# Patient Record
Sex: FEMALE | Race: WHITE | Employment: OTHER | URBAN - METROPOLITAN AREA
[De-identification: names, ages, dates, MRNs, and addresses within clinical notes are randomized per-mention and may not be internally consistent; named-entity substitution may affect disease eponyms.]

---

## 2019-10-03 PROCEDURE — 87624 HPV HI-RISK TYP POOLED RSLT: CPT | Performed by: FAMILY MEDICINE

## 2019-10-03 PROCEDURE — 88175 CYTOPATH C/V AUTO FLUID REDO: CPT | Performed by: FAMILY MEDICINE

## 2019-12-16 PROCEDURE — 88305 TISSUE EXAM BY PATHOLOGIST: CPT | Performed by: INTERNAL MEDICINE

## 2020-01-10 ENCOUNTER — OFFICE VISIT (OUTPATIENT)
Dept: WOUND CARE | Facility: HOSPITAL | Age: 55
End: 2020-01-10
Attending: CLINICAL NURSE SPECIALIST
Payer: COMMERCIAL

## 2020-01-10 DIAGNOSIS — Z71.89 OSTOMY NURSE CONSULTATION: Primary | ICD-10-CM

## 2020-01-10 DIAGNOSIS — C20 RECTAL CANCER (HCC): ICD-10-CM

## 2020-01-10 PROCEDURE — 99213 OFFICE O/P EST LOW 20 MIN: CPT

## 2020-01-10 RX ORDER — FAMOTIDINE 20 MG/1
20 TABLET ORAL ONCE
Status: CANCELLED | OUTPATIENT
Start: 2020-01-10 | End: 2020-01-10

## 2020-01-10 RX ORDER — SODIUM CHLORIDE, SODIUM LACTATE, POTASSIUM CHLORIDE, CALCIUM CHLORIDE 600; 310; 30; 20 MG/100ML; MG/100ML; MG/100ML; MG/100ML
INJECTION, SOLUTION INTRAVENOUS CONTINUOUS
Status: CANCELLED | OUTPATIENT
Start: 2020-01-10

## 2020-01-10 RX ORDER — ACETAMINOPHEN 500 MG
1000 TABLET ORAL ONCE
Status: CANCELLED | OUTPATIENT
Start: 2020-01-10 | End: 2020-01-10

## 2020-01-10 RX ORDER — METOCLOPRAMIDE 10 MG/1
10 TABLET ORAL ONCE
Status: CANCELLED | OUTPATIENT
Start: 2020-01-10 | End: 2020-01-10

## 2020-01-10 NOTE — PROGRESS NOTES
Subjective    Chief Complaint  This information was obtained from the patient  The patient is new to the [address] [facility],[address] here for an initial visit for stoma marking.     Allergies  NKA    HPI  This information was obtained from the patient  1/10/2020: Patient Incontinence  Musculoskeletal: Assistive Devices, Backache, Decreased Activity, Joint Pain, Muscle Pain, Muscle Wasting  Integumentary (Hair/Skin/Nails): Open Sore  Neurological: Abnormal Gait, Dizziness, Headaches  Psychiatric: Anxiety (denies), Depressio abdominal creases and abdominal fold areas using a surgical marker and secured with waterproof Tegaderm dressing. Patient was marked in Right Mid Quadrant. Extra tegaderm dressings were given to patient to patient to reapply if dressing becomes loose.  Laurie

## 2020-01-11 ENCOUNTER — LAB ENCOUNTER (OUTPATIENT)
Dept: LAB | Facility: HOSPITAL | Age: 55
End: 2020-01-11
Attending: COLON & RECTAL SURGERY
Payer: COMMERCIAL

## 2020-01-11 DIAGNOSIS — Z01.818 PRE-OP TESTING: ICD-10-CM

## 2020-01-11 DIAGNOSIS — Z01.818 PREOPERATIVE TESTING: ICD-10-CM

## 2020-01-11 DIAGNOSIS — C20 RECTAL CANCER (HCC): ICD-10-CM

## 2020-01-11 LAB
ANTIBODY SCREEN: NEGATIVE
EST. AVERAGE GLUCOSE BLD GHB EST-MCNC: 88 MG/DL (ref 68–126)
HBA1C MFR BLD HPLC: 4.7 % (ref ?–5.7)
RH BLOOD TYPE: NEGATIVE

## 2020-01-11 PROCEDURE — 86901 BLOOD TYPING SEROLOGIC RH(D): CPT

## 2020-01-11 PROCEDURE — 86900 BLOOD TYPING SEROLOGIC ABO: CPT

## 2020-01-11 PROCEDURE — 36415 COLL VENOUS BLD VENIPUNCTURE: CPT

## 2020-01-11 PROCEDURE — 86850 RBC ANTIBODY SCREEN: CPT

## 2020-01-11 PROCEDURE — 83036 HEMOGLOBIN GLYCOSYLATED A1C: CPT

## 2020-01-13 ENCOUNTER — HOSPITAL ENCOUNTER (INPATIENT)
Facility: HOSPITAL | Age: 55
LOS: 2 days | Discharge: HOME | DRG: 333 | End: 2020-01-15
Attending: COLON & RECTAL SURGERY | Admitting: COLON & RECTAL SURGERY
Payer: COMMERCIAL

## 2020-01-13 ENCOUNTER — ANESTHESIA (OUTPATIENT)
Dept: SURGERY | Facility: HOSPITAL | Age: 55
DRG: 333 | End: 2020-01-13
Payer: COMMERCIAL

## 2020-01-13 ENCOUNTER — ANESTHESIA EVENT (OUTPATIENT)
Dept: SURGERY | Facility: HOSPITAL | Age: 55
DRG: 333 | End: 2020-01-13
Payer: COMMERCIAL

## 2020-01-13 DIAGNOSIS — C20 RECTAL CANCER (HCC): ICD-10-CM

## 2020-01-13 DIAGNOSIS — Z01.818 PREOPERATIVE TESTING: Primary | ICD-10-CM

## 2020-01-13 LAB — B-HCG UR QL: NEGATIVE

## 2020-01-13 PROCEDURE — 88309 TISSUE EXAM BY PATHOLOGIST: CPT | Performed by: COLON & RECTAL SURGERY

## 2020-01-13 PROCEDURE — 0DJD8ZZ INSPECTION OF LOWER INTESTINAL TRACT, VIA NATURAL OR ARTIFICIAL OPENING ENDOSCOPIC: ICD-10-PCS | Performed by: COLON & RECTAL SURGERY

## 2020-01-13 PROCEDURE — 88341 IMHCHEM/IMCYTCHM EA ADD ANTB: CPT | Performed by: COLON & RECTAL SURGERY

## 2020-01-13 PROCEDURE — 4A1BXSH MONITORING OF GASTROINTESTINAL VASCULAR PERFUSION USING INDOCYANINE GREEN DYE, EXTERNAL APPROACH: ICD-10-PCS | Performed by: COLON & RECTAL SURGERY

## 2020-01-13 PROCEDURE — 88305 TISSUE EXAM BY PATHOLOGIST: CPT | Performed by: COLON & RECTAL SURGERY

## 2020-01-13 PROCEDURE — 88342 IMHCHEM/IMCYTCHM 1ST ANTB: CPT | Performed by: COLON & RECTAL SURGERY

## 2020-01-13 PROCEDURE — 0WQF0ZZ REPAIR ABDOMINAL WALL, OPEN APPROACH: ICD-10-PCS | Performed by: COLON & RECTAL SURGERY

## 2020-01-13 PROCEDURE — 0DBP4ZZ EXCISION OF RECTUM, PERCUTANEOUS ENDOSCOPIC APPROACH: ICD-10-PCS | Performed by: COLON & RECTAL SURGERY

## 2020-01-13 PROCEDURE — 81025 URINE PREGNANCY TEST: CPT

## 2020-01-13 RX ORDER — HYDROMORPHONE HYDROCHLORIDE 1 MG/ML
0.6 INJECTION, SOLUTION INTRAMUSCULAR; INTRAVENOUS; SUBCUTANEOUS EVERY 5 MIN PRN
Status: DISCONTINUED | OUTPATIENT
Start: 2020-01-13 | End: 2020-01-13 | Stop reason: HOSPADM

## 2020-01-13 RX ORDER — HYDROCODONE BITARTRATE AND ACETAMINOPHEN 5; 325 MG/1; MG/1
2 TABLET ORAL AS NEEDED
Status: DISCONTINUED | OUTPATIENT
Start: 2020-01-13 | End: 2020-01-13 | Stop reason: HOSPADM

## 2020-01-13 RX ORDER — ROCURONIUM BROMIDE 10 MG/ML
INJECTION, SOLUTION INTRAVENOUS AS NEEDED
Status: DISCONTINUED | OUTPATIENT
Start: 2020-01-13 | End: 2020-01-13 | Stop reason: SURG

## 2020-01-13 RX ORDER — NEOSTIGMINE METHYLSULFATE 0.5 MG/ML
INJECTION INTRAVENOUS AS NEEDED
Status: DISCONTINUED | OUTPATIENT
Start: 2020-01-13 | End: 2020-01-13 | Stop reason: SURG

## 2020-01-13 RX ORDER — HYDROMORPHONE HYDROCHLORIDE 1 MG/ML
0.4 INJECTION, SOLUTION INTRAMUSCULAR; INTRAVENOUS; SUBCUTANEOUS EVERY 2 HOUR PRN
Status: DISCONTINUED | OUTPATIENT
Start: 2020-01-13 | End: 2020-01-15

## 2020-01-13 RX ORDER — MIDAZOLAM HYDROCHLORIDE 1 MG/ML
INJECTION INTRAMUSCULAR; INTRAVENOUS AS NEEDED
Status: DISCONTINUED | OUTPATIENT
Start: 2020-01-13 | End: 2020-01-13 | Stop reason: SURG

## 2020-01-13 RX ORDER — HALOPERIDOL 5 MG/ML
0.25 INJECTION INTRAMUSCULAR ONCE AS NEEDED
Status: DISCONTINUED | OUTPATIENT
Start: 2020-01-13 | End: 2020-01-13 | Stop reason: HOSPADM

## 2020-01-13 RX ORDER — ONDANSETRON 2 MG/ML
INJECTION INTRAMUSCULAR; INTRAVENOUS AS NEEDED
Status: DISCONTINUED | OUTPATIENT
Start: 2020-01-13 | End: 2020-01-13 | Stop reason: SURG

## 2020-01-13 RX ORDER — DOCUSATE SODIUM 100 MG/1
100 CAPSULE, LIQUID FILLED ORAL 2 TIMES DAILY
Status: DISCONTINUED | OUTPATIENT
Start: 2020-01-13 | End: 2020-01-15

## 2020-01-13 RX ORDER — CEFAZOLIN SODIUM/WATER 2 G/20 ML
2 SYRINGE (ML) INTRAVENOUS EVERY 8 HOURS
Status: COMPLETED | OUTPATIENT
Start: 2020-01-13 | End: 2020-01-14

## 2020-01-13 RX ORDER — HYDROCODONE BITARTRATE AND ACETAMINOPHEN 5; 325 MG/1; MG/1
1 TABLET ORAL AS NEEDED
Status: DISCONTINUED | OUTPATIENT
Start: 2020-01-13 | End: 2020-01-13 | Stop reason: HOSPADM

## 2020-01-13 RX ORDER — HEPARIN SODIUM 5000 [USP'U]/ML
5000 INJECTION, SOLUTION INTRAVENOUS; SUBCUTANEOUS ONCE
Status: COMPLETED | OUTPATIENT
Start: 2020-01-13 | End: 2020-01-13

## 2020-01-13 RX ORDER — HYDROMORPHONE HYDROCHLORIDE 1 MG/ML
0.2 INJECTION, SOLUTION INTRAMUSCULAR; INTRAVENOUS; SUBCUTANEOUS EVERY 5 MIN PRN
Status: DISCONTINUED | OUTPATIENT
Start: 2020-01-13 | End: 2020-01-13 | Stop reason: HOSPADM

## 2020-01-13 RX ORDER — KETAMINE HYDROCHLORIDE 50 MG/ML
INJECTION, SOLUTION, CONCENTRATE INTRAMUSCULAR; INTRAVENOUS AS NEEDED
Status: DISCONTINUED | OUTPATIENT
Start: 2020-01-13 | End: 2020-01-13 | Stop reason: SURG

## 2020-01-13 RX ORDER — CELECOXIB 200 MG/1
400 CAPSULE ORAL ONCE
Status: COMPLETED | OUTPATIENT
Start: 2020-01-13 | End: 2020-01-13

## 2020-01-13 RX ORDER — SODIUM CHLORIDE 0.9 % (FLUSH) 0.9 %
10 SYRINGE (ML) INJECTION AS NEEDED
Status: DISCONTINUED | OUTPATIENT
Start: 2020-01-13 | End: 2020-01-15

## 2020-01-13 RX ORDER — SODIUM CHLORIDE, SODIUM LACTATE, POTASSIUM CHLORIDE, CALCIUM CHLORIDE 600; 310; 30; 20 MG/100ML; MG/100ML; MG/100ML; MG/100ML
INJECTION, SOLUTION INTRAVENOUS CONTINUOUS
Status: DISCONTINUED | OUTPATIENT
Start: 2020-01-13 | End: 2020-01-13

## 2020-01-13 RX ORDER — GABAPENTIN 300 MG/1
300 CAPSULE ORAL ONCE
Status: COMPLETED | OUTPATIENT
Start: 2020-01-13 | End: 2020-01-13

## 2020-01-13 RX ORDER — LIDOCAINE HYDROCHLORIDE 10 MG/ML
INJECTION, SOLUTION EPIDURAL; INFILTRATION; INTRACAUDAL; PERINEURAL AS NEEDED
Status: DISCONTINUED | OUTPATIENT
Start: 2020-01-13 | End: 2020-01-13 | Stop reason: SURG

## 2020-01-13 RX ORDER — HEPARIN SODIUM 5000 [USP'U]/ML
5000 INJECTION, SOLUTION INTRAVENOUS; SUBCUTANEOUS EVERY 8 HOURS SCHEDULED
Status: DISCONTINUED | OUTPATIENT
Start: 2020-01-14 | End: 2020-01-15

## 2020-01-13 RX ORDER — MORPHINE SULFATE 4 MG/ML
4 INJECTION, SOLUTION INTRAMUSCULAR; INTRAVENOUS EVERY 10 MIN PRN
Status: DISCONTINUED | OUTPATIENT
Start: 2020-01-13 | End: 2020-01-13 | Stop reason: HOSPADM

## 2020-01-13 RX ORDER — ACETAMINOPHEN 500 MG
1000 TABLET ORAL ONCE
Status: COMPLETED | OUTPATIENT
Start: 2020-01-13 | End: 2020-01-13

## 2020-01-13 RX ORDER — SODIUM CHLORIDE, SODIUM LACTATE, POTASSIUM CHLORIDE, CALCIUM CHLORIDE 600; 310; 30; 20 MG/100ML; MG/100ML; MG/100ML; MG/100ML
INJECTION, SOLUTION INTRAVENOUS CONTINUOUS
Status: DISCONTINUED | OUTPATIENT
Start: 2020-01-13 | End: 2020-01-13 | Stop reason: HOSPADM

## 2020-01-13 RX ORDER — GLYCOPYRROLATE 0.2 MG/ML
INJECTION INTRAMUSCULAR; INTRAVENOUS AS NEEDED
Status: DISCONTINUED | OUTPATIENT
Start: 2020-01-13 | End: 2020-01-13 | Stop reason: SURG

## 2020-01-13 RX ORDER — SCOLOPAMINE TRANSDERMAL SYSTEM 1 MG/1
1 PATCH, EXTENDED RELEASE TRANSDERMAL ONCE
Status: DISCONTINUED | OUTPATIENT
Start: 2020-01-13 | End: 2020-01-13

## 2020-01-13 RX ORDER — HYDROMORPHONE HYDROCHLORIDE 1 MG/ML
0.8 INJECTION, SOLUTION INTRAMUSCULAR; INTRAVENOUS; SUBCUTANEOUS EVERY 2 HOUR PRN
Status: DISCONTINUED | OUTPATIENT
Start: 2020-01-13 | End: 2020-01-14

## 2020-01-13 RX ORDER — SODIUM CHLORIDE, SODIUM LACTATE, POTASSIUM CHLORIDE, CALCIUM CHLORIDE 600; 310; 30; 20 MG/100ML; MG/100ML; MG/100ML; MG/100ML
2 INJECTION, SOLUTION INTRAVENOUS CONTINUOUS
Status: DISCONTINUED | OUTPATIENT
Start: 2020-01-13 | End: 2020-01-14

## 2020-01-13 RX ORDER — METRONIDAZOLE 500 MG/100ML
INJECTION, SOLUTION INTRAVENOUS AS NEEDED
Status: DISCONTINUED | OUTPATIENT
Start: 2020-01-13 | End: 2020-01-13 | Stop reason: SURG

## 2020-01-13 RX ORDER — GABAPENTIN 300 MG/1
300 CAPSULE ORAL NIGHTLY
Status: DISCONTINUED | OUTPATIENT
Start: 2020-01-13 | End: 2020-01-15

## 2020-01-13 RX ORDER — OXYCODONE HYDROCHLORIDE 5 MG/1
10 TABLET ORAL EVERY 4 HOURS PRN
Status: DISCONTINUED | OUTPATIENT
Start: 2020-01-13 | End: 2020-01-15

## 2020-01-13 RX ORDER — HYDROMORPHONE HYDROCHLORIDE 1 MG/ML
0.2 INJECTION, SOLUTION INTRAMUSCULAR; INTRAVENOUS; SUBCUTANEOUS EVERY 2 HOUR PRN
Status: DISCONTINUED | OUTPATIENT
Start: 2020-01-13 | End: 2020-01-15

## 2020-01-13 RX ORDER — MAGNESIUM OXIDE 400 MG (241.3 MG MAGNESIUM) TABLET
400 TABLET DAILY
Status: DISCONTINUED | OUTPATIENT
Start: 2020-01-13 | End: 2020-01-15

## 2020-01-13 RX ORDER — POLYETHYLENE GLYCOL 3350 17 G/17G
17 POWDER, FOR SOLUTION ORAL DAILY PRN
Status: DISCONTINUED | OUTPATIENT
Start: 2020-01-13 | End: 2020-01-15

## 2020-01-13 RX ORDER — ONDANSETRON 2 MG/ML
4 INJECTION INTRAMUSCULAR; INTRAVENOUS EVERY 4 HOURS PRN
Status: DISCONTINUED | OUTPATIENT
Start: 2020-01-13 | End: 2020-01-15

## 2020-01-13 RX ORDER — METRONIDAZOLE 500 MG/100ML
500 INJECTION, SOLUTION INTRAVENOUS EVERY 8 HOURS
Status: COMPLETED | OUTPATIENT
Start: 2020-01-13 | End: 2020-01-14

## 2020-01-13 RX ORDER — CEFAZOLIN SODIUM/WATER 2 G/20 ML
2 SYRINGE (ML) INTRAVENOUS ONCE
Status: COMPLETED | OUTPATIENT
Start: 2020-01-13 | End: 2020-01-13

## 2020-01-13 RX ORDER — OXYCODONE HYDROCHLORIDE 5 MG/1
15 TABLET ORAL EVERY 4 HOURS PRN
Status: DISCONTINUED | OUTPATIENT
Start: 2020-01-13 | End: 2020-01-14

## 2020-01-13 RX ORDER — MORPHINE SULFATE 10 MG/ML
6 INJECTION, SOLUTION INTRAMUSCULAR; INTRAVENOUS EVERY 10 MIN PRN
Status: DISCONTINUED | OUTPATIENT
Start: 2020-01-13 | End: 2020-01-13 | Stop reason: HOSPADM

## 2020-01-13 RX ORDER — SODIUM CHLORIDE 9 MG/ML
INJECTION, SOLUTION INTRAVENOUS CONTINUOUS PRN
Status: DISCONTINUED | OUTPATIENT
Start: 2020-01-13 | End: 2020-01-13 | Stop reason: SURG

## 2020-01-13 RX ORDER — NALOXONE HYDROCHLORIDE 0.4 MG/ML
80 INJECTION, SOLUTION INTRAMUSCULAR; INTRAVENOUS; SUBCUTANEOUS AS NEEDED
Status: DISCONTINUED | OUTPATIENT
Start: 2020-01-13 | End: 2020-01-13 | Stop reason: HOSPADM

## 2020-01-13 RX ORDER — DEXAMETHASONE SODIUM PHOSPHATE 4 MG/ML
VIAL (ML) INJECTION AS NEEDED
Status: DISCONTINUED | OUTPATIENT
Start: 2020-01-13 | End: 2020-01-13 | Stop reason: SURG

## 2020-01-13 RX ORDER — PROCHLORPERAZINE EDISYLATE 5 MG/ML
5 INJECTION INTRAMUSCULAR; INTRAVENOUS ONCE AS NEEDED
Status: DISCONTINUED | OUTPATIENT
Start: 2020-01-13 | End: 2020-01-13 | Stop reason: HOSPADM

## 2020-01-13 RX ORDER — MORPHINE SULFATE 4 MG/ML
2 INJECTION, SOLUTION INTRAMUSCULAR; INTRAVENOUS EVERY 10 MIN PRN
Status: DISCONTINUED | OUTPATIENT
Start: 2020-01-13 | End: 2020-01-13 | Stop reason: HOSPADM

## 2020-01-13 RX ORDER — ACETAMINOPHEN 500 MG
1000 TABLET ORAL EVERY 8 HOURS
Status: DISCONTINUED | OUTPATIENT
Start: 2020-01-13 | End: 2020-01-15

## 2020-01-13 RX ORDER — HYDROMORPHONE HYDROCHLORIDE 1 MG/ML
0.4 INJECTION, SOLUTION INTRAMUSCULAR; INTRAVENOUS; SUBCUTANEOUS EVERY 5 MIN PRN
Status: DISCONTINUED | OUTPATIENT
Start: 2020-01-13 | End: 2020-01-13 | Stop reason: HOSPADM

## 2020-01-13 RX ORDER — ONDANSETRON 2 MG/ML
4 INJECTION INTRAMUSCULAR; INTRAVENOUS ONCE AS NEEDED
Status: DISCONTINUED | OUTPATIENT
Start: 2020-01-13 | End: 2020-01-13 | Stop reason: HOSPADM

## 2020-01-13 RX ORDER — OXYCODONE HYDROCHLORIDE 5 MG/1
5 TABLET ORAL EVERY 4 HOURS PRN
Status: DISCONTINUED | OUTPATIENT
Start: 2020-01-13 | End: 2020-01-15

## 2020-01-13 RX ORDER — CELECOXIB 200 MG/1
200 CAPSULE ORAL EVERY 12 HOURS SCHEDULED
Status: DISCONTINUED | OUTPATIENT
Start: 2020-01-13 | End: 2020-01-15

## 2020-01-13 RX ADMIN — METRONIDAZOLE 500 MG: 500 INJECTION, SOLUTION INTRAVENOUS at 16:12:00

## 2020-01-13 RX ADMIN — SODIUM CHLORIDE: 9 INJECTION, SOLUTION INTRAVENOUS at 12:20:00

## 2020-01-13 RX ADMIN — SODIUM CHLORIDE, SODIUM LACTATE, POTASSIUM CHLORIDE, CALCIUM CHLORIDE: 600; 310; 30; 20 INJECTION, SOLUTION INTRAVENOUS at 16:15:00

## 2020-01-13 RX ADMIN — DEXAMETHASONE SODIUM PHOSPHATE 4 MG: 4 MG/ML VIAL (ML) INJECTION at 12:09:00

## 2020-01-13 RX ADMIN — LIDOCAINE HYDROCHLORIDE 50 MG: 10 INJECTION, SOLUTION EPIDURAL; INFILTRATION; INTRACAUDAL; PERINEURAL at 12:09:00

## 2020-01-13 RX ADMIN — METRONIDAZOLE 500 MG: 500 INJECTION, SOLUTION INTRAVENOUS at 12:30:00

## 2020-01-13 RX ADMIN — CEFAZOLIN SODIUM/WATER 2 G: 2 G/20 ML SYRINGE (ML) INTRAVENOUS at 16:12:00

## 2020-01-13 RX ADMIN — GLYCOPYRROLATE 0.6 MG: 0.2 INJECTION INTRAMUSCULAR; INTRAVENOUS at 16:34:00

## 2020-01-13 RX ADMIN — CEFAZOLIN SODIUM/WATER 2 G: 2 G/20 ML SYRINGE (ML) INTRAVENOUS at 12:16:00

## 2020-01-13 RX ADMIN — ONDANSETRON 4 MG: 2 INJECTION INTRAMUSCULAR; INTRAVENOUS at 15:55:00

## 2020-01-13 RX ADMIN — ROCURONIUM BROMIDE 50 MG: 10 INJECTION, SOLUTION INTRAVENOUS at 12:09:00

## 2020-01-13 RX ADMIN — ROCURONIUM BROMIDE 20 MG: 10 INJECTION, SOLUTION INTRAVENOUS at 14:48:00

## 2020-01-13 RX ADMIN — GLYCOPYRROLATE 0.2 MG: 0.2 INJECTION INTRAMUSCULAR; INTRAVENOUS at 12:09:00

## 2020-01-13 RX ADMIN — SODIUM CHLORIDE, SODIUM LACTATE, POTASSIUM CHLORIDE, CALCIUM CHLORIDE: 600; 310; 30; 20 INJECTION, SOLUTION INTRAVENOUS at 15:38:00

## 2020-01-13 RX ADMIN — NEOSTIGMINE METHYLSULFATE 3 MG: 0.5 INJECTION INTRAVENOUS at 16:35:00

## 2020-01-13 RX ADMIN — KETAMINE HYDROCHLORIDE 35 MG: 50 INJECTION, SOLUTION, CONCENTRATE INTRAMUSCULAR; INTRAVENOUS at 13:02:00

## 2020-01-13 RX ADMIN — MIDAZOLAM HYDROCHLORIDE 2 MG: 1 INJECTION INTRAMUSCULAR; INTRAVENOUS at 12:06:00

## 2020-01-13 NOTE — OPERATIVE REPORT
Operative Note    Patient Name: Irving Gavin    Date of Surgery: 01/13/20    Preoperative Diagnosis: Rectal cancer ([address] [C20]    Postoperative Diagnosis: same, incisional hernia    Primary Surgeon: Sofia Way    Assistant: Lesli Bailey CSA    Procedures: the end of the operation. Additional cannulas were placed. Three 5 mm cannulas were placed with 1 in the left lower quadrant and 2 on the right side of the abdomen. We then placed our last cannula, 12 mm size, in the suprapubic position.     We then placed localize the distal edge of the tumor and define the distal line of resection. The mesorectum was elevated off of the presacral space by bluntly dissecting in the plane between the fascia propria of the rectum and Waldeyer fascia.  Once the rectum was circu was again confirmed, as well as ensuring that there was no small bowel herniating under the left colon. After all inspections were satisfactory, the circular stapler was fired. Both donuts were inspected and found to be circumferentially intact.  The anasto

## 2020-01-13 NOTE — ANESTHESIA PREPROCEDURE EVALUATION
Anesthesia PreOp Note    HPI:     Vikas Gresham is a 47year old female who presents for preoperative consultation requested by: Lorena Paul MD    Date of Surgery: 1/13/2020    Procedure(s):  LAPAROSCOPIC LOW ANTERIOR RESECTION  Indication: Rectal cancer ( mouth., Disp: , Rfl: , Past Week at Unknown time  Methylcobalamin (B12-ACTIVE) 1 MG Oral Chew Tab, Chew by mouth., Disp: , Rfl: , Unknown at Unknown time  MAGNESIUM OR, Take by mouth., Disp: , Rfl: , More than a month at Unknown time  SUMAtriptan Succinate drinks        Types: 4 Standard drinks or equivalent per week      Drug use: Never      Sexual activity: Not on file    Lifestyle      Physical activity:        Days per week: Not on file        Minutes per session: Not on file      Stress: Not on file Evaluation     Patient summary reviewed and Nursing notes reviewed    Airway   Mallampati: I  TM distance: >3 FB  Neck ROM: full  Dental - normal exam     Pulmonary - negative ROS and normal exam   Cardiovascular - negative ROS    Rhythm: regular  Rate: no

## 2020-01-13 NOTE — ANESTHESIA PROCEDURE NOTES
Airway  Date/Time: 1/13/2020 5:25 PM  Urgency: elective    Airway not difficult    General Information and Staff    Patient location during procedure: OR  Anesthesiologist: Karin Lozoya MD  Performed: anesthesiologist     Indications and Patient Conditi

## 2020-01-13 NOTE — H&P
[facility]  Preop H&P - Colon and Rectal Surgery  Emelyn Stoddard MD    CHIEF COMPLAINT:  Rectal cancer     HISTORY OF PRESENT ILLNESS:  Eddie Vargas is a 47year old female who presents for surgery for rectal cancer.     She underwent colonoscopy f Alcohol use: Yes      Alcohol/week: 4.0 standard drinks      Types: 4 Standard drinks or equivalent per week    Drug use: Never           CURRENT MEDICATIONS:    No current outpatient medications on file.            ALLERGIES:  Patient has no known allergi Digital rectal examination was performed. The rigid proctoscope was inserted to 15 cm. Careful inspection of the mucosa was made during withdrawal of the proctoscope. Bowel preparation was excellent. The patient tolerated the procedure well.      Findings: sigmoid colon at 15 cm separate from the colonic mass and was removed with snare electrocautery.    A large 7 cm sessile friable lobulated mass lesion was noted at 15 cm in the rectosigmoid colon, it had a villous appearance and involved about 50% of the jen correlates with site of known colonic malignancy, metastatic milagros  involvement cannot be excluded. Continued attention on follow-up would be recommended. 3.  Multiple sclerotic osseous foci.  Without prior exams to assess for stability, these are at least

## 2020-01-13 NOTE — ANESTHESIA PROCEDURE NOTES
Peripheral IV  Date/Time: 1/13/2020 12:20 PM  Inserted by: Lisa Tilley MD    Placement  Needle size: 20 G  Laterality: right  Location: forearm  Site prep: alcohol  Technique: anatomical landmarks  Attempts: 2

## 2020-01-13 NOTE — ANESTHESIA POSTPROCEDURE EVALUATION
Patient: Melissa Sidhu    Procedure Summary     Date:  01/13/20 Room / Location:  [facility] OR  / [facility] OR    Anesthesia Start:  399 Anesthesia Stop:      Procedure:  LAPAROSCOPIC LOW ANTERIOR RESECTION (N/A Abdomen) Diagnosis:       Rectal cancer ([facility] [address]

## 2020-01-14 LAB
ANION GAP SERPL CALC-SCNC: 6 MMOL/L (ref 0–18)
BASOPHILS # BLD AUTO: 0.02 X10(3) UL (ref 0–0.2)
BASOPHILS NFR BLD AUTO: 0.2 %
BUN BLD-MCNC: 6 MG/DL (ref 7–18)
BUN/CREAT SERPL: 7.3 (ref 10–20)
CALCIUM BLD-MCNC: 7.7 MG/DL (ref 8.5–10.1)
CHLORIDE SERPL-SCNC: 112 MMOL/L (ref 98–112)
CO2 SERPL-SCNC: 27 MMOL/L (ref 21–32)
CREAT BLD-MCNC: 0.82 MG/DL (ref 0.55–1.02)
DEPRECATED RDW RBC AUTO: 37.5 FL (ref 35.1–46.3)
EOSINOPHIL # BLD AUTO: 0 X10(3) UL (ref 0–0.7)
EOSINOPHIL NFR BLD AUTO: 0 %
ERYTHROCYTE [DISTWIDTH] IN BLOOD BY AUTOMATED COUNT: 11.5 % (ref 11–15)
GLUCOSE BLD-MCNC: 96 MG/DL (ref 70–99)
HAV IGM SER QL: 1.5 MG/DL (ref 1.6–2.6)
HCT VFR BLD AUTO: 34.6 % (ref 35–48)
HGB BLD-MCNC: 11.9 G/DL (ref 12–16)
HGB BLD-MCNC: 12.6 G/DL (ref 12–16)
IMM GRANULOCYTES # BLD AUTO: 0.04 X10(3) UL (ref 0–1)
IMM GRANULOCYTES NFR BLD: 0.4 %
LYMPHOCYTES # BLD AUTO: 1.26 X10(3) UL (ref 1–4)
LYMPHOCYTES NFR BLD AUTO: 11.3 %
MCH RBC QN AUTO: 31.1 PG (ref 26–34)
MCHC RBC AUTO-ENTMCNC: 34.4 G/DL (ref 31–37)
MCV RBC AUTO: 90.3 FL (ref 80–100)
MONOCYTES # BLD AUTO: 0.8 X10(3) UL (ref 0.1–1)
MONOCYTES NFR BLD AUTO: 7.2 %
NEUTROPHILS # BLD AUTO: 9.02 X10 (3) UL (ref 1.5–7.7)
NEUTROPHILS # BLD AUTO: 9.02 X10(3) UL (ref 1.5–7.7)
NEUTROPHILS NFR BLD AUTO: 80.9 %
OSMOLALITY SERPL CALC.SUM OF ELEC: 297 MOSM/KG (ref 275–295)
PHOSPHATE SERPL-MCNC: 2.8 MG/DL (ref 2.5–4.9)
PLATELET # BLD AUTO: 240 10(3)UL (ref 150–450)
POTASSIUM SERPL-SCNC: 3.9 MMOL/L (ref 3.5–5.1)
RBC # BLD AUTO: 3.83 X10(6)UL (ref 3.8–5.3)
SODIUM SERPL-SCNC: 145 MMOL/L (ref 136–145)
WBC # BLD AUTO: 11.1 X10(3) UL (ref 4–11)

## 2020-01-14 PROCEDURE — 83735 ASSAY OF MAGNESIUM: CPT | Performed by: COLON & RECTAL SURGERY

## 2020-01-14 PROCEDURE — 80048 BASIC METABOLIC PNL TOTAL CA: CPT | Performed by: COLON & RECTAL SURGERY

## 2020-01-14 PROCEDURE — 97161 PT EVAL LOW COMPLEX 20 MIN: CPT

## 2020-01-14 PROCEDURE — 85025 COMPLETE CBC W/AUTO DIFF WBC: CPT | Performed by: COLON & RECTAL SURGERY

## 2020-01-14 PROCEDURE — 84100 ASSAY OF PHOSPHORUS: CPT | Performed by: COLON & RECTAL SURGERY

## 2020-01-14 PROCEDURE — 85018 HEMOGLOBIN: CPT | Performed by: PHYSICIAN ASSISTANT

## 2020-01-14 RX ORDER — MAGNESIUM OXIDE 400 MG (241.3 MG MAGNESIUM) TABLET
800 TABLET ONCE
Status: COMPLETED | OUTPATIENT
Start: 2020-01-14 | End: 2020-01-14

## 2020-01-14 NOTE — PROGRESS NOTES
[facility] Hospitalist Progress Note     CC: Hospital Follow up    PCP: Drake Bridges MD       Assessment/Plan:     Active Problems:    Rectal cancer [facility])    Patient is a 47year old female with PMH sig for rectal cancer, and poss metastatic breast cancer, w oriented x3  Heent: NC AT,   Pulm: Lungs clear  CV: Heart with regular rate and rhythm  Abd: Abdomen soft, BS noted, minimally tenderness to palpation, no rebound  MSK: Full range of motion in extremities  Skin: no rashes or lesions  Neuro: AO*3, motor int

## 2020-01-14 NOTE — PLAN OF CARE
Patient alert and oriented, but drowsy overnight. No complaints of pain, Scheduled Tylenol administered. Curry intact, to be discontinued in AM. Clears tolerated, advanced to soft this morning with ERAS protocol. Lap sited CDI.  Incentive spirometry reinfor

## 2020-01-14 NOTE — H&P
[facility] Hospitalist H&P       CC: colon CA     PCP: Mu Underwood MD    History of Present Illness: Patient is a 47year old female with PMH sig for rectal cancer, and poss metastatic breast cancer, who presented for resection of rectal mass, now s/p LAR standard drinks      Types: 4 Standard drinks or equivalent per week       Fam Hx  Family History   Problem Relation Age of Onset   • Cancer Maternal Grandmother    • Cancer Maternal Grandfather    • Cancer Paternal Grandmother         stomach   • Heart Dave Cipro s/p LAR with colorectal anastomosis.      Rectal Cancer s/p LAR with colorectal anastomosis  - oral and IV meds for pain control wean to oral meds as able  - Adv diet, zofran for nausea, OBR  - PT/OT/SW  - monitor for acute blood loss anemia, cbc in am  - h

## 2020-01-14 NOTE — PHYSICAL THERAPY NOTE
PHYSICAL THERAPY EVALUATION - INPATIENT    Room Number: 451/451-A  Evaluation Date: 1/14/2020  Presenting Problem: s/p laparascopic low anterior resection 1/13/2020  Physician Order: PT Eval and Treat    Problem List  Active Problems:    Rectal cancer (H (including a wheelchair)?: None   -   Need to walk in hospital room?: None   -   Climbing 3-5 steps with a railing?: None       AM-PAC Score:  Raw Score: 24   Approx Degree of Impairment: 0%   Standardized Score (AM-PAC Scale): 61.14   CMS Modifier (G-Code functional level  Safely and independently

## 2020-01-14 NOTE — PROGRESS NOTES
[facility]    Progress Note    Herman Finley Patient Status:  Inpatient    1965 MRN [id number]   Location [facility] 4W/SW/SE Attending Georgia Rae MD   Hosp Day # 1 PCP Chyrl Boas, MD     Subjective:     POD #1 s/p Pro OPAL  1/14/2020  4:21 PM    D/w Dr. Lissette Simms:     • Heparin Sodium (Porcine)  5,000 Units Subcutaneous Q8H [facility]   • acetaminophen  1,000 mg Oral Q8H   • celecoxib  200 mg Oral Q12H   • gabapentin  300 mg Oral Nightly   • docusate sodium  100 mg Oral BI

## 2020-01-14 NOTE — PLAN OF CARE
Problem: Patient/Family Goals  Goal: Patient/Family Long Term Goal  Description  Patient's Long Term Goal: Return home    Interventions:  - Ambulation  -Return bowel function  -Pain management  - See additional Care Plan goals for specific interventions site(s) healing without S/S of infection  Description  INTERVENTIONS:  - Assess and document risk factors for pressure ulcer development  - Assess and document skin integrity  - Assess and document dressing/incision, wound bed, drain sites and surrounding

## 2020-01-15 VITALS
SYSTOLIC BLOOD PRESSURE: 116 MMHG | WEIGHT: 143.06 LBS | HEIGHT: 66 IN | DIASTOLIC BLOOD PRESSURE: 77 MMHG | OXYGEN SATURATION: 100 % | TEMPERATURE: 99 F | BODY MASS INDEX: 22.99 KG/M2 | RESPIRATION RATE: 18 BRPM | HEART RATE: 75 BPM

## 2020-01-15 LAB
ANION GAP SERPL CALC-SCNC: 6 MMOL/L (ref 0–18)
BASOPHILS # BLD AUTO: 0.02 X10(3) UL (ref 0–0.2)
BASOPHILS NFR BLD AUTO: 0.2 %
BUN BLD-MCNC: 8 MG/DL (ref 7–18)
BUN/CREAT SERPL: 12.9 (ref 10–20)
CALCIUM BLD-MCNC: 8.2 MG/DL (ref 8.5–10.1)
CHLORIDE SERPL-SCNC: 111 MMOL/L (ref 98–112)
CO2 SERPL-SCNC: 26 MMOL/L (ref 21–32)
CREAT BLD-MCNC: 0.62 MG/DL (ref 0.55–1.02)
DEPRECATED RDW RBC AUTO: 39.7 FL (ref 35.1–46.3)
EOSINOPHIL # BLD AUTO: 0.09 X10(3) UL (ref 0–0.7)
EOSINOPHIL NFR BLD AUTO: 0.9 %
ERYTHROCYTE [DISTWIDTH] IN BLOOD BY AUTOMATED COUNT: 11.8 % (ref 11–15)
GLUCOSE BLD-MCNC: 88 MG/DL (ref 70–99)
HAV IGM SER QL: 2 MG/DL (ref 1.6–2.6)
HCT VFR BLD AUTO: 34.4 % (ref 35–48)
HGB BLD-MCNC: 11.6 G/DL (ref 12–16)
IMM GRANULOCYTES # BLD AUTO: 0.03 X10(3) UL (ref 0–1)
IMM GRANULOCYTES NFR BLD: 0.3 %
LYMPHOCYTES # BLD AUTO: 1 X10(3) UL (ref 1–4)
LYMPHOCYTES NFR BLD AUTO: 10.1 %
MCH RBC QN AUTO: 31.3 PG (ref 26–34)
MCHC RBC AUTO-ENTMCNC: 33.7 G/DL (ref 31–37)
MCV RBC AUTO: 92.7 FL (ref 80–100)
MONOCYTES # BLD AUTO: 0.61 X10(3) UL (ref 0.1–1)
MONOCYTES NFR BLD AUTO: 6.2 %
NEUTROPHILS # BLD AUTO: 8.14 X10 (3) UL (ref 1.5–7.7)
NEUTROPHILS # BLD AUTO: 8.14 X10(3) UL (ref 1.5–7.7)
NEUTROPHILS NFR BLD AUTO: 82.3 %
OSMOLALITY SERPL CALC.SUM OF ELEC: 294 MOSM/KG (ref 275–295)
PHOSPHATE SERPL-MCNC: 1.5 MG/DL (ref 2.5–4.9)
PLATELET # BLD AUTO: 209 10(3)UL (ref 150–450)
POTASSIUM SERPL-SCNC: 3.4 MMOL/L (ref 3.5–5.1)
RBC # BLD AUTO: 3.71 X10(6)UL (ref 3.8–5.3)
SODIUM SERPL-SCNC: 143 MMOL/L (ref 136–145)
WBC # BLD AUTO: 9.9 X10(3) UL (ref 4–11)

## 2020-01-15 PROCEDURE — 83735 ASSAY OF MAGNESIUM: CPT | Performed by: PHYSICIAN ASSISTANT

## 2020-01-15 PROCEDURE — 85025 COMPLETE CBC W/AUTO DIFF WBC: CPT | Performed by: PHYSICIAN ASSISTANT

## 2020-01-15 PROCEDURE — 84100 ASSAY OF PHOSPHORUS: CPT | Performed by: PHYSICIAN ASSISTANT

## 2020-01-15 PROCEDURE — 80048 BASIC METABOLIC PNL TOTAL CA: CPT | Performed by: PHYSICIAN ASSISTANT

## 2020-01-15 RX ORDER — POTASSIUM CHLORIDE 20 MEQ/1
40 TABLET, EXTENDED RELEASE ORAL EVERY 4 HOURS
Status: COMPLETED | OUTPATIENT
Start: 2020-01-15 | End: 2020-01-15

## 2020-01-15 RX ORDER — ACETAMINOPHEN 500 MG
1000 TABLET ORAL EVERY 8 HOURS
Qty: 1 TABLET | Refills: 0 | Status: SHIPPED | OUTPATIENT
Start: 2020-01-15 | End: 2020-01-29

## 2020-01-15 NOTE — PLAN OF CARE
No acute events overnight. Denies pain/nausea. Tolerating diet. Voiding without difficulty and passing loose stool. Vitals WNL. Plan for likely discharge home today.       Problem: GASTROINTESTINAL - ADULT  Goal: Minimal or absence of nausea and vomiting  D appropriate  - Initiate Pressure Ulcer prevention bundle as indicated  Outcome: Progressing     Problem: PAIN - ADULT  Goal: Verbalizes/displays adequate comfort level or patient's stated pain goal  Description  INTERVENTIONS:  - Encourage pt to monitor pa

## 2020-01-15 NOTE — DISCHARGE SUMMARY
General Medicine Discharge Summary     Patient ID:  Porter Marks  47year old  2/17/1965    Admit date: 1/13/2020    Discharge date and time: 1/15/20    Attending Physician: Adamaris Castañeda MD     Consults: IP CONSULT TO HOSPITALIST  IP CONSULT TO RESPIRATORY Disposition: home    Activity: activity as tolerated  Diet: regular diet  Wound Care: as directed  Code Status: Full Code  O2: none    Home Medication Changes:     Med list     Medication List      START taking these medications    acetaminophen 500 outlined    Thank Noreen Morgan M.D.  [facility] Hospitalist  Ignacio

## 2020-01-15 NOTE — DIETARY NOTE
Educated pt and family on low fiber diet with progression to higher fiber diet as tolerated. Urged chewing foods well and drinking adequate fluids. Handout and contact information provided.     [address], [address] [facility]   Clinical Dietitian [id number]

## 2020-01-15 NOTE — PROGRESS NOTES
[facility] - [facility]    Progress Note    Aron Owen Patient Status:  Inpatient    1965 MRN [id number]   Location [facility] 4W/SW/SE Attending Cindy Baxter MD   Hosp Day # 2 PCP Elmo Newby MD     Subjective:     POD #2 s/p Pro discharge  Instructions reviewed  RTC Fri 1/22 to see PA for follow up.       DVT Prophy: heparin subq, SCDs     Paris Goldberg, MD      Meds:     • potassium & sodium phosphates  1 packet Oral TID CC   • Heparin Sodium (Porcine)  5,000 Units Subcutaneous Q8H S

## 2020-01-15 NOTE — PLAN OF CARE
Pt alert and oriented x4. VSS. On room air. Tolerating diet, no nausea. Having multiple Bms throughout day, passing gas. Voiding in toilet. Surgical sites intact and well approximated. Pain controlled with scheduled tylenol and celebrex.  Prescriptions hand Discharge  Goal: Maintains or returns to baseline bowel function  Description  INTERVENTIONS:  - Assess bowel function  - Maintain adequate hydration with IV or PO as ordered and tolerated  - Evaluate effectiveness of GI medications  - Encourage mobilizati relaxation techniques  - Monitor for opioid side effects  - Notify MD/LIP if interventions unsuccessful or patient reports new pain  - Anticipate increased pain with activity and pre-medicate as appropriate  Outcome: Adequate for Discharge

## 2020-02-26 PROCEDURE — 88307 TISSUE EXAM BY PATHOLOGIST: CPT | Performed by: INTERNAL MEDICINE

## 2020-02-26 PROCEDURE — 88341 IMHCHEM/IMCYTCHM EA ADD ANTB: CPT | Performed by: INTERNAL MEDICINE

## 2020-02-26 PROCEDURE — 88342 IMHCHEM/IMCYTCHM 1ST ANTB: CPT | Performed by: INTERNAL MEDICINE

## 2020-03-03 ENCOUNTER — APPOINTMENT (OUTPATIENT)
Dept: CT IMAGING | Facility: HOSPITAL | Age: 55
End: 2020-03-03
Attending: EMERGENCY MEDICINE
Payer: COMMERCIAL

## 2020-03-03 ENCOUNTER — HOSPITAL ENCOUNTER (EMERGENCY)
Facility: HOSPITAL | Age: 55
Discharge: HOME | End: 2020-03-04
Attending: EMERGENCY MEDICINE
Payer: COMMERCIAL

## 2020-03-03 ENCOUNTER — APPOINTMENT (OUTPATIENT)
Dept: MRI IMAGING | Facility: HOSPITAL | Age: 55
End: 2020-03-03
Attending: EMERGENCY MEDICINE
Payer: COMMERCIAL

## 2020-03-03 DIAGNOSIS — R47.9 SPEECH DISTURBANCE, UNSPECIFIED TYPE: Primary | ICD-10-CM

## 2020-03-03 LAB
ALBUMIN SERPL-MCNC: 3.4 G/DL (ref 3.4–5)
ALBUMIN/GLOB SERPL: 1 {RATIO} (ref 1–2)
ALP LIVER SERPL-CCNC: 75 U/L (ref 41–108)
ALT SERPL-CCNC: 17 U/L (ref 13–56)
ANION GAP SERPL CALC-SCNC: 5 MMOL/L (ref 0–18)
APTT PPP: 31.1 SECONDS (ref 25.4–36.1)
AST SERPL-CCNC: 14 U/L (ref 15–37)
BASOPHILS # BLD AUTO: 0 X10(3) UL (ref 0–0.2)
BASOPHILS NFR BLD AUTO: 0 %
BILIRUB SERPL-MCNC: 0.7 MG/DL (ref 0.1–2)
BUN BLD-MCNC: 14 MG/DL (ref 7–18)
BUN/CREAT SERPL: 18.2 (ref 10–20)
CALCIUM BLD-MCNC: 9.4 MG/DL (ref 8.5–10.1)
CHLORIDE SERPL-SCNC: 110 MMOL/L (ref 98–112)
CO2 SERPL-SCNC: 25 MMOL/L (ref 21–32)
CREAT BLD-MCNC: 0.77 MG/DL (ref 0.55–1.02)
DEPRECATED RDW RBC AUTO: 37.5 FL (ref 35.1–46.3)
EOSINOPHIL # BLD AUTO: 0 X10(3) UL (ref 0–0.7)
EOSINOPHIL NFR BLD AUTO: 0 %
ERYTHROCYTE [DISTWIDTH] IN BLOOD BY AUTOMATED COUNT: 11.7 % (ref 11–15)
GLOBULIN PLAS-MCNC: 3.5 G/DL (ref 2.8–4.4)
GLUCOSE BLD-MCNC: 144 MG/DL (ref 70–99)
HCT VFR BLD AUTO: 42.3 % (ref 35–48)
HGB BLD-MCNC: 14.5 G/DL (ref 12–16)
IMM GRANULOCYTES # BLD AUTO: 0.02 X10(3) UL (ref 0–1)
IMM GRANULOCYTES NFR BLD: 0.4 %
INR BLD: 0.93 (ref 0.9–1.1)
LYMPHOCYTES # BLD AUTO: 0.39 X10(3) UL (ref 1–4)
LYMPHOCYTES NFR BLD AUTO: 7.7 %
M PROTEIN MFR SERPL ELPH: 6.9 G/DL (ref 6.4–8.2)
MCH RBC QN AUTO: 30.5 PG (ref 26–34)
MCHC RBC AUTO-ENTMCNC: 34.3 G/DL (ref 31–37)
MCV RBC AUTO: 88.9 FL (ref 80–100)
MONOCYTES # BLD AUTO: 0.05 X10(3) UL (ref 0.1–1)
MONOCYTES NFR BLD AUTO: 1 %
NEUTROPHILS # BLD AUTO: 4.61 X10 (3) UL (ref 1.5–7.7)
NEUTROPHILS # BLD AUTO: 4.61 X10(3) UL (ref 1.5–7.7)
NEUTROPHILS NFR BLD AUTO: 90.9 %
OSMOLALITY SERPL CALC.SUM OF ELEC: 293 MOSM/KG (ref 275–295)
PLATELET # BLD AUTO: 284 10(3)UL (ref 150–450)
POTASSIUM SERPL-SCNC: 4 MMOL/L (ref 3.5–5.1)
PSA SERPL DL<=0.01 NG/ML-MCNC: 12.8 SECONDS (ref 12.5–14.7)
RBC # BLD AUTO: 4.76 X10(6)UL (ref 3.8–5.3)
SODIUM SERPL-SCNC: 140 MMOL/L (ref 136–145)
WBC # BLD AUTO: 5.1 X10(3) UL (ref 4–11)

## 2020-03-03 PROCEDURE — 85730 THROMBOPLASTIN TIME PARTIAL: CPT | Performed by: EMERGENCY MEDICINE

## 2020-03-03 PROCEDURE — 99285 EMERGENCY DEPT VISIT HI MDM: CPT

## 2020-03-03 PROCEDURE — 70551 MRI BRAIN STEM W/O DYE: CPT | Performed by: EMERGENCY MEDICINE

## 2020-03-03 PROCEDURE — 36415 COLL VENOUS BLD VENIPUNCTURE: CPT

## 2020-03-03 PROCEDURE — 93005 ELECTROCARDIOGRAM TRACING: CPT

## 2020-03-03 PROCEDURE — 70544 MR ANGIOGRAPHY HEAD W/O DYE: CPT | Performed by: EMERGENCY MEDICINE

## 2020-03-03 PROCEDURE — 80053 COMPREHEN METABOLIC PANEL: CPT | Performed by: EMERGENCY MEDICINE

## 2020-03-03 PROCEDURE — 85025 COMPLETE CBC W/AUTO DIFF WBC: CPT | Performed by: EMERGENCY MEDICINE

## 2020-03-03 PROCEDURE — 85610 PROTHROMBIN TIME: CPT | Performed by: EMERGENCY MEDICINE

## 2020-03-03 PROCEDURE — 70450 CT HEAD/BRAIN W/O DYE: CPT | Performed by: EMERGENCY MEDICINE

## 2020-03-03 PROCEDURE — 93010 ELECTROCARDIOGRAM REPORT: CPT

## 2020-03-03 RX ORDER — ASPIRIN 81 MG/1
324 TABLET, CHEWABLE ORAL ONCE
Status: DISCONTINUED | OUTPATIENT
Start: 2020-03-03 | End: 2020-03-03

## 2020-03-03 RX ORDER — ASPIRIN 81 MG/1
324 TABLET, CHEWABLE ORAL ONCE
Status: COMPLETED | OUTPATIENT
Start: 2020-03-03 | End: 2020-03-03

## 2020-03-04 VITALS
OXYGEN SATURATION: 98 % | BODY MASS INDEX: 21.64 KG/M2 | WEIGHT: 134.69 LBS | HEIGHT: 66 IN | RESPIRATION RATE: 16 BRPM | HEART RATE: 72 BPM | SYSTOLIC BLOOD PRESSURE: 116 MMHG | TEMPERATURE: 97 F | DIASTOLIC BLOOD PRESSURE: 81 MMHG

## 2020-03-04 LAB
ATRIAL RATE: 64 BPM
P AXIS: 58 DEGREES
P-R INTERVAL: 130 MS
Q-T INTERVAL: 408 MS
QRS DURATION: 86 MS
QTC CALCULATION (BEZET): 420 MS
R AXIS: 76 DEGREES
T AXIS: 67 DEGREES
VENTRICULAR RATE: 64 BPM

## 2020-03-04 NOTE — ED NOTES
Patient back from MRI. Patient AOx3, moving all extremities well, denies numbness, speech clear. Spouse at bedside. Will continue to monitor.

## 2020-03-04 NOTE — ED PROVIDER NOTES
Patient Seen in: [facility] Emergency Department      History   Patient presents with:  Numbness Weakness    Stated Complaint: 1st round of chemo today, \"talking stragne\" ate banana and lost function of ton*    HPI    Patient 63-year-old female wh at [address] [facility]   • EXCISIONAL BIOPSY LEFT     • EXCISIONAL BIOSPY RIGHT     • FRACTURE SURGERY Left    • LAPAROSCOPIC LOW ANTERIOR RESECTION N/A 1/13/2020    Performed by Marco Petty MD at [facility] OR   • NEEDLE BIOPSY LEFT     • NEEDLE BIOPS lower extremities. Finger-to-nose intact related. Heel-to-shin intact related. Sensation intact light touch upper lower extremities. Conversant.          ED Course     Labs Reviewed   COMP METABOLIC PANEL (14) - Abnormal; Notable for the following compo Clinical Impression:  Speech disturbance, unspecified type  (primary encounter diagnosis)    Disposition:  Discharge  3/4/2020 12:02 am    Follow-up:  Garry [address]  [place] [phone]    In 2 days

## 2020-03-04 NOTE — ED INITIAL ASSESSMENT (HPI)
Patient presents to ED after an episode of \"speaking funny\" and difficulty eating around 745 pm. Patient states that her symptoms resolved. Patient states that she had 1st chemo therapy infusing today.

## 2020-03-12 PROBLEM — C78.7 LIVER METASTASIS (HCC): Status: ACTIVE | Noted: 2020-03-12

## 2020-04-20 PROBLEM — D70.1 CHEMOTHERAPY INDUCED NEUTROPENIA (HCC): Status: ACTIVE | Noted: 2020-04-20

## 2020-04-20 PROBLEM — T45.1X5A CHEMOTHERAPY INDUCED NEUTROPENIA (HCC): Status: ACTIVE | Noted: 2020-04-20

## 2020-09-21 PROBLEM — R19.7 DIARRHEA: Status: ACTIVE | Noted: 2020-09-21

## 2020-10-25 ENCOUNTER — APPOINTMENT (OUTPATIENT)
Dept: CT IMAGING | Facility: HOSPITAL | Age: 55
DRG: 393 | End: 2020-10-25
Attending: EMERGENCY MEDICINE
Payer: COMMERCIAL

## 2020-10-25 ENCOUNTER — APPOINTMENT (OUTPATIENT)
Dept: ULTRASOUND IMAGING | Facility: HOSPITAL | Age: 55
DRG: 393 | End: 2020-10-25
Attending: SURGERY
Payer: COMMERCIAL

## 2020-10-25 ENCOUNTER — APPOINTMENT (OUTPATIENT)
Dept: INTERVENTIONAL RADIOLOGY/VASCULAR | Facility: HOSPITAL | Age: 55
DRG: 393 | End: 2020-10-25
Attending: EMERGENCY MEDICINE
Payer: COMMERCIAL

## 2020-10-25 ENCOUNTER — HOSPITAL ENCOUNTER (INPATIENT)
Facility: HOSPITAL | Age: 55
LOS: 11 days | Discharge: HOME | DRG: 393 | End: 2020-11-05
Attending: EMERGENCY MEDICINE | Admitting: HOSPITALIST
Payer: COMMERCIAL

## 2020-10-25 DIAGNOSIS — I82.90 THROMBUS: Primary | ICD-10-CM

## 2020-10-25 DIAGNOSIS — D72.829 LEUKOCYTOSIS, UNSPECIFIED TYPE: ICD-10-CM

## 2020-10-25 DIAGNOSIS — K52.9 COLITIS: ICD-10-CM

## 2020-10-25 DIAGNOSIS — D69.6 THROMBOCYTOPENIA (HCC): ICD-10-CM

## 2020-10-25 PROCEDURE — 85025 COMPLETE CBC W/AUTO DIFF WBC: CPT | Performed by: EMERGENCY MEDICINE

## 2020-10-25 PROCEDURE — 85027 COMPLETE CBC AUTOMATED: CPT | Performed by: EMERGENCY MEDICINE

## 2020-10-25 PROCEDURE — 80053 COMPREHEN METABOLIC PANEL: CPT | Performed by: EMERGENCY MEDICINE

## 2020-10-25 PROCEDURE — 96365 THER/PROPH/DIAG IV INF INIT: CPT

## 2020-10-25 PROCEDURE — 96361 HYDRATE IV INFUSION ADD-ON: CPT

## 2020-10-25 PROCEDURE — 93970 EXTREMITY STUDY: CPT | Performed by: SURGERY

## 2020-10-25 PROCEDURE — 96375 TX/PRO/DX INJ NEW DRUG ADDON: CPT

## 2020-10-25 PROCEDURE — 06H03DZ INSERTION OF INTRALUMINAL DEVICE INTO INFERIOR VENA CAVA, PERCUTANEOUS APPROACH: ICD-10-PCS | Performed by: RADIOLOGY

## 2020-10-25 PROCEDURE — 37191 INS ENDOVAS VENA CAVA FILTR: CPT

## 2020-10-25 PROCEDURE — 85730 THROMBOPLASTIN TIME PARTIAL: CPT | Performed by: INTERNAL MEDICINE

## 2020-10-25 PROCEDURE — 99291 CRITICAL CARE FIRST HOUR: CPT

## 2020-10-25 PROCEDURE — 74177 CT ABD & PELVIS W/CONTRAST: CPT | Performed by: EMERGENCY MEDICINE

## 2020-10-25 PROCEDURE — 96366 THER/PROPH/DIAG IV INF ADDON: CPT

## 2020-10-25 PROCEDURE — 85730 THROMBOPLASTIN TIME PARTIAL: CPT | Performed by: EMERGENCY MEDICINE

## 2020-10-25 PROCEDURE — 85007 BL SMEAR W/DIFF WBC COUNT: CPT | Performed by: EMERGENCY MEDICINE

## 2020-10-25 PROCEDURE — B519ZZZ FLUOROSCOPY OF INFERIOR VENA CAVA: ICD-10-PCS | Performed by: RADIOLOGY

## 2020-10-25 RX ORDER — PROCHLORPERAZINE EDISYLATE 5 MG/ML
5 INJECTION INTRAMUSCULAR; INTRAVENOUS EVERY 6 HOURS PRN
Status: DISCONTINUED | OUTPATIENT
Start: 2020-10-25 | End: 2020-11-05

## 2020-10-25 RX ORDER — MORPHINE SULFATE 2 MG/ML
2 INJECTION, SOLUTION INTRAMUSCULAR; INTRAVENOUS EVERY 2 HOUR PRN
Status: DISCONTINUED | OUTPATIENT
Start: 2020-10-25 | End: 2020-11-05

## 2020-10-25 RX ORDER — MORPHINE SULFATE 2 MG/ML
2 INJECTION, SOLUTION INTRAMUSCULAR; INTRAVENOUS ONCE
Status: COMPLETED | OUTPATIENT
Start: 2020-10-25 | End: 2020-10-25

## 2020-10-25 RX ORDER — HEPARIN SODIUM 5000 [USP'U]/ML
80 INJECTION INTRAVENOUS; SUBCUTANEOUS ONCE
Status: COMPLETED | OUTPATIENT
Start: 2020-10-25 | End: 2020-10-25

## 2020-10-25 RX ORDER — MORPHINE SULFATE 4 MG/ML
4 INJECTION, SOLUTION INTRAMUSCULAR; INTRAVENOUS ONCE
Status: DISCONTINUED | OUTPATIENT
Start: 2020-10-25 | End: 2020-10-25

## 2020-10-25 RX ORDER — ONDANSETRON 2 MG/ML
INJECTION INTRAMUSCULAR; INTRAVENOUS
Status: COMPLETED
Start: 2020-10-25 | End: 2020-10-25

## 2020-10-25 RX ORDER — MORPHINE SULFATE 4 MG/ML
4 INJECTION, SOLUTION INTRAMUSCULAR; INTRAVENOUS EVERY 2 HOUR PRN
Status: DISCONTINUED | OUTPATIENT
Start: 2020-10-25 | End: 2020-10-25

## 2020-10-25 RX ORDER — SODIUM CHLORIDE 9 MG/ML
INJECTION, SOLUTION INTRAVENOUS CONTINUOUS
Status: DISCONTINUED | OUTPATIENT
Start: 2020-10-25 | End: 2020-11-03

## 2020-10-25 RX ORDER — MORPHINE SULFATE 2 MG/ML
1 INJECTION, SOLUTION INTRAMUSCULAR; INTRAVENOUS EVERY 2 HOUR PRN
Status: DISCONTINUED | OUTPATIENT
Start: 2020-10-25 | End: 2020-11-05

## 2020-10-25 RX ORDER — MORPHINE SULFATE 4 MG/ML
4 INJECTION, SOLUTION INTRAMUSCULAR; INTRAVENOUS EVERY 2 HOUR PRN
Status: DISCONTINUED | OUTPATIENT
Start: 2020-10-25 | End: 2020-11-05

## 2020-10-25 RX ORDER — HEPARIN SODIUM AND DEXTROSE 10000; 5 [USP'U]/100ML; G/100ML
INJECTION INTRAVENOUS CONTINUOUS
Status: DISCONTINUED | OUTPATIENT
Start: 2020-10-25 | End: 2020-10-28

## 2020-10-25 RX ORDER — MIDAZOLAM HYDROCHLORIDE 1 MG/ML
INJECTION INTRAMUSCULAR; INTRAVENOUS
Status: COMPLETED
Start: 2020-10-25 | End: 2020-10-25

## 2020-10-25 RX ORDER — LABETALOL HYDROCHLORIDE 5 MG/ML
10 INJECTION, SOLUTION INTRAVENOUS EVERY 6 HOURS PRN
Status: DISCONTINUED | OUTPATIENT
Start: 2020-10-25 | End: 2020-11-05

## 2020-10-25 RX ORDER — HEPARIN SODIUM AND DEXTROSE 10000; 5 [USP'U]/100ML; G/100ML
18 INJECTION INTRAVENOUS ONCE
Status: COMPLETED | OUTPATIENT
Start: 2020-10-25 | End: 2020-10-25

## 2020-10-25 RX ORDER — ONDANSETRON 2 MG/ML
4 INJECTION INTRAMUSCULAR; INTRAVENOUS EVERY 6 HOURS PRN
Status: DISCONTINUED | OUTPATIENT
Start: 2020-10-25 | End: 2020-11-05

## 2020-10-25 RX ORDER — ONDANSETRON 2 MG/ML
4 INJECTION INTRAMUSCULAR; INTRAVENOUS ONCE
Status: COMPLETED | OUTPATIENT
Start: 2020-10-25 | End: 2020-10-25

## 2020-10-25 RX ORDER — HEPARIN SODIUM 5000 [USP'U]/ML
INJECTION, SOLUTION INTRAVENOUS; SUBCUTANEOUS
Status: COMPLETED
Start: 2020-10-25 | End: 2020-10-25

## 2020-10-25 RX ORDER — MORPHINE SULFATE 2 MG/ML
2 INJECTION, SOLUTION INTRAMUSCULAR; INTRAVENOUS EVERY 2 HOUR PRN
Status: DISCONTINUED | OUTPATIENT
Start: 2020-10-25 | End: 2020-10-25

## 2020-10-25 RX ORDER — LIDOCAINE HYDROCHLORIDE 10 MG/ML
INJECTION, SOLUTION INFILTRATION; PERINEURAL
Status: COMPLETED
Start: 2020-10-25 | End: 2020-10-25

## 2020-10-25 NOTE — PROCEDURES
[facility]  Pre-Procedure Note    Name: Emilie Mackey  MRN#: [id number]  : 1965    Procedure:  Davy Havers renal IVC placement    Indication: Large infrarenal IVC thrombus.  Metastatic rectal cancer    Allergies:    No Known Allergies    Pertinent Medi

## 2020-10-25 NOTE — ED PROVIDER NOTES
Patient Seen in: [facility] Emergency Department      History   Patient presents with:  Nausea/Vomiting/Diarrhea    Stated Complaint: nvd, chemo last on Tuesday     HPI    80-year-old female history of rectal cancer metastatic to the colon, and live tobacco: Never Used    Alcohol use:  Yes      Alcohol/week: 4.0 standard drinks      Types: 4 Standard drinks or equivalent per week    Drug use: Never             Review of Systems    Positive for stated complaint: nvd, chemo last on Tuesday   Other system place, and time. Mental status is at baseline.       Comments: Lifts all extremities against gravity, face symmetric, speech clear    Psychiatric:         Mood and Affect: Mood normal.             ED Course     Labs Reviewed   COMP METABOLIC PANEL (14) - Ab spectral analysis, and color flow. Doppler imaging were performed. The following veins were imaged bilaterally:  Common, deep, and superficial femoral, popliteal, sapheno-femoral junction, and posterior tibial veins.   FINDINGS:  SAPHENOFEMORAL JUNCTION: transmitted to the [facility] of Radiology) [address]) which includes the Dose Index Registry. PATIENT STATED HISTORY:(As transcribed by Technologist)  Patient with nausea, vomiting and diarrhea x 3 days.  History of degenerative disc disease at T12-L1. The LUNG BASES:  No visible pulmonary or pleural disease. OTHER:  Negative. CONCLUSION:   1. Large partially occlusive thrombus involving the IVC.    2. Diffuse colonic wall thickening throughout the colon Sarita Cogan, agrees with plan for heparin bolus and drip for IVC clot. Found to have thrombocytopenia. Discussed with vascular surgery, still recommend going ahead with heparin drip.   Discussed with GI, regarding colitis, she has WBC of 65, likely related to g 10/25/2020 Yes

## 2020-10-25 NOTE — CONSULTS
[facility], [address] [facility]  Vascular and Endovascular Surgery  [address]     VASCULAR SURGERY   INPATIENT CONSULT NOTE      Name: Antolin Madina   :   1965  [id number]     Date of Consultation: 10/25/2020       ALLYSON GONZALES • FRACTURE SURGERY Left    • LAPAROSCOPIC LOW ANTERIOR RESECTION N/A 1/13/2020    Performed by Jai Corona MD at [address] [place] [address] OR   • NEEDLE BIOPSY LEFT     • NEEDLE BIOPSY RIGHT     • OTHER      Benign fibroadenoma 2 removed from the right and and left breast HGB 12.3 12.5   MCV 93.6 89.1   PLT 83* 80.0*       Recent Labs   Lab 10/19/20  1108 10/25/20  0842    141   K 3.92 3.9    109   CO2 24.7 25.0   BUN 7.0 14   CREATSERUM 0.63 0.86    135*   CA 9.8 9.0     Recent Labs   Lab 10/25/20  1345 PROCEDURE:  CT ABDOMEN+PELVIS (CONTRAST ONLY) (CPT=74177)  COMPARISON:  None.   INDICATIONS:  nvd, chemo last on Tuesday  TECHNIQUE:  CT scanning was performed from the dome of the diaphragm to the pubic symphysis with non-ionic intravenous contrast materia PELVIC NODES:  No adenopathy. PELVIC ORGANS:  Ensing lesion within the fundus of the uterus consistent with a fibroid measuring 34 x 25 mm. BONES:  Sclerotic foci indeterminate etiology are noted T12, L1, L5, and S1 as well as the left ilium.   These are i The patient is a 54year old female who was noted to have a 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 She does not have any lower extremity edema.   I do not believe this thrombus is the source of her pain and this is an inciden

## 2020-10-25 NOTE — H&P
[facility] Hospitalist History and Physical      Patient presents with:  Nausea/Vomiting/Diarrhea       PCP: Roberto Turner MD      History of Present Illness: Patient is a 54year old female with PMH sig for rectal cancer metastatic to liver on chemo and migr • NEEDLE BIOPSY LEFT     • NEEDLE BIOPSY RIGHT     • OTHER      Benign fibroadenoma 2 removed from the right and and left breast        ALL:  No Known Allergies       •  nystatin [id number] UNIT/ML Mouth/Throat Suspension, Take 5 mL (500,000 Units total) by +mild TTP, nondistended, no organomegaly, bowel sounds present  MSK: Full range of motion in extremities, no clubbing, no cyanosis  Skin: no rashes or lesions  Neuro:  Grossly intact, no focal deficits      Data Review:    LABS:   Lab Results   Component V large thrombus measuring almost 8 cm in caudal cranial dimension within the IVC below the renal veins. The aorta is grossly unremarkable. RETROPERITONEUM:  No mass or adenopathy.   BOWEL/MESENTERY:  There is diffuse thickening of the colon throughout the l migraines who presented to the ED c/o nausea, vomiting, and diarrhea for the past three days.      # Acute abd pain with nausea, vomiting, and diarrhea  # Acute colitis   - unclear if colitis is infectious or effect of chemo   - keep NPO with IVFs, pain con

## 2020-10-25 NOTE — ED NOTES
Spoke w/ charge rn , pt to be transferred to 409 - pt ivc placement jugular - pt does not need tele bed, pt rtg after US

## 2020-10-25 NOTE — PROCEDURES
[address] Patient Status:  Emergency    1965 MRN [id number]   Location [address] [facility] Attending No att. providers found   Hosp Day # 0 PCP Heena James MD         Brief Procedure Report    Pre-Operat

## 2020-10-25 NOTE — CONSULTS
GASTROENTEROLOGY CONSULTATION  Vinnie Abdullahi MD    Department of Gastroenterology  [facility]    Charissa Allen Patient Status:  Emergency    1965 MRN [id number]   Location [address] [facility] Attending Justice aMrs DO • EXCISIONAL BIOSPY RIGHT     • FRACTURE SURGERY Left    • LAPAROSCOPIC LOW ANTERIOR RESECTION N/A 1/13/2020    Performed by Emerita Vera MD at [address] [place] [address]   • NEEDLE BIOPSY LEFT     • NEEDLE BIOPSY RIGHT     • OTHER      Benign fibroadenoma 2 removed fr glaucoma. ENT: Denies nose or gums bleeding, mouth sores, bad breath or bad taste in mouth, hearing loss. Physical Exam:    Blood pressure (!) 172/93, pulse 81, resp. rate 16, weight 125 lb (56.7 kg), SpO2 97 %, not currently breastfeeding.     General: A Estephania  10/25/2020  3:55 PM

## 2020-10-26 PROCEDURE — 87507 IADNA-DNA/RNA PROBE TQ 12-25: CPT | Performed by: INTERNAL MEDICINE

## 2020-10-26 PROCEDURE — 87493 C DIFF AMPLIFIED PROBE: CPT | Performed by: INTERNAL MEDICINE

## 2020-10-26 PROCEDURE — 85025 COMPLETE CBC W/AUTO DIFF WBC: CPT | Performed by: INTERNAL MEDICINE

## 2020-10-26 PROCEDURE — C9113 INJ PANTOPRAZOLE SODIUM, VIA: HCPCS | Performed by: INTERNAL MEDICINE

## 2020-10-26 PROCEDURE — 85027 COMPLETE CBC AUTOMATED: CPT | Performed by: INTERNAL MEDICINE

## 2020-10-26 PROCEDURE — 85027 COMPLETE CBC AUTOMATED: CPT | Performed by: SURGERY

## 2020-10-26 PROCEDURE — 85730 THROMBOPLASTIN TIME PARTIAL: CPT | Performed by: INTERNAL MEDICINE

## 2020-10-26 PROCEDURE — 85007 BL SMEAR W/DIFF WBC COUNT: CPT | Performed by: INTERNAL MEDICINE

## 2020-10-26 PROCEDURE — 80048 BASIC METABOLIC PNL TOTAL CA: CPT | Performed by: INTERNAL MEDICINE

## 2020-10-26 PROCEDURE — 80048 BASIC METABOLIC PNL TOTAL CA: CPT | Performed by: SURGERY

## 2020-10-26 RX ORDER — POTASSIUM CHLORIDE 20 MEQ/1
40 TABLET, EXTENDED RELEASE ORAL EVERY 4 HOURS
Status: COMPLETED | OUTPATIENT
Start: 2020-10-26 | End: 2020-10-27

## 2020-10-26 NOTE — PLAN OF CARE
Tired and weak, advised to remain on bedrest. Right IVC filter insertion site  With some oozing of blood , dressing applied. Heparin drip right AC. Held at 2300 per protocol and resumed at 800 units/hr.      Continues to have large ant of bleeding around IV

## 2020-10-26 NOTE — PROGRESS NOTES
Patient admitted to room 409 per cart, oriented to room and surroundings, call light in reach. Patient just returned from temporary ivc filter placemant to right jugular.  Transparent dressing dry and intact to neck, no drainage  noted, bp sytstolic in 452'

## 2020-10-26 NOTE — CONSULTS
[facility]  Report of Consultation    Margi Turk Patient Status:  Inpatient    1965 MRN [id number]   [facility] 4NW-A Attending Keturah Fields, DO   Hosp Day # 1 PCP Roberto Turner MD     REASON FOR CONSULTATION:     Deep Performed by Sameer Castaneda MD at [facility] MAIN OR   • NEEDLE BIOPSY LEFT     • NEEDLE BIOPSY RIGHT     • OTHER      Benign fibroadenoma 2 removed from the right and and left breast     Family History   Problem Relation Age of Onset   • Cancer Maternal Grandmothe breastfeeding. Performance Status: 2   General: Patient is alert and oriented x 3, not in acute distress.    Vital Signs: /83 (BP Location: Left arm)   Pulse 80   Temp 98.1 °F (36.7 °C) (Oral)   Resp 18   Wt 56.7 kg (125 lb)   SpO2 98%   BMI 20.19 1.50 - 7.70 x10 (3) uL   MANUAL DIFFERENTIAL    Collection Time: 10/25/20  8:42 AM   Result Value Ref Range    Neutrophil Absolute Manual 64.19 (H) 1.50 - 7.70 x10(3) uL    Lymphocyte Absolute Manual 0.66 (L) 1.00 - 4.00 x10(3) uL    Monocyte Absolute Manu Manual 0.44 (H) 0 x10(3) uL    Neutrophils % Manual 97 %    Lymphocyte % Manual 1 %    Monocyte % Manual 1 %    Eosinophil % Manual 0 %    Basophil % Manual 0 %    Metamyelocyte % 1 %    NRBC 3 (H) 0    Total Cells Counted 100     RBC Morphology Normal Nor 4:12 PM       Ct Abdomen+pelvis(contrast Only)(cpt=74177)    Addendum Date: 10/25/2020    ADDENDUM:  There is splenomegaly with the spleen measuring 16.5 cm.   Dictated by (CST): Mayi Mahoney MD on 10/25/2020 at 4:38 PM     Finalized by (CST): Mayi Mahoney cancer.    She has completed C11 and has one more treatment to go  No active treatment while in- patient    Leukocytosis - post neulasta  Thrombocytopenia - post chemo and may be consumptive as well  Anemia is new and likely bleeding may have contributed

## 2020-10-26 NOTE — PAYOR COMM NOTE
--------------  ADMISSION REVIEW     Payor: [employer] PPO  Subscriber #:  [id number]  Authorization Number: [id number]    Admit date: 10/25/20  Admit time: 46       Admitting Physician: Soumya Montes DO  Attending Physician:  DO CHRISTIAN Wylie submucosal lesion at 70 cm in the transverse colon -- lipoma, (snare bx/ink arlene marking), polyp at 30 cm --adenoma, polyp at 15 cm- adenoma, colon mass at 15 cm + for carcinoma (bx/Arlene ink) and 3 rectal polyps-adenoma with high grade dysplasia   • COL Rate and Rhythm: Normal rate and regular rhythm. Pulses: Normal pulses. Heart sounds: Normal heart sounds. Pulmonary:      Effort: Pulmonary effort is normal.      Breath sounds: Normal breath sounds.    Abdominal:      General: Abdomen is fl Procedure                               Abnormality         Status                     ---------                               -----------         ------                     CBC W/ DIFFERENTIAL[[id number]]          Abnormal            Final result CONCLUSION:   1. No evidence of deep venous thrombosis in the bilateral lower extremities.   2. The patient describes a palpable abnormality anteriorly along the distal left thigh which corresponds with a focal echogenic lesion in the subcutaneous fat measu PROCEDURE:  CT ABDOMEN+PELVIS (CONTRAST ONLY) (CPT=74177)  COMPARISON:  None.   INDICATIONS:  nvd, chemo last on Tuesday  TECHNIQUE:  CT scanning was performed from the dome of the diaphragm to the pubic symphysis with non-ionic intravenous contrast materia PELVIC NODES:  No adenopathy. PELVIC ORGANS:  Ensing lesion within the fundus of the uterus consistent with a fibroid measuring 34 x 25 mm. BONES:  Sclerotic foci indeterminate etiology are noted T12, L1, L5, and S1 as well as the left ilium.   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, colitis which may be infectious versus inflammatory versus related to chemo  Discussed with interventional radiologist per request of  to get IVC filter, he reviewed films and called me back and stated he can do Present on Admission  Date Reviewed: 10/19/2020          ICD-10-CM Noted POA    * (Principal) Thrombus I82.90 10/25/2020 Unknown    Overview Signed 10/25/2020  4:11 PM by Iona Aase, DO     of IVC         Colitis K52.9 10/25/2020     Leukocyt Past Surgical History:   Procedure Laterality Date   •      • CHOLECYSTECTOMY     • COLECTOMY  2020    Laparoscopic LAR, colorectal anastomosis   • COLONOSCOPY  2019    submucosal lesion at 70 cm in the transverse colon -- lipoma, (sna Problem Relation Age of Onset   • Cancer Maternal Grandmother    • Cancer Maternal Grandfather    • Cancer Paternal Grandmother         stomach   • Heart Disorder Paternal Grandfather    • Cancer Paternal Grandfather        Review of Systems  Comprehensive Dispo: inpt care. Plan of care d/w pt and  who agrees. Outpatient records or previous hospital records reviewed. [facility] hospitalist to continue to follow patient while in house  A total of 70 minutes taken with patient and coordinating care.   Gr 10/25/2020 2119 Given 4 mg Intravenous Tanika Stern, RN      Pantoprazole Sodium (PROTONIX) 40 mg in Sodium Chloride (PF) 0.9 % 10 mL IV push     Date Action Dose Route User    10/26/2020 0120 Given 40 mg Intravenous Jj INIGUEZ RN      0.9%   reading glasses         PAST SURGICAL HISTORY:         Past Surgical History:   Procedure Laterality Date   •        • CHOLECYSTECTOMY       • COLECTOMY   2020     Laparoscopic LAR, colorectal anastomosis   • COLONOSCOPY   2019     s HEENT: ears and throat are clear  NECK: supple, no lymphadenopathy, thyroid wnl  CAROTID: No bruits  RESPIRATORY: no rales, rhonchi, or wheezes B  CARDIO: RRR without murmur, no murmur, no gallop   ABDOMEN: soft, non-tender with no palpable aneurysm or mas The patient is a 54year old female who was noted to have a 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 She does not have any lower extremity edema.   I do not believe this thrombus is the source of her pain and this is an inciden ED Consult to Gastroenterology [[id number]] ordered by Jyoti Echeverria DO at 10/25/20 1257          Signed             GASTROENTEROLOGY CONSULTATION  Chun Manning MD     Department of Gastroenterology  [address] Patient • COLONOSCOPY, POSSIBLE BIOPSY, POSSIBLE POLYPECTOMY [address] N/A 12/14/2019     Performed by Demetrio Oro MD at [facility]   • EXCISIONAL BIOPSY LEFT       • EXCISIONAL BIOSPY RIGHT       • FRACTURE SURGERY Left     • LAPAROSCOPIC LOW AN Allergy: Denies medication allergy, latex/rubber allergy, anaphylactic or other reaction to anesthesia, food allergy. Eyes: Denies blurred/double vision, eye disease, glasses or contacts, glaucoma.   ENT: Denies nose or gums bleeding, mouth sores, bad jo-ann 1. Check stool for c diff. 2. Would hold on empiric antibiotics. 3. Oral and IV hydration. 4. Anti-emetics. 5. PRN imodium. 6. No plans for endoscopy.        Thank you for allowing to participate in the care of this patient.     Roberta Ahn  10/25/2 Signed   Date of Service:  10/25/2020  5:11 PM               Signed             Jaime 56 Patient Status:  Emergency    1965 MRN [id number]   Location [facility] Attending No att. providers fou

## 2020-10-26 NOTE — PROGRESS NOTES
APTT 69.2, is within therapeutic range. No adjustment made to rate.  Redraw levels in the [facility]

## 2020-10-26 NOTE — PROGRESS NOTES
[facility]  Progress Note    Irving Gavin Patient Status:  Inpatient    1965 MRN [id number]   [facility] 4NW-A Attending Tad Escamilla,    Hosp Day # 1 PCP Sunni Gamez MD     Subjective:  Irving Gavin is a(n) 54 year w/ palpation. Bowel sounds quiet but present  Extremities: extremities normal, atraumatic, no cyanosis or edema  Neurologic: Grossly normal          Labs:     Recent Labs   Lab 10/25/20  0842 10/26/20  0526 10/26/20  1142   RBC 4.05 3.46* 3.18*   HGB 12. 5

## 2020-10-26 NOTE — PLAN OF CARE
0700- [facility] RN and day RN at bedside for report. Dressing on R neck IVC filter site oozing large amounts of blood. Radiology contacted who will be by to see the patient and assess the dressing.  Instructed by Dr. Demetrius Rizvi radiology to keep patient sitting at 45 deg

## 2020-10-26 NOTE — PROGRESS NOTES
Had bleeding from access site   Dressing removed  No hematoma    Can resume heparin at previous rate no bolus  Anticoagulation per hematology  No plan to perform thrombectomy, recommend anticoagulation only  Patient can eat  Vascular surgery will sign off

## 2020-10-26 NOTE — DIETARY NOTE
[facility]  NUTRITION INITIAL ASSESSMENT    Pt does not meet malnutrition criteria.     NUTRITION DIAGNOSIS/PROBLEM:  Inadequate oral intake related to stage IV rectal cancer with liver mets increasing nutrient needs as evidenced by reported decreased Fat/Muscle Mass: ANA per visual exam.  2. Fluid Accumulation: none per RN documentation.     NUTRITION PRESCRIPTION: 56.7 kg  Calories: 4397-4328 calories/day (25-35 calories per kg)  Protein: 68-85 grams protein/day (1.2-1.5 grams protein per kg)  Fluid: ~

## 2020-10-27 PROCEDURE — 05H533Z INSERTION OF INFUSION DEVICE INTO RIGHT SUBCLAVIAN VEIN, PERCUTANEOUS APPROACH: ICD-10-PCS | Performed by: HOSPITALIST

## 2020-10-27 PROCEDURE — 80053 COMPREHEN METABOLIC PANEL: CPT | Performed by: INTERNAL MEDICINE

## 2020-10-27 PROCEDURE — 76937 US GUIDE VASCULAR ACCESS: CPT

## 2020-10-27 PROCEDURE — 85384 FIBRINOGEN ACTIVITY: CPT | Performed by: INTERNAL MEDICINE

## 2020-10-27 PROCEDURE — C9113 INJ PANTOPRAZOLE SODIUM, VIA: HCPCS | Performed by: INTERNAL MEDICINE

## 2020-10-27 PROCEDURE — 84132 ASSAY OF SERUM POTASSIUM: CPT | Performed by: INTERNAL MEDICINE

## 2020-10-27 PROCEDURE — 85025 COMPLETE CBC W/AUTO DIFF WBC: CPT | Performed by: INTERNAL MEDICINE

## 2020-10-27 PROCEDURE — 85730 THROMBOPLASTIN TIME PARTIAL: CPT | Performed by: INTERNAL MEDICINE

## 2020-10-27 PROCEDURE — 85049 AUTOMATED PLATELET COUNT: CPT | Performed by: SURGERY

## 2020-10-27 PROCEDURE — 85610 PROTHROMBIN TIME: CPT | Performed by: INTERNAL MEDICINE

## 2020-10-27 PROCEDURE — 36410 VNPNXR 3YR/> PHY/QHP DX/THER: CPT

## 2020-10-27 PROCEDURE — 85027 COMPLETE CBC AUTOMATED: CPT | Performed by: INTERNAL MEDICINE

## 2020-10-27 NOTE — PROGRESS NOTES
Vascular team accessed due to lost RAC IV. Midline placed w/ NS . 45 running at 100.  Bleeding from chest, dressing placed and will continue to monitor

## 2020-10-27 NOTE — PLAN OF CARE
1500- patient bleeding a large amount from multiple chest sites, IVC filter placement site, arms, etc. Paged hospitalist. Orders to pause heparin drip until RN speaks to hematology. Message sent out to Dr. Coleman Douglas by hospitalist. Waiting for orders.     [address]

## 2020-10-27 NOTE — PROGRESS NOTES
[facility]  Progress Note    Isadora Dobbins Patient Status:  Inpatient    1965 MRN [id number]   [facility] 4NW-A Attending Angeles Orta, DO   Hosp Day # 2 PCP Jasen Nelson MD     Subjective:  Isadora Dobbins is a(n) 54 year quiet but present  Extremities: extremities normal, atraumatic, no cyanosis or edema  Neurologic: Grossly normal          Labs:     Recent Labs   Lab 10/25/20  0842 10/26/20  0526 10/26/20  1142   RBC 4.05 3.46* 3.18*   HGB 12.5 10.5* 9.9*   HCT 36.1 31.6*

## 2020-10-27 NOTE — PROGRESS NOTES
[facility]  Progress Note    Isadora Dobbins Patient Status:  Inpatient    1965 MRN [id number]   [facility] 4NW-A Attending Angeles Orta, DO   Hosp Day # 2 PCP Jasen Nelson MD     Subjective:    Heparin was resumed overnig Imaging:    No results found. Medications reviewed. • pantoprazole (PROTONIX) IV push  40 mg Intravenous Q24H         ASSESSMENT AND PLAN:     Jovana Nathan is a 54year old female with history of metastatic rectal cancer to the liver.  She is

## 2020-10-27 NOTE — PLAN OF CARE
Pt alert and oriented x4. VSS, afebrile. Denies pain. Ambulatory to the bathroom with assist, tolerated well. C/o nausea, prn zofran given with relief. Heparin gtt infusing per MAR. Potassium replaced, redraw this AM. Fall precautions in place.  Call light

## 2020-10-27 NOTE — PAYOR COMM NOTE
--------------  CONTINUED STAY REVIEW    Payor: [facility] PPO  Subscriber #:  [id number]  Authorization Number: Vi Leung    Admit date: 10/25/20  Admit time: 46    Admitting Physician: Akanksha Haynes DO  Attending Physician:  Akanksha Haynes DO abd cramps/pain when has bm improved again, has loose stool still.  Denies rectal bleeding still     Role and risk of cscope in eval for colitis discussed w/ them again     Dr Jaison Cortes and I discussed her care today as well     She is asking for magic mouthwa MCH 30.9 30.3 31.1   MCHC 34.6 33.2 34.6   RDW 16.5* 16.8* 16.8*   NEPRELIM 59.74* 40.83*  --    WBC 65.5* 44.0* 42.5*   PLT 80.0* 77.0* 68.0*               Lab Results   Component Value Date     CREATSERUM 0.51 10/26/2020     BUN 13 10/26/2020

## 2020-10-27 NOTE — PROGRESS NOTES
[facility] Hospitalist Progress Note                                                                   [address]  2/17/1965    SUBJECTIVE:  Pt seen and examined.   States abd pain is improving, s Edisylate    Assessment/Plan:  Principal Problem:     Thrombus  Active Problems:    Leukocytosis    Thrombocytopenia (HCC)    Colitis    Leukocytosis, unspecified type    54 yr old female with PMH sig for rectal cancer metastatic to liver on chemo and migra

## 2020-10-28 PROCEDURE — 30233R1 TRANSFUSION OF NONAUTOLOGOUS PLATELETS INTO PERIPHERAL VEIN, PERCUTANEOUS APPROACH: ICD-10-PCS | Performed by: HOSPITALIST

## 2020-10-28 PROCEDURE — C9113 INJ PANTOPRAZOLE SODIUM, VIA: HCPCS | Performed by: INTERNAL MEDICINE

## 2020-10-28 PROCEDURE — 85049 AUTOMATED PLATELET COUNT: CPT | Performed by: SURGERY

## 2020-10-28 PROCEDURE — 86901 BLOOD TYPING SEROLOGIC RH(D): CPT | Performed by: INTERNAL MEDICINE

## 2020-10-28 PROCEDURE — 86850 RBC ANTIBODY SCREEN: CPT | Performed by: INTERNAL MEDICINE

## 2020-10-28 PROCEDURE — 86900 BLOOD TYPING SEROLOGIC ABO: CPT | Performed by: INTERNAL MEDICINE

## 2020-10-28 PROCEDURE — 36430 TRANSFUSION BLD/BLD COMPNT: CPT

## 2020-10-28 RX ORDER — ENOXAPARIN SODIUM 100 MG/ML
30 INJECTION SUBCUTANEOUS 2 TIMES DAILY
Status: DISCONTINUED | OUTPATIENT
Start: 2020-10-28 | End: 2020-10-30

## 2020-10-28 NOTE — DIETARY NOTE
[facility]  NUTRITION INITIAL ASSESSMENT    Pt does not meet malnutrition criteria.     NUTRITION DIAGNOSIS/PROBLEM:  Inadequate oral intake related to stage IV rectal cancer with liver mets increasing nutrient needs as evidenced by reported decreased Supplements: Ensure Enlive    FOOD/NUTRITION RELATED HISTORY:  Appetite: fair to poor  Intake: %  Intake Meeting Needs: Marginal, added supplements  Food Allergies: No  Cultural/Ethnic/Lutheran Preferences Addresses: Yes    NUTRITION RELATED PHYSICA

## 2020-10-28 NOTE — PLAN OF CARE
Problem: Patient/Family Goals  Goal: Patient/Family Long Term Goal  Description: Patient's Long Term Goal: GO HOME  Interventions:  - IV FLUIDS  -VS Q 4 HRS  -MONITOR LABS  - See additional Care Plan goals for specific interventions  10/28/2020 1742 edenilson LEONARD Optimize oral hygiene and moisture  - Encourage food from home; allow for food preferences  - Enhance eating environment  Outcome: Progressing     Problem: HEMATOLOGIC - ADULT  Goal: Maintains hematologic stability  Description: INTERVENTIONS  - Assess for Free from fall injury  Description: INTERVENTIONS:  - Assess pt frequently for physical needs  - Identify cognitive and physical deficits and behaviors that affect risk of falls.   - [place] fall precautions as indicated by assessment.  - Educate pt/famil

## 2020-10-28 NOTE — PROGRESS NOTES
[facility]  Progress Note    Law Jodiradha Patient Status:  Inpatient    1965 MRN [id number]   [facility] 4NW-A Attending Santos Hackett, DO   Hosp Day # 3 PCP Milton Alexandra MD     Subjective:    She had significant oozing PANEL (14)    Collection Time: 10/27/20  6:39 PM   Result Value Ref Range    Glucose 93 70 - 99 mg/dL    Sodium 144 136 - 145 mmol/L    Potassium 3.6 3.5 - 5.1 mmol/L    Chloride 115 (H) 98 - 112 mmol/L    CO2 22.0 21.0 - 32.0 mmol/L    Anion Gap 7 0 - 18 PLT 66.0 (L) 150.0 - 450.0 10(3)uL     Lab Results   Component Value Date    PLT 66.0 (L) 10/28/2020    PLT 74.0 (L) 10/27/2020    PLT 64.0 (L) 10/27/2020    PLT 68.0 (L) 10/26/2020    PLT 77.0 (L) 10/26/2020    PLT 80.0 (L) 10/25/2020    PLT 83 (L) 10/

## 2020-10-28 NOTE — PLAN OF CARE
Problem: Patient/Family Goals  Goal: Patient/Family Long Term Goal  Description: Patient's Long Term Goal:     Interventions:  -   - See additional Care Plan goals for specific interventions  Outcome: Progressing  Goal: Patient/Family Short Term Goal  Sourav Salas

## 2020-10-28 NOTE — PROGRESS NOTES
ALERT & ORIENTED X4. WITH TOLERABLE PAIN. NO NAUSEA OR VOMITING. NO DIARRHEA. RIGHT NECK DRESSING C/D/I--CHANGED BY DR. Molly Culp EARLIER. HEMATOMA /BRUISING NOTED RIGHT NECK AREA. SOME SMALL ROUND BLEEDING UNDER SKIN ON CHEST AREA AND LOWER LEGS NOTED--SEEN BY

## 2020-10-28 NOTE — PROGRESS NOTES
[facility]  Progress Note    Melissa Sidhu Patient Status:  Inpatient    1965 MRN [id number]   [facility] 4NW-A Attending Marva Fleming, DO   Hosp Day # 3 PCP Nadir Fuentes MD     Subjective:  Melissa Sidhu is a(n) 54 year atraumatic, no cyanosis or edema  Neurologic: Grossly normal          Labs:     Recent Labs   Lab 10/27/20  0912 10/27/20  1839 10/28/20  0602   RBC 2.86* 2.78*  --    HGB 8.8* 8.5*  --    HCT 26.8* 25.8*  --    MCV 93.7 92.8  --    MCH 30.8 30.6  --    MC

## 2020-10-28 NOTE — CM/SW NOTE
Care Progression Note:  Active Acute Medical Issue: Thrombus. N/V/D    Other Contributing Medical Factors/Dx.: Rectal CA with colon and liver mets. Received last chemo on 10/22. Length of stay: 3  Avoidable Delays:      Discharge Barriers: 10/25:  To IR

## 2020-10-28 NOTE — PROGRESS NOTES
[facility] Hospitalist Progress Note                                                                   [address]  2/17/1965    SUBJECTIVE:  Pt seen and examined.    Yesterday had bleeding from mu pantoprazole (PROTONIX) IV push  40 mg Intravenous Q24H     Continuous Infusions:   • sodium chloride 100 mL/hr at 10/27/20 0560     PRN: maalox/diphenhydramine/sucralfate, ondansetron HCl, iohexol, Labetalol HCl, influenza virus vaccine PF, morphINE sulfa

## 2020-10-29 PROCEDURE — C9113 INJ PANTOPRAZOLE SODIUM, VIA: HCPCS | Performed by: INTERNAL MEDICINE

## 2020-10-29 PROCEDURE — 80048 BASIC METABOLIC PNL TOTAL CA: CPT | Performed by: SURGERY

## 2020-10-29 PROCEDURE — 85027 COMPLETE CBC AUTOMATED: CPT | Performed by: SURGERY

## 2020-10-29 PROCEDURE — 85049 AUTOMATED PLATELET COUNT: CPT | Performed by: SURGERY

## 2020-10-29 RX ORDER — POTASSIUM CHLORIDE 14.9 MG/ML
20 INJECTION INTRAVENOUS ONCE
Status: COMPLETED | OUTPATIENT
Start: 2020-10-29 | End: 2020-10-29

## 2020-10-29 NOTE — PLAN OF CARE
Pt alert and oriented x4. VSS. No complaints of pain. No N/V. Still having diarrhea, but \"more formed than before\" per pt. Right neck/chest incision intact with gauze dressing. No drainage. IVF infusing per MAR. Potassium replaced per protocol.  Pt ambula

## 2020-10-29 NOTE — PROGRESS NOTES
[facility] Hospitalist Progress Note                                                                   [address]  2/17/1965    SUBJECTIVE:  Pt seen and examined.   Abd pain improved, had loose BM mL/hr at 10/28/20 2011     PRN: maalox/diphenhydramine/sucralfate, ondansetron HCl, iohexol, Labetalol HCl, influenza virus vaccine PF, morphINE sulfate **OR** morphINE sulfate **OR** morphINE sulfate, Prochlorperazine Edisylate    Assessment/Plan:  Princi

## 2020-10-29 NOTE — PROGRESS NOTES
[facility]  Progress Note    Emiliejustin Mackey Patient Status:  Inpatient    1965 MRN [id number]   [facility] 4NW-A Attending Ronny Mulligan DO   Hosp Day # 4 PCP Ferdinand Doll MD     Subjective:    No bleeding since yesterday 5.30 x10(6)uL    HGB 7.8 (L) 12.0 - 16.0 g/dL    HCT 24.0 (L) 35.0 - 48.0 %    PLT 97.0 (L) 150.0 - 450.0 10(3)uL    MCV 94.5 80.0 - 100.0 fL    MCH 30.7 26.0 - 34.0 pg    MCHC 32.5 31.0 - 37.0 g/dL    RDW 18.3 (H) 11.0 - 15.0 %    RDW-SD 55.1 (H) 35.1 - 4 ok   Reviewed clinical situation with the patient and the expected course of deep vein thrombosis. She has a safety IVC filter but need anticoagulation to treat the IVC thrombus.   On low dose enoxaparin 30 - clinically she is well but low hemoglobin - mon

## 2020-10-29 NOTE — PLAN OF CARE
Pt is aox4 on room air no tele and pt has denied any sob or chest pain at this time. Pt vitals has been stable and pt has been afebrile during this shift. Pt Does complain of some abdominal cramping still and with medication pt states its tolerated.  Pt Go hemorrhage  - Monitor labs and vital signs for trends  - Administer supportive blood products/factors, fluids and medications as ordered and appropriate  - Administer supportive blood products/factors as ordered and appropriate  Outcome: Progressing  Goal: limitations  - Instruct pt to call for assistance with activity based on assessment  - Modify environment to reduce risk of injury  - Provide assistive devices as appropriate  - Consider OT/PT consult to assist with strengthening/mobility  - Encourage toil

## 2020-10-29 NOTE — PAYOR COMM NOTE
--------------  CONTINUED STAY REVIEW    Payor: [facility] PPO  Subscriber #:  [id number]  Authorization Number: Lizette Soniay    Admit date: 10/25/20  Admit time: 46    Admitting Physician: Margot Urena DO  Attending Physician:  Margot Urena DO Enoxaparin Sodium  30 mg Subcutaneous BID   • pantoprazole (PROTONIX) IV push  40 mg Intravenous Q24H      Continuous Infusions:   • sodium chloride 100 mL/hr at 10/28/20 2011      PRN: maalox/diphenhydramine/sucralfate, ondansetron HCl, iohexol, Labetalol DAY:  Enoxaparin Sodium (LOVENOX) 30 MG/0.3ML injection 30 mg     Date Action Dose Route User    10/29/2020 0902 Given 30 mg Subcutaneous (Right Lower Abdomen) Alvaro Bassett RN    10/28/2020 2005 Given 30 mg Subcutaneous (Left Lower Abdomen) Mitzi Rm

## 2020-10-29 NOTE — PROGRESS NOTES
[facility]  Progress Note    Alecia Wallace Patient Status:  Inpatient    1965 MRN [id number]   [facility] 4NW-A Attending Jeri Kruse, DO   Hosp Day # 4 PCP Heena James MD     Subjective:  Alecia Wallace is a(n) 54 year Wt 125 lb (56.7 kg)   SpO2 98%   BMI 20.19 kg/m²   General appearance: alert, appears stated age and cooperative  Lungs: clear to auscultation bilaterally  Heart: reg rate  Abdomen: soft, non-tender; bowel sounds normal; no masses,  no organomegaly.  No p

## 2020-10-30 ENCOUNTER — APPOINTMENT (OUTPATIENT)
Dept: CT IMAGING | Facility: HOSPITAL | Age: 55
DRG: 393 | End: 2020-10-30
Attending: HOSPITALIST
Payer: COMMERCIAL

## 2020-10-30 PROCEDURE — 84132 ASSAY OF SERUM POTASSIUM: CPT | Performed by: INTERNAL MEDICINE

## 2020-10-30 PROCEDURE — 87086 URINE CULTURE/COLONY COUNT: CPT | Performed by: HOSPITALIST

## 2020-10-30 PROCEDURE — 85007 BL SMEAR W/DIFF WBC COUNT: CPT | Performed by: INTERNAL MEDICINE

## 2020-10-30 PROCEDURE — 87147 CULTURE TYPE IMMUNOLOGIC: CPT | Performed by: HOSPITALIST

## 2020-10-30 PROCEDURE — C9113 INJ PANTOPRAZOLE SODIUM, VIA: HCPCS | Performed by: INTERNAL MEDICINE

## 2020-10-30 PROCEDURE — 85025 COMPLETE CBC W/AUTO DIFF WBC: CPT | Performed by: HOSPITALIST

## 2020-10-30 PROCEDURE — 80048 BASIC METABOLIC PNL TOTAL CA: CPT | Performed by: INTERNAL MEDICINE

## 2020-10-30 PROCEDURE — 83605 ASSAY OF LACTIC ACID: CPT | Performed by: INTERNAL MEDICINE

## 2020-10-30 PROCEDURE — 85027 COMPLETE CBC AUTOMATED: CPT | Performed by: INTERNAL MEDICINE

## 2020-10-30 PROCEDURE — 81001 URINALYSIS AUTO W/SCOPE: CPT | Performed by: HOSPITALIST

## 2020-10-30 PROCEDURE — 85025 COMPLETE CBC W/AUTO DIFF WBC: CPT | Performed by: INTERNAL MEDICINE

## 2020-10-30 PROCEDURE — 85730 THROMBOPLASTIN TIME PARTIAL: CPT | Performed by: INTERNAL MEDICINE

## 2020-10-30 PROCEDURE — 74177 CT ABD & PELVIS W/CONTRAST: CPT | Performed by: HOSPITALIST

## 2020-10-30 RX ORDER — HEPARIN SODIUM AND DEXTROSE 10000; 5 [USP'U]/100ML; G/100ML
INJECTION INTRAVENOUS CONTINUOUS
Status: DISCONTINUED | OUTPATIENT
Start: 2020-10-31 | End: 2020-11-04

## 2020-10-30 RX ORDER — HEPARIN SODIUM AND DEXTROSE 10000; 5 [USP'U]/100ML; G/100ML
18 INJECTION INTRAVENOUS ONCE
Status: COMPLETED | OUTPATIENT
Start: 2020-10-30 | End: 2020-10-31

## 2020-10-30 RX ORDER — HEPARIN SODIUM 5000 [USP'U]/ML
80 INJECTION INTRAVENOUS; SUBCUTANEOUS ONCE
Status: COMPLETED | OUTPATIENT
Start: 2020-10-30 | End: 2020-10-31

## 2020-10-30 RX ORDER — ACETAMINOPHEN 325 MG/1
650 TABLET ORAL EVERY 6 HOURS PRN
Status: DISCONTINUED | OUTPATIENT
Start: 2020-10-30 | End: 2020-11-05

## 2020-10-30 RX ORDER — POTASSIUM CHLORIDE 14.9 MG/ML
20 INJECTION INTRAVENOUS ONCE
Status: COMPLETED | OUTPATIENT
Start: 2020-10-30 | End: 2020-10-30

## 2020-10-30 NOTE — CM/SW NOTE
Pt was started on Eliquis in house, 30 day free card given, along with $10 co-pay card.     ANIBAL Aquino RN, [facility]  P: [phone]

## 2020-10-30 NOTE — PLAN OF CARE
Pt alert and oriented x4. VSS. Afebrile this shift. Pt complaining of increasing abdominal discomfort and distention. Hospitalist and GI aware. Pt bladder scanned for 340cc's after reporting difficulty urinating starting today.  Only 30cc output upon straig

## 2020-10-30 NOTE — PAYOR COMM NOTE
--------------  CONTINUED STAY REVIEW    Payor: [facility] PPO  Subscriber #:  [id number]  Authorization Number: Truett Arnoller    Admit date: 10/25/20  Admit time: 1737    Admitting Physician: Jayna Blankenship DO  Attending Physician:  Ashley Stack MD  [facility] pantoprazole (PROTONIX) IV push  40 mg Intravenous Q24H      Continuous Infusions:   • sodium chloride 100 mL/hr at 10/29/20 1755      PRN: acetaminophen, maalox/diphenhydramine/sucralfate, ondansetron HCl, iohexol, Labetalol HCl, influenza virus vaccine P to report pt feeling increased distention/bloating and abd discomfort. Obstructive series ordered.  RN to page GI for any additional recs      Electronically signed by Gwen Chester MD at 10/30/2020  1:41 PM          MEDICATIONS ADMINISTERED IN LAST 1 DAY:

## 2020-10-30 NOTE — PROGRESS NOTES
[facility]  Progress Note    Rom Caldwell Patient Status:  Inpatient    1965 MRN [id number]   [facility] 4NW-A Attending Seferino Higuera, DO   Hosp Day # 5 PCP Sona Alcaraz MD     Subjective:    No bleeding   She has some Absolute Prelim 12.33 (H) 1.50 - 7.70 x10 (3) uL    Neutrophil Absolute 12.33 (H) 1.50 - 7.70 x10(3) uL    Lymphocyte Absolute 1.02 1.00 - 4.00 x10(3) uL    Monocyte Absolute 1.24 (H) 0.10 - 1.00 x10(3) uL    Eosinophil Absolute 0.06 0.00 - 0.70 x10(3) uL found.    Medications reviewed.     • Enoxaparin Sodium  30 mg Subcutaneous BID   • pantoprazole (PROTONIX) IV push  40 mg Intravenous Q24H         ASSESSMENT AND PLAN:     Jacquelyn Rubio is a 54year old female with history of metastatic rectal cancer to the

## 2020-10-30 NOTE — PLAN OF CARE
Pt is aox4 on room air no tele and pt has denied any sob or chest pain at this time. Pt vitals has been stable and pt has been afebrile during this shift.  Pt Does complain of some abdominal cramping still and with medication pt states its tolerated but les and appropriate  - Administer supportive blood products/factors as ordered and appropriate  Outcome: Progressing  Goal: Free from bleeding injury  Description: (Example usage: patient with low platelets)  INTERVENTIONS:  - Avoid intramuscular injections, e Provide assistive devices as appropriate  - Consider OT/PT consult to assist with strengthening/mobility  - Encourage toileting schedule  Outcome: Progressing

## 2020-10-30 NOTE — PROGRESS NOTES
[facility] Hospitalist Progress Note                                                                   [address]  2/17/1965    SUBJECTIVE:    Abd still feeling distended. Pain improving.  BMs bec maalox/diphenhydramine/sucralfate, ondansetron HCl, iohexol, Labetalol HCl, influenza virus vaccine PF, morphINE sulfate **OR** morphINE sulfate **OR** morphINE sulfate, Prochlorperazine Edisylate        Assessment/Plan:  Principal Problem:     Thrombus  Ac a/p  - Obs series cancelled. ADDENDUM @ 66 91 21  -notified that pt is still in abd pain.  at bedside. Pt/ worried about the return of her abd pain. SBP 160s. Declining pain medication  -I arrived bedside, examined pt.  Abd soft, some increa

## 2020-10-31 PROCEDURE — 85025 COMPLETE CBC W/AUTO DIFF WBC: CPT | Performed by: HOSPITALIST

## 2020-10-31 PROCEDURE — 85730 THROMBOPLASTIN TIME PARTIAL: CPT | Performed by: INTERNAL MEDICINE

## 2020-10-31 PROCEDURE — 80053 COMPREHEN METABOLIC PANEL: CPT | Performed by: SURGERY

## 2020-10-31 PROCEDURE — C9113 INJ PANTOPRAZOLE SODIUM, VIA: HCPCS | Performed by: INTERNAL MEDICINE

## 2020-10-31 NOTE — PROGRESS NOTES
[facility]  Progress Note    Aron Owen Patient Status:  Inpatient    1965 MRN [id number]   [facility] 4NW-A Attending Ricardo Brown MD   [address] Day # 6 PCP Elmo Newby MD     Subjective:  Aron Owen is a(n) 54year old f Objective:    BP (!) 133/91 (BP Location: Left arm)   Pulse 108   Temp 97.7 °F (36.5 °C) (Oral)   Resp 20   Wt 125 lb (56.7 kg)   SpO2 97%   BMI 20.19 kg/m²   General appearance: NAD, pleasant.  Appears comfortable  Lungs: clear to auscultation bilatera bloating. CONTRAST USED:  67cc of Omnipaque 350     FINDINGS:    LUNG BASES:  Dependent atelectasis bilaterally. Mild distal esophageal wall thickening. Paraesophageal collateral vessels are noted. Tiny pericardial effusion.   Incompletely included excluded. 2. Diffuse colonic wall thickening which appears slightly worsened from prior examination suggestive of colitis of infectious versus inflammatory etiology  3. Slight decrease in prominence of infrarenal IVC thrombus now measuring 0.9 x 1.4 x 7. 3

## 2020-10-31 NOTE — PROGRESS NOTES
[facility]  Progress Note    Bonilla Din Patient Status:  Inpatient    1965 MRN [id number]   [facility] 4NW-A Attending Juany Ingram MD   [facility] Day # 5 PCP Henrique Mello MD     Subjective:  Bonilla Din is a(n) 54year old f BMI 20.19 kg/m²   General appearance: quietly lying in bed. Tired appearing, NAD currently  Lungs: clear to auscultation bilaterally  Heart: reg rate  Abdomen: quiet bowel sounds. Mild to mod distension.   No pain w/ palpation   Extremities: extremities nor Small hepatic cyst within the left lobe measuring 5 mm  BILIARY:  Cholecystectomy. No intrahepatic biliary dilatation. .  SPLEEN:  Splenomegaly measuring 15.6 cm in greatest dimension. PANCREAS:  No jesenia-pancreatic inflammatory stranding.   Decreased perfu which may represent metastatic disease versus edema  5. Ascites and free pelvic fluid. 6. Splenomegaly. 7. Nodular contour to the liver suggestive of cirrhosis.   8. Small nodule within the right breast measuring 0.9 cm which is incompletely evaluated o yes

## 2020-10-31 NOTE — PLAN OF CARE
Patient received this evening alert and oriented x 4, lungs clear, diminished on room air, abdomen distended, firm, bowel sounds active, IVF infusing into right arm midline, talked with vascular, anticoagulation per heme/onc, hep gtt ordered and started, e

## 2020-10-31 NOTE — CONSULTS
Melissa Sidhu is a 54year old female  Patient presents with:  Nausea/Vomiting/Diarrhea      HPI: 55 y/o female h/o rectal cancer with metastasis to liver  S/p low anterior colon resection  On chemotherapy last dose about 10 days ago  Admitted with vena cav Never Smoker      Smokeless tobacco: Never Used    Alcohol use:  Yes      Alcohol/week: 4.0 standard drinks      Types: 4 Standard drinks or equivalent per week      Frequency: Monthly or less      Drinks per session: 1 or 2      Binge frequency: Never    D

## 2020-10-31 NOTE — PROGRESS NOTES
Improved     at bedside    abd  Moderate distention  Non tender    Imp  Improving    Plan clear liquid diet

## 2020-10-31 NOTE — PROGRESS NOTES
[facility]  Progress Note    Melisa Tavarez Patient Status:  Inpatient    1965 MRN [id number]   [facility] 4NW-A Attending Gunnar Hsu, DO   Hosp Day # 6 PCP Mychal Thomson MD     Subjective:    Anticoagulation switched ov 142 136 - 145 mmol/L    Potassium 4.0 3.5 - 5.1 mmol/L    Chloride 114 (H) 98 - 112 mmol/L    CO2 20.0 (L) 21.0 - 32.0 mmol/L    Anion Gap 8 0 - 18 mmol/L    BUN 4 (L) 7 - 18 mg/dL    Creatinine 0.32 (L) 0.55 - 1.02 mg/dL    BUN/CREA Ratio 12.5 10.0 - 20. 0 36.1 seconds   COMP METABOLIC PANEL (14)    Collection Time: 10/31/20  6:35 AM   Result Value Ref Range    Glucose 96 70 - 99 mg/dL    Sodium 142 136 - 145 mmol/L    Potassium 3.3 (L) 3.5 - 5.1 mmol/L    Chloride 113 (H) 98 - 112 mmol/L    CO2 21.0 21.0 - 104.0 (L) 10/30/2020    .0 (L) 10/30/2020    PLT 97.0 (L) 10/29/2020    PLT 89.0 (L) 10/29/2020    PLT 66.0 (L) 10/28/2020    PLT 74.0 (L) 10/27/2020    PLT 64.0 (L) 10/27/2020    PLT 68.0 (L) 10/26/2020    PLT 77.0 (L) 10/26/2020     Lab Results chemotherapy. Now with DVT of IVC     Rectal cancer.    She has completed C11 and has one more treatment to go  She most likely has completed treatment of her cancer for now; no active treatment while in- patient     Leukocytosis - post neulasta  Thrombocyt

## 2020-10-31 NOTE — PROGRESS NOTES
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  S/p IVC filter placement, anticoagulation ordered    Anemia; currently no bleeding, Hg improved to 8.5    Rectal cancer, oncology involved in care and no active elmo

## 2020-11-01 PROCEDURE — 85730 THROMBOPLASTIN TIME PARTIAL: CPT | Performed by: INTERNAL MEDICINE

## 2020-11-01 PROCEDURE — 85025 COMPLETE CBC W/AUTO DIFF WBC: CPT | Performed by: HOSPITALIST

## 2020-11-01 PROCEDURE — C9113 INJ PANTOPRAZOLE SODIUM, VIA: HCPCS | Performed by: INTERNAL MEDICINE

## 2020-11-01 PROCEDURE — 84132 ASSAY OF SERUM POTASSIUM: CPT | Performed by: INTERNAL MEDICINE

## 2020-11-01 RX ORDER — POTASSIUM CHLORIDE 29.8 MG/ML
40 INJECTION INTRAVENOUS ONCE
Status: COMPLETED | OUTPATIENT
Start: 2020-11-01 | End: 2020-11-02

## 2020-11-01 NOTE — PLAN OF CARE
Problem: GASTROINTESTINAL - ADULT  Goal: Minimal or absence of nausea and vomiting  Description: INTERVENTIONS:  - Maintain adequate hydration with IV or PO as ordered and tolerated  - Nasogastric tube to low intermittent suction as ordered  - Evaluate e returns to baseline bowel function  Description: INTERVENTIONS:  - Assess bowel function  - Maintain adequate hydration with IV or PO as ordered and tolerated  - Evaluate effectiveness of GI medications  - Encourage mobilization and activity  - Obtain nutr

## 2020-11-01 NOTE — PROGRESS NOTES
Patient ambulated twice with  around nursing station and tolerated well. Patient tolerated clear liquid diet, abdomen distention has decreased somewhat compared to earlier in am, denies any pain, bowel sounds hypoactive.

## 2020-11-01 NOTE — PROGRESS NOTES
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  S/p IVC filter placement, anticoagulation or

## 2020-11-01 NOTE — PLAN OF CARE
Pt is aox4 on room air no tele and pt has deniend any sob or chest pain during this shift pt vitals has been stable an pt has been afebrile during this shift. Pt has been free from bleed and pt is on heparin gtt at 8units, pt is tolerating well.  Pt does st ADULT  Goal: Maintains hematologic stability  Description: INTERVENTIONS  - Assess for signs and symptoms of bleeding or hemorrhage  - Monitor labs and vital signs for trends  - Administer supportive blood products/factors, fluids and medications as ordere forceful nose blowing  Outcome: Progressing     Problem: PAIN - ADULT  Goal: Verbalizes/displays adequate comfort level or patient's stated pain goal  Description: INTERVENTIONS:  - Encourage pt to monitor pain and request assistance  - Assess pain using a

## 2020-11-01 NOTE — PROGRESS NOTES
Overall stable    Labs improving    Tolerating clears liquids    abd remains moderately distended  Non tender    I suspect ascites  Etiology unsure    Plan  Paracentesis for diagnosis and therapuetic

## 2020-11-01 NOTE — PROGRESS NOTES
Heparin drip decreased to 8 units an hour as ordered,patient npo, denies any abdominal pain at this time.

## 2020-11-01 NOTE — PROGRESS NOTES
[facility]  Progress Note    Gege Munson Patient Status:  Inpatient    1965 MRN [id number]   [facility] 4NW-A Attending Michelle Galvan MD   [address] Day # 7 PCP Zeke Burt MD     Subjective:  Gege Munson is a(n) 54year old f SpO2 100%   BMI 20.19 kg/m²   General appearance: NAD, pleasant. Appears comfortable  Lungs: clear to auscultation bilaterally  Heart: reg rate  Abdomen: quiet bowel sounds but present. Mild distension. No pain w/ palpation, soft. No isac/guarding.     Ext dilatation. .  SPLEEN:  Splenomegaly measuring 15.6 cm in greatest dimension. PANCREAS:  No jesenia-pancreatic inflammatory stranding. Decreased perfusion noted within the uncinate process of the pancreas. ADRENALS:  Normal.  No mass or enlargement.     [facility] 7. Nodular contour to the liver suggestive of cirrhosis. 8. Small nodule within the right breast measuring 0.9 cm which is incompletely evaluated on this examination. Correlation with mammography is recommended.   9. Wall thickening of the distal esopha

## 2020-11-01 NOTE — PROGRESS NOTES
Called US and spoken to Leidy Santos to find out sched. for paracentesis in the am;per Danae,won't know until in the am after 8am;just order INR;will endorse  1315--ptt-102.7-no nursing intervention for heparin;ptt in am  Pt. alert and oriented x 4;spouse at bedside;

## 2020-11-02 ENCOUNTER — APPOINTMENT (OUTPATIENT)
Dept: ULTRASOUND IMAGING | Facility: HOSPITAL | Age: 55
DRG: 393 | End: 2020-11-02
Attending: SURGERY
Payer: COMMERCIAL

## 2020-11-02 PROCEDURE — 0W9G3ZZ DRAINAGE OF PERITONEAL CAVITY, PERCUTANEOUS APPROACH: ICD-10-PCS | Performed by: RADIOLOGY

## 2020-11-02 PROCEDURE — 88305 TISSUE EXAM BY PATHOLOGIST: CPT | Performed by: HOSPITALIST

## 2020-11-02 PROCEDURE — 85025 COMPLETE CBC W/AUTO DIFF WBC: CPT | Performed by: HOSPITALIST

## 2020-11-02 PROCEDURE — C9113 INJ PANTOPRAZOLE SODIUM, VIA: HCPCS | Performed by: INTERNAL MEDICINE

## 2020-11-02 PROCEDURE — 87070 CULTURE OTHR SPECIMN AEROBIC: CPT | Performed by: HOSPITALIST

## 2020-11-02 PROCEDURE — 82042 OTHER SOURCE ALBUMIN QUAN EA: CPT | Performed by: INTERNAL MEDICINE

## 2020-11-02 PROCEDURE — 84132 ASSAY OF SERUM POTASSIUM: CPT | Performed by: HOSPITALIST

## 2020-11-02 PROCEDURE — 89050 BODY FLUID CELL COUNT: CPT | Performed by: HOSPITALIST

## 2020-11-02 PROCEDURE — 89051 BODY FLUID CELL COUNT: CPT | Performed by: HOSPITALIST

## 2020-11-02 PROCEDURE — 87205 SMEAR GRAM STAIN: CPT | Performed by: HOSPITALIST

## 2020-11-02 PROCEDURE — 85610 PROTHROMBIN TIME: CPT | Performed by: INTERNAL MEDICINE

## 2020-11-02 PROCEDURE — 88108 CYTOPATH CONCENTRATE TECH: CPT | Performed by: HOSPITALIST

## 2020-11-02 PROCEDURE — 49083 ABD PARACENTESIS W/IMAGING: CPT | Performed by: SURGERY

## 2020-11-02 PROCEDURE — 89050 BODY FLUID CELL COUNT: CPT | Performed by: INTERNAL MEDICINE

## 2020-11-02 PROCEDURE — 85730 THROMBOPLASTIN TIME PARTIAL: CPT | Performed by: HOSPITALIST

## 2020-11-02 RX ORDER — SPIRONOLACTONE 25 MG/1
50 TABLET ORAL DAILY
Status: DISCONTINUED | OUTPATIENT
Start: 2020-11-02 | End: 2020-11-05

## 2020-11-02 NOTE — PAYOR COMM NOTE
--------------  CONTINUED STAY REVIEW    Payor: [facility] PPO  Subscriber #:  [id number]  Authorization Number: Xavier Morales    Admit date: 10/25/20  Admit time: 1737    Admitting Physician: Linda Hernandez DO  Attending Physician:  Manfred Boateng MD  [facility]             Assessment/plan     Colon wall thickening, ascites on 10/25/20 CT, GI consulted  CT 10/30/20 with gastric wall thickening, colon wall thickening, cirrhosis, ascites, possible esophagitis. Seen by GI and General surgery.  Pt is on zosyn IV, PPI o mg, Intravenous, Q24H     •  iohexol (OMNIPAQUE) 350 MG/ML injection 50 mL, 50 mL, Injection, ONCE PRN     •  Labetalol HCl (TRANDATE) injection 10 mg, 10 mg, Intravenous, Q6H PRN     •  influenza vaccine split quad (FLULAVAL) ages 6 months to 64 years inj imaging was performed.  Dose reduction techniques were used.  Dose information is   transmitted to the ACR ([facility] [employer] [facility] [employer]) [address]) which includes the Dose Index Registry.     PATIENT STATED HISTORY:(As transcr  The celiac artery and superior mesenteric arteries are patent.  The inferior mesenteric artery is not   visualized on this examination. BONES:  Degenerative changes of lumbar spine.                   =====  CONCLUSION:    1.  Interval development of diffu resolved     --appreciate surg input  --anticoagulation as per vascular/heme  --agree w/ abx  --cont supportive care  --plan is for sampling ascites tomorrow. Risk of malignancy discussed.   Currently anticoagulated.          4 abnl ct -- mult other abnl on air) — RAYSA   11/2  Subjective:     No new change -   Loose stools ++  Pain ++     Objective:  Blood pressure 132/83, pulse 97, temperature 98.2 °F (36.8 °C), temperature source Oral, resp.  rate 18, weight 56.7 kg (125 lb), SpO2 98 %, not currently breastfee 11/02/20  5:23 AM   Result Value Ref Range     PT 14.7 (H) 12.4 - 14.6 seconds     INR 1.12 (H) 0.89 - 1.11   POTASSIUM     Collection Time: 11/02/20  5:23 AM   Result Value Ref Range     Potassium 4.0 3.5 - 5.1 mmol/L               Lab Results   Component IVC     Rectal cancer.    She has completed C11 and has one more treatment to go  She most likely has completed treatment of her cancer for now; no active treatment while in- patient     Leukocytosis - post neulasta  Thrombocytopenia - post chemo and may be

## 2020-11-02 NOTE — PLAN OF CARE
Alert and oriented x 4. Drowsy. VSS. Afebrile. No c/o pain or SOB. Heparin gtt infusing. Redraw in AM. IV zosyn q 8. IV fluids infusing. Potassium replaced, redraw in AM. Plan for paracentesis today. NPO at midnight.  Heparin gtt needs to be held 6 hours pr

## 2020-11-02 NOTE — DIETARY NOTE
[facility]  NUTRITION INITIAL ASSESSMENT    Pt does not meet malnutrition criteria.     NUTRITION DIAGNOSIS/PROBLEM:  Inadequate oral intake related to stage IV rectal cancer with liver mets increasing nutrient needs as evidenced by reported decreased oz)  10/05/20 : 57.7 kg (127 lb 4.8 oz)  09/21/20 : 59 kg (130 lb)  09/08/20 : 60 kg (132 lb 3.2 oz)  08/24/20 : 61.6 kg (135 lb 12.8 oz)    Patient Weight for the past 72 hrs:   Weight   10/25/20 0804 56.7 kg (125 lb)     NUTRITION:  Diet: General/Regular

## 2020-11-02 NOTE — IMAGING NOTE
Spoke with Dwayne Slice RN re US guided paracentesis. Heparin gtt stopped at 7:45 am; Patient NPO since MN;   ok for clear liquids   PT/INR  Patient is consent able.    Plan for paracentesis today between 1 to 2pm

## 2020-11-02 NOTE — PROGRESS NOTES
[facility] hospitalist Daily note     Pt was seen/examined on 11/2/20     S no chest pain, no SOB, no abd pain, no nausea but some discomfort due to ascites  + urinating  No bleeding  Plan for paracentesis today     Medications in Epic     PE    11/02/20  1200 possible esophagitis. Seen by GI and General surgery.  Pt is on zosyn IV, PPI ordered  Plan for paracentesis, fluid for cell count, albumin culture and cytology ordered     Abnormal CT abd with omental nodularity, splenomegaly,nodularity right breast, fibro

## 2020-11-02 NOTE — PROGRESS NOTES
[facility]  Progress Note    Jaz Aggarwal Patient Status:  Inpatient    1965 MRN [id number]   [facility] 4NW-A Attending Nic Celis, DO   Hosp Day # 8 PCP Garcia Angel MD     Subjective:    No new change -   Loose sto Collection Time: 11/02/20  5:23 AM   Result Value Ref Range    PTT 72.8 (H) 25.4 - 36.1 seconds   PROTHROMBIN TIME (PT)    Collection Time: 11/02/20  5:23 AM   Result Value Ref Range    PT 14.7 (H) 12.4 - 14.6 seconds    INR 1.12 (H) 0.89 - 1.11   WOLF metastatic rectal cancer to the liver. She is on FOLFOX chemotherapy. Now with DVT of IVC     Rectal cancer.    She has completed C11 and has one more treatment to go  She most likely has completed treatment of her cancer for now; no active treatment while

## 2020-11-02 NOTE — PROGRESS NOTES
[facility]  Progress Note    Porter Marks Patient Status:  Inpatient    1965 MRN [id number]   [facility] 4NW-A Attending Heena Aragon MD   [facility] Day # 8 PCP Ramon Barnard MD     Subjective:  Porter Marks is a(n) 54year old f

## 2020-11-02 NOTE — PROGRESS NOTES
[facility]  Progress Note    Herman Finley Patient Status:  Inpatient    1965 MRN [id number]   [facility] 4NW-A Attending Chioma Yang MD   [facility] Day # 8 PCP Chyrl Boas, MD     Subjective:    Patient reports feeling more dis

## 2020-11-02 NOTE — PLAN OF CARE
Pt alert and oriented x4. VSS. Abdominal pain controlled with 1mg morphine PRN. Heparin gtt stopped for paracentesis this afternoon and restarted at 1625 at 7 ml/hr. 2.3 L removed during paracentesis. Pt reports significant relief.  Up with stand-by assist.

## 2020-11-03 PROCEDURE — C9113 INJ PANTOPRAZOLE SODIUM, VIA: HCPCS | Performed by: INTERNAL MEDICINE

## 2020-11-03 PROCEDURE — 85730 THROMBOPLASTIN TIME PARTIAL: CPT | Performed by: HOSPITALIST

## 2020-11-03 PROCEDURE — 84132 ASSAY OF SERUM POTASSIUM: CPT | Performed by: HOSPITALIST

## 2020-11-03 PROCEDURE — 80048 BASIC METABOLIC PNL TOTAL CA: CPT | Performed by: INTERNAL MEDICINE

## 2020-11-03 RX ORDER — POTASSIUM CHLORIDE 29.8 MG/ML
40 INJECTION INTRAVENOUS ONCE
Status: COMPLETED | OUTPATIENT
Start: 2020-11-03 | End: 2020-11-03

## 2020-11-03 RX ORDER — POTASSIUM CHLORIDE 14.9 MG/ML
20 INJECTION INTRAVENOUS ONCE
Status: COMPLETED | OUTPATIENT
Start: 2020-11-03 | End: 2020-11-03

## 2020-11-03 NOTE — CM/SW NOTE
Care Progression Note:  Active Acute Medical Issue: Thrombus     Other Contributing Medical Factors/Dx. Quincy Augustin anemia, rectal cancer,liver metastasis~ on FOLFOX chemotherapy, leukocytosis, thrombocytopenia, hypokalemia, ascites- paracentesis 2.3L removed.     L

## 2020-11-03 NOTE — PROGRESS NOTES
[facility] hospitalist Daily note     Pt was seen/examined on 11/3/20     S no chest pain, no SOB, no abd pain, no nausea  S/p paracentesis, feels better  + urinating  No bleeding       Medications in Epic     PE    11/03/20  0439   BP: 122/78   Pulse: 96   Resp: surgery.  Pt is on zosyn IV(lenght of , PPI ordered  s/p  Paracentesis, culture pending, cytology ordered     Abnormal CT abd with omental nodularity, splenomegaly,nodularity right breast, fibroid uterus; follow up with Oncology and PCP     DVT of IVC; susp

## 2020-11-03 NOTE — PLAN OF CARE
Pt AOx4. VSS. Very pleasant. Ambulating in room and in halls with  who is at bedside. Upgraded to cardiac diet this AM by GI. Tolerated breakfast, feeling a little full in abdomen but overall better than yesterday. Continuing heparin gtt.  Awaiting r

## 2020-11-03 NOTE — PLAN OF CARE
Problem: Patient/Family Goals  Goal: Patient/Family Long Term Goal  Description: Patient's Long Term Goal: Discharge    Interventions:  - VVS   - D/C heparin   - See additional Care Plan goals for specific interventions  Outcome: Progressing  Goal: Patie Maintains hematologic stability  Description: INTERVENTIONS  - Assess for signs and symptoms of bleeding or hemorrhage  - Monitor labs and vital signs for trends  - Administer supportive blood products/factors, fluids and medications as ordered and appropr Paige fall precautions as indicated by assessment.  - Educate pt/family on patient safety including physical limitations  - Instruct pt to call for assistance with activity based on assessment  - Modify environment to reduce risk of injury  - Provide a

## 2020-11-03 NOTE — PAYOR COMM NOTE
--------------  CONTINUED STAY REVIEW    Payor: [facility] PPO  Subscriber #:  [id number]  Authorization Number: Aislinn Gresham    Admit date: 10/25/20  Admit time: 1737    Admitting Physician: Param Quintero DO  Attending Physician:  Bartolome Tao MD  [facility] Intravenous Alvaro Bassett RN      morphINE sulfate (PF) 2 MG/ML injection 1 mg     Date Action Dose Route User    11/2/2020 1313 Given 1 mg Intravenous Alvaro Bassett RN      Pantoprazole Sodium (PROTONIX) 40 mg in Sodium Chloride (PF) 0.9 % 10 mL IV push

## 2020-11-03 NOTE — PROGRESS NOTES
[facility]  Progress Note    Gal Villalba Patient Status:  Inpatient    1965 MRN [id number]   [facility] 4NW-A Attending Christopher Ordonez MD   [facility] Day # 9 PCP Chelsea Weaver MD     Subjective:  Gal Villalba is a(n) 54year old f

## 2020-11-03 NOTE — PROGRESS NOTES
[facility]  Progress Note    Jaz Click Patient Status:  Inpatient    1965 MRN [id number]   [facility] 4NW-A Attending Dolly Neves MD   [facility] Day # 9 PCP Garcia Angel MD     Subjective:    Patient reports feeling improvem

## 2020-11-03 NOTE — PROGRESS NOTES
[facility]  Progress Note    Law Osei Patient Status:  Inpatient    1965 MRN [id number]   [facility] 4NW-A Attending Santos Hackett, DO   Hosp Day # 9 PCP Milton Alexandra MD     Subjective:    S/p paracentesis   Feeling mmol/L    Chloride 109 98 - 112 mmol/L    CO2 23.0 21.0 - 32.0 mmol/L    Anion Gap 9 0 - 18 mmol/L    BUN 4 (L) 7 - 18 mg/dL    Creatinine 0.33 (L) 0.55 - 1.02 mg/dL    BUN/CREA Ratio 12.1 10.0 - 20.0    Calcium, Total 8.3 (L) 8.5 - 10.1 mg/dL    Calculate Probable uterine fibroid within the fundus. . .       Medications reviewed. • potassium chloride  40 mEq Intravenous Once    Followed by   • potassium chloride  20 mEq Intravenous Once   • spironolactone  50 mg Oral Daily   • piperacillin-tazobactam  3.

## 2020-11-04 PROCEDURE — 80048 BASIC METABOLIC PNL TOTAL CA: CPT | Performed by: HOSPITALIST

## 2020-11-04 PROCEDURE — 85027 COMPLETE CBC AUTOMATED: CPT | Performed by: SURGERY

## 2020-11-04 PROCEDURE — 80048 BASIC METABOLIC PNL TOTAL CA: CPT | Performed by: SURGERY

## 2020-11-04 PROCEDURE — C9113 INJ PANTOPRAZOLE SODIUM, VIA: HCPCS | Performed by: INTERNAL MEDICINE

## 2020-11-04 PROCEDURE — 85730 THROMBOPLASTIN TIME PARTIAL: CPT | Performed by: HOSPITALIST

## 2020-11-04 PROCEDURE — 85730 THROMBOPLASTIN TIME PARTIAL: CPT | Performed by: INTERNAL MEDICINE

## 2020-11-04 PROCEDURE — 85027 COMPLETE CBC AUTOMATED: CPT | Performed by: HOSPITALIST

## 2020-11-04 RX ORDER — SPIRONOLACTONE 50 MG/1
50 TABLET, FILM COATED ORAL DAILY
Qty: 30 TABLET | Refills: 2 | Status: SHIPPED | OUTPATIENT
Start: 2020-11-05 | End: 2021-02-16

## 2020-11-04 NOTE — PROGRESS NOTES
[facility]  Progress Note    Ramona Gamboa Patient Status:  Inpatient    1965 MRN [id number]   [facility] 1SE-B Attending Oumar Ross MD   [address] Day # 10 PCP Destini Paul MD     Subjective:  Ramona Gamboa is a(n) 54year old

## 2020-11-04 NOTE — PROGRESS NOTES
[facility]  Progress Note    Melissa Sidhu Patient Status:  Inpatient    1965 MRN [id number]   [facility] 4NW-A Attending Marva Fleming, DO   Hosp Day # 10 PCP Nadir Fuentes MD     Subjective:    S/p paracentesis   Feeling >=60    GFR, -American 132 >=60     Lab Results   Component Value Date    .0 (L) 11/04/2020    PLT 97.0 (L) 11/02/2020    .0 (L) 11/01/2020    .0 (L) 10/31/2020    .0 (L) 10/30/2020     Lab Results   Component Value Date patient     Leukocytosis - post neulasta  Thrombocytopenia - post chemo and may be consumptive as well  -platelets are 470  Hgb 9+     The DVT of the IVC is new and concerning.  The risk being cancer and chemotherapy   - s/p IVC filter placement and was sta

## 2020-11-04 NOTE — PLAN OF CARE
A&Ox4, RA, no tele, NS stopped, VSS. RFA midline running heparin @ 6 units an hour. Recent aPTT- 86.9, no need to titrate. Next lab draw in the am. WBC elevated to 13.5. Noticed bi lateral lateral foot redness. Lateral L leg bruise noticed.  Ascites noticed

## 2020-11-04 NOTE — PROGRESS NOTES
Transferred and received w/o incident. Pt a&ox4, VSS, denied pain. Medications tolerated. LFA IV red, with leaking and pain. IV site removed. Will continue to monitor.

## 2020-11-04 NOTE — PROGRESS NOTES
Zosyn d/c, switching from heparin drip to Eliquis 5mg BID. Eliquis start @ 2100, stop heparin at 1900, as per pharmacy.

## 2020-11-04 NOTE — PAYOR COMM NOTE
--------------  CONTINUED STAY REVIEW    Payor: [employer] PPO  Subscriber #:  [id number]  Authorization Number: Fam Pr[employer]    Admit date: 10/25/20  Admit time: 1737    Admitting Physician: Jeri Kruse DO  Attending Physician:  Farhad Desai MD  [facility]  General Surgery  11/4/2020    MEDICATIONS ADMINISTERED IN LAST 1 DAY:  heparin (PORCINE) drip [id number]/250mL infusion CONTINUOUS     Date Action Dose Route User    11/4/2020 0553 Hi-Risk Rate/Dose Change 600 Units/hr Intravenous Xochitl Villela,

## 2020-11-04 NOTE — PROGRESS NOTES
[facility] hospitalist Daily note     Pt was seen/examined on 11/4/20     S no chest pain, no SOB, no abd pain, no nausea  S/p paracentesis, feels better  + urinating  No bleeding  I spoke with pathology, thus far cytology negative however the result is not final thickening, colon wall thickening, cirrhosis, ascites, possible esophagitis. Seen by GI and General surgery. Zosyn IV , PPI ordered. I spoke with GI, GI recommended to stop Zosyn  s/p  Paracentesis, culture no growth.   , cytology pending (thus far no malig

## 2020-11-04 NOTE — PROGRESS NOTES
[facility]  Progress Note    Herman Finley Patient Status:  Inpatient    1965 MRN [id number]   [facility] 1SE-B Attending Chioma Yang MD   [facility] Day # 10 PCP Chyrl Boas, MD     Subjective:    Patient tolerating PO diet, den

## 2020-11-04 NOTE — PLAN OF CARE
Pt is aox4 on room air no tele and pt has denied any sob or chest pain at this time. Pt has been afebrile during this shift, and pt vitals has been stable. Pt is resting in bed pt does complain of abdominal pain and with medication pt states it helps.  Pt h HEMATOLOGIC - ADULT  Goal: Maintains hematologic stability  Description: INTERVENTIONS  - Assess for signs and symptoms of bleeding or hemorrhage  - Monitor labs and vital signs for trends  - Administer supportive blood products/factors, fluids and medicat

## 2020-11-05 VITALS
DIASTOLIC BLOOD PRESSURE: 86 MMHG | BODY MASS INDEX: 20 KG/M2 | RESPIRATION RATE: 18 BRPM | HEART RATE: 101 BPM | SYSTOLIC BLOOD PRESSURE: 119 MMHG | OXYGEN SATURATION: 98 % | TEMPERATURE: 99 F | WEIGHT: 125 LBS

## 2020-11-05 RX ORDER — FUROSEMIDE 20 MG/1
20 TABLET ORAL DAILY
Qty: 30 TABLET | Refills: 0 | Status: SHIPPED | OUTPATIENT
Start: 2020-11-05 | End: 2020-12-05

## 2020-11-05 RX ORDER — TRAMADOL HYDROCHLORIDE 50 MG/1
50 TABLET ORAL EVERY 12 HOURS PRN
Qty: 10 TABLET | Refills: 0 | Status: SHIPPED | OUTPATIENT
Start: 2020-11-05 | End: 2020-11-10

## 2020-11-05 NOTE — PAYOR COMM NOTE
--------------  CONTINUED STAY REVIEW    Payor: [facility] PPO  Subscriber #:  [id number]  Authorization Number: Thea Bhardwaj    Admit date: 10/25/20  Admit time: 1737    Admitting Physician: Shellie Crigler, MD  Attending Physician:  Shellie Crigler, MD  Primary Care P (H) 25.4 - 36.1 seconds               Lab Results   Component Value Date     .0 (L) 11/04/2020     .0 (L) 11/04/2020     PLT 97.0 (L) 11/02/2020     .0 (L) 11/01/2020     .0 (L) 10/31/2020            Lab Results   Component Valu of the IVC is new and concerning. The risk being cancer and chemotherapy   - s/p IVC filter placement and was started on anticoagulation with heparin but had bleeding at the IV access site in the neck.    On Apixaban      Abdominal pain  CT reviewed: in add

## 2020-11-05 NOTE — DISCHARGE SUMMARY
General Medicine Discharge Summary     Patient ID:  Griselda Nearing  54year old  2/17/1965    Admit date: 10/25/2020    Discharge date and time: 11/5/20    Attending Physician: Leroy Guerrero MD     Consults: IP CONSULT TO HEMATOLOGY  IP CONSULT TO HOSPITALIST these medications    apixaban 5 MG Tabs  Commonly known as: ELIQUIS  Take 1 tablet (5 mg total) by mouth 2 (two) times daily. spironolactone 50 MG Tabs  Commonly known as: ALDACTONE  Take 1 tablet (50 mg total) by mouth daily.         CONTINUE taking th

## 2020-11-05 NOTE — PROGRESS NOTES
[facility]  Progress Note    Froilan Litter Patient Status:  Inpatient    1965 MRN [id number]   [facility] 4NW-A Attending Akanksha Haynes, DO   Hosp Day # 6 PCP Delmar Rizvi MD     Subjective:    She feels better    Object 11/02/2020    .0 (L) 11/01/2020    .0 (L) 10/31/2020     Lab Results   Component Value Date    HGB 9.6 (L) 11/04/2020    HGB 9.1 (L) 11/04/2020    HGB 8.7 (L) 11/02/2020    HGB 8.8 (L) 11/01/2020    HGB 8.5 (L) 10/31/2020    HGB 9.0 (L) 10/30 the IV access site in the neck.    On Apixaban     Abdominal pain  CT reviewed: in addition to colitis, there is thickening of the gastric bowel as well as ascites and omental nodularity with ascites  Paracentesis one - 2 + liters removed - cytology negativ

## 2020-11-06 NOTE — PAYOR COMM NOTE
--------------  DISCHARGE REVIEW    Payor: [facility]  Subscriber #:  [id number]  Authorization Number: Choco Michaud    Admit date: 10/25/20  Admit time:  0293  Discharge Date: 11/5/2020 12:24 PM     Admitting Physician: Dorrine Hammans, MD  Attending Physician: negative, found to have dvt of ivc s/p ivc filter placement, was on heparin drip and to be on eliquis outpt, f/u heme/gi outpt, c/o abx for uti        Operative Procedures:      Imaging: Us Paracentesis W Imaging ([id number])    Result Date: 11/2/2020  CONC 48 MG Tabs         FU  Follow-up Information     Jody Mcginnis MD. Schedule an appointment as soon as possible for a visit in 1 week.     Specialty: Family Medicine  Contact information:  [address] [place] [address]  [place] [address] [address] [facility]  [phone]

## 2021-02-28 ENCOUNTER — LAB ENCOUNTER (OUTPATIENT)
Dept: LAB | Facility: HOSPITAL | Age: 56
End: 2021-02-28
Attending: INTERNAL MEDICINE
Payer: COMMERCIAL

## 2021-02-28 DIAGNOSIS — Z95.828 PRESENCE OF IVC FILTER: ICD-10-CM

## 2021-03-01 LAB — SARS-COV-2 RNA RESP QL NAA+PROBE: NOT DETECTED

## 2021-03-03 ENCOUNTER — HOSPITAL ENCOUNTER (OUTPATIENT)
Dept: INTERVENTIONAL RADIOLOGY/VASCULAR | Facility: HOSPITAL | Age: 56
Discharge: HOME | End: 2021-03-03
Attending: INTERNAL MEDICINE | Admitting: INTERNAL MEDICINE
Payer: COMMERCIAL

## 2021-03-03 VITALS
TEMPERATURE: 97 F | SYSTOLIC BLOOD PRESSURE: 111 MMHG | DIASTOLIC BLOOD PRESSURE: 72 MMHG | OXYGEN SATURATION: 99 % | HEART RATE: 73 BPM | RESPIRATION RATE: 14 BRPM

## 2021-03-03 DIAGNOSIS — Z95.828 PRESENCE OF IVC FILTER: Primary | ICD-10-CM

## 2021-03-03 LAB — INR: 1.1 (ref 0.8–1.3)

## 2021-03-03 PROCEDURE — 37193 REM ENDOVAS VENA CAVA FILTER: CPT

## 2021-03-03 PROCEDURE — 06PY3DZ REMOVAL OF INTRALUMINAL DEVICE FROM LOWER VEIN, PERCUTANEOUS APPROACH: ICD-10-PCS | Performed by: RADIOLOGY

## 2021-03-03 PROCEDURE — 85610 PROTHROMBIN TIME: CPT

## 2021-03-03 RX ORDER — HEPARIN SODIUM 5000 [USP'U]/ML
INJECTION, SOLUTION INTRAVENOUS; SUBCUTANEOUS
Status: COMPLETED
Start: 2021-03-03 | End: 2021-03-03

## 2021-03-03 RX ORDER — LIDOCAINE HYDROCHLORIDE 10 MG/ML
INJECTION, SOLUTION INFILTRATION; PERINEURAL
Status: COMPLETED
Start: 2021-03-03 | End: 2021-03-03

## 2021-03-03 RX ORDER — MIDAZOLAM HYDROCHLORIDE 1 MG/ML
INJECTION INTRAMUSCULAR; INTRAVENOUS
Status: COMPLETED
Start: 2021-03-03 | End: 2021-03-03

## 2021-03-03 RX ORDER — SODIUM CHLORIDE 9 MG/ML
INJECTION, SOLUTION INTRAVENOUS CONTINUOUS
Status: DISCONTINUED | OUTPATIENT
Start: 2021-03-03 | End: 2021-03-03

## 2021-03-03 NOTE — PROCEDURES
[facility]  Procedure Note    Neelam Navarrete Patient Status:  Outpatient in a Bed    1965 MRN [id number]   [facility] Attending Darrell Dancer, MD   Hosp Day # 0 PCP Cynthia Murcia MD     Procedure: IVC F

## 2021-03-03 NOTE — PROGRESS NOTES
Pt arrived from lab via cart. See assmt and vitals flowsheet. 1000: tolerating po fluids. Declined nourishment. Port decanulated after heparin flush. See mar. Discharge instructions reviewed w pt and spouse.     1010pt discharged to home via wheelchair

## 2021-03-03 NOTE — H&P
[address] Patient Status:  Outpatient in a Bed    1965 MRN [id number]   [facility] Turner Addison MD   Hosp Day # 0 PCP Mu Underwood MD     Admitting D week      Frequency: Monthly or less      Drinks per session: 1 or 2      Binge frequency: Never       Family History:  Family History   Problem Relation Age of Onset   • Cancer Maternal Grandmother    • Cancer Maternal Grandfather    • Cancer Paternal Gra

## 2021-03-12 DIAGNOSIS — Z23 NEED FOR VACCINATION: ICD-10-CM

## 2021-03-19 ENCOUNTER — IMMUNIZATION (OUTPATIENT)
Dept: LAB | Facility: HOSPITAL | Age: 56
End: 2021-03-19
Attending: HOSPITALIST
Payer: COMMERCIAL

## 2021-03-19 DIAGNOSIS — Z23 NEED FOR VACCINATION: Primary | ICD-10-CM

## 2021-03-19 PROCEDURE — 0011A SARSCOV2 VAC 100MCG/0.5ML IM: CPT

## 2021-04-16 ENCOUNTER — IMMUNIZATION (OUTPATIENT)
Dept: LAB | Facility: HOSPITAL | Age: 56
End: 2021-04-16
Attending: EMERGENCY MEDICINE
Payer: COMMERCIAL

## 2021-04-16 DIAGNOSIS — Z23 NEED FOR VACCINATION: Primary | ICD-10-CM

## 2021-04-16 PROCEDURE — 0012A SARSCOV2 VAC 100MCG/0.5ML IM: CPT

## 2022-01-01 ENCOUNTER — HOSPITAL ENCOUNTER (INPATIENT)
Facility: HOSPITAL | Age: 57
LOS: 10 days | Discharge: HOSPICE | DRG: 640 | End: 2022-01-01
Attending: EMERGENCY MEDICINE | Admitting: INTERNAL MEDICINE
Payer: COMMERCIAL

## 2022-01-01 ENCOUNTER — APPOINTMENT (OUTPATIENT)
Dept: CT IMAGING | Facility: HOSPITAL | Age: 57
DRG: 640 | End: 2022-01-01
Attending: EMERGENCY MEDICINE
Payer: COMMERCIAL

## 2022-01-01 ENCOUNTER — HOSPITAL ENCOUNTER (INPATIENT)
Facility: HOSPITAL | Age: 57
LOS: 15 days | DRG: 640 | End: 2022-01-01
Attending: INTERNAL MEDICINE | Admitting: INTERNAL MEDICINE
Payer: OTHER MISCELLANEOUS

## 2022-01-01 ENCOUNTER — APPOINTMENT (OUTPATIENT)
Dept: GENERAL RADIOLOGY | Facility: HOSPITAL | Age: 57
DRG: 640 | End: 2022-01-01
Attending: EMERGENCY MEDICINE
Payer: COMMERCIAL

## 2022-01-01 VITALS
DIASTOLIC BLOOD PRESSURE: 67 MMHG | RESPIRATION RATE: 16 BRPM | BODY MASS INDEX: 16.99 KG/M2 | HEIGHT: 66 IN | TEMPERATURE: 97 F | HEART RATE: 113 BPM | WEIGHT: 105.69 LBS | OXYGEN SATURATION: 100 % | SYSTOLIC BLOOD PRESSURE: 90 MMHG

## 2022-01-01 VITALS
OXYGEN SATURATION: 70 % | RESPIRATION RATE: 10 BRPM | HEART RATE: 130 BPM | SYSTOLIC BLOOD PRESSURE: 75 MMHG | DIASTOLIC BLOOD PRESSURE: 47 MMHG | TEMPERATURE: 98 F | BODY MASS INDEX: 17 KG/M2 | WEIGHT: 105.63 LBS

## 2022-01-01 DIAGNOSIS — C18.9 METASTATIC COLON CANCER IN FEMALE (HCC): ICD-10-CM

## 2022-01-01 DIAGNOSIS — R53.1 WEAKNESS GENERALIZED: Primary | ICD-10-CM

## 2022-01-01 LAB
ALBUMIN SERPL-MCNC: 1.6 G/DL (ref 3.4–5)
ALBUMIN SERPL-MCNC: 1.7 G/DL (ref 3.4–5)
ALBUMIN SERPL-MCNC: 2.6 G/DL (ref 3.4–5)
ALBUMIN SERPL-MCNC: 2.9 G/DL (ref 3.4–5)
ALBUMIN SERPL-MCNC: 2.9 G/DL (ref 3.4–5)
ALBUMIN SERPL-MCNC: 3 G/DL (ref 3.4–5)
ALBUMIN SERPL-MCNC: 3.2 G/DL (ref 3.4–5)
ALBUMIN SERPL-MCNC: 3.5 G/DL (ref 3.4–5)
ALBUMIN SERPL-MCNC: 3.7 G/DL (ref 3.4–5)
ALBUMIN/GLOB SERPL: 0.5 {RATIO} (ref 1–2)
ALBUMIN/GLOB SERPL: 0.7 {RATIO} (ref 1–2)
ALP LIVER SERPL-CCNC: 115 U/L
ALP LIVER SERPL-CCNC: 131 U/L
ALT SERPL-CCNC: 21 U/L
ALT SERPL-CCNC: 22 U/L
ANION GAP SERPL CALC-SCNC: 2 MMOL/L (ref 0–18)
ANION GAP SERPL CALC-SCNC: 4 MMOL/L (ref 0–18)
ANION GAP SERPL CALC-SCNC: 5 MMOL/L (ref 0–18)
ANION GAP SERPL CALC-SCNC: 5 MMOL/L (ref 0–18)
ANION GAP SERPL CALC-SCNC: 6 MMOL/L (ref 0–18)
ANION GAP SERPL CALC-SCNC: 7 MMOL/L (ref 0–18)
ANION GAP SERPL CALC-SCNC: 8 MMOL/L (ref 0–18)
AST SERPL-CCNC: 52 U/L (ref 15–37)
AST SERPL-CCNC: 58 U/L (ref 15–37)
ATRIAL RATE: 88 BPM
BASOPHILS # BLD AUTO: 0 X10(3) UL (ref 0–0.2)
BASOPHILS # BLD AUTO: 0.01 X10(3) UL (ref 0–0.2)
BASOPHILS # BLD AUTO: 0.02 X10(3) UL (ref 0–0.2)
BASOPHILS # BLD AUTO: 0.02 X10(3) UL (ref 0–0.2)
BASOPHILS NFR BLD AUTO: 0 %
BASOPHILS NFR BLD AUTO: 0.2 %
BASOPHILS NFR BLD AUTO: 0.3 %
BASOPHILS NFR BLD AUTO: 0.3 %
BILIRUB SERPL-MCNC: 0.4 MG/DL (ref 0.1–2)
BILIRUB SERPL-MCNC: 0.5 MG/DL (ref 0.1–2)
BILIRUB UR QL STRIP.AUTO: NEGATIVE
BILIRUB UR QL STRIP.AUTO: NEGATIVE
BUN BLD-MCNC: 10 MG/DL (ref 7–18)
BUN BLD-MCNC: 13 MG/DL (ref 7–18)
BUN BLD-MCNC: 13 MG/DL (ref 7–18)
BUN BLD-MCNC: 14 MG/DL (ref 7–18)
BUN BLD-MCNC: 14 MG/DL (ref 7–18)
BUN BLD-MCNC: 18 MG/DL (ref 7–18)
BUN BLD-MCNC: 6 MG/DL (ref 7–18)
BUN BLD-MCNC: 8 MG/DL (ref 7–18)
BUN BLD-MCNC: 8 MG/DL (ref 7–18)
CA-I BLD-SCNC: 1.03 MMOL/L (ref 0.95–1.32)
CALCIUM BLD-MCNC: 7 MG/DL (ref 8.5–10.1)
CALCIUM BLD-MCNC: 7.1 MG/DL (ref 8.5–10.1)
CALCIUM BLD-MCNC: 7.3 MG/DL (ref 8.5–10.1)
CALCIUM BLD-MCNC: 7.3 MG/DL (ref 8.5–10.1)
CALCIUM BLD-MCNC: 7.4 MG/DL (ref 8.5–10.1)
CALCIUM BLD-MCNC: 7.5 MG/DL (ref 8.5–10.1)
CALCIUM BLD-MCNC: 7.6 MG/DL (ref 8.5–10.1)
CHLORIDE SERPL-SCNC: 100 MMOL/L (ref 98–112)
CHLORIDE SERPL-SCNC: 100 MMOL/L (ref 98–112)
CHLORIDE SERPL-SCNC: 101 MMOL/L (ref 98–112)
CHLORIDE SERPL-SCNC: 103 MMOL/L (ref 98–112)
CHLORIDE SERPL-SCNC: 105 MMOL/L (ref 98–112)
CHLORIDE SERPL-SCNC: 106 MMOL/L (ref 98–112)
CHLORIDE SERPL-SCNC: 108 MMOL/L (ref 98–112)
CHLORIDE SERPL-SCNC: 108 MMOL/L (ref 98–112)
CHLORIDE SERPL-SCNC: 97 MMOL/L (ref 98–112)
CHLORIDE SERPL-SCNC: 98 MMOL/L (ref 98–112)
CLARITY UR REFRACT.AUTO: CLEAR
CLARITY UR REFRACT.AUTO: CLEAR
CO2 SERPL-SCNC: 20 MMOL/L (ref 21–32)
CO2 SERPL-SCNC: 21 MMOL/L (ref 21–32)
CO2 SERPL-SCNC: 22 MMOL/L (ref 21–32)
CO2 SERPL-SCNC: 23 MMOL/L (ref 21–32)
CO2 SERPL-SCNC: 23 MMOL/L (ref 21–32)
CO2 SERPL-SCNC: 24 MMOL/L (ref 21–32)
CO2 SERPL-SCNC: 25 MMOL/L (ref 21–32)
CO2 SERPL-SCNC: 25 MMOL/L (ref 21–32)
CO2 SERPL-SCNC: 27 MMOL/L (ref 21–32)
COLOR UR AUTO: YELLOW
COLOR UR AUTO: YELLOW
CREAT BLD-MCNC: 0.58 MG/DL
CREAT BLD-MCNC: 0.59 MG/DL
CREAT BLD-MCNC: 0.62 MG/DL
CREAT BLD-MCNC: 0.63 MG/DL
CREAT BLD-MCNC: 0.63 MG/DL
CREAT BLD-MCNC: 0.65 MG/DL
CREAT BLD-MCNC: 0.68 MG/DL
CREAT BLD-MCNC: 0.7 MG/DL
CREAT BLD-MCNC: 0.7 MG/DL
CREAT BLD-MCNC: 0.71 MG/DL
CREAT BLD-MCNC: 0.74 MG/DL
CREAT BLD-MCNC: 0.83 MG/DL
EOSINOPHIL # BLD AUTO: 0 X10(3) UL (ref 0–0.7)
EOSINOPHIL # BLD AUTO: 0 X10(3) UL (ref 0–0.7)
EOSINOPHIL # BLD AUTO: 0.01 X10(3) UL (ref 0–0.7)
EOSINOPHIL # BLD AUTO: 0.06 X10(3) UL (ref 0–0.7)
EOSINOPHIL # BLD AUTO: 0.19 X10(3) UL (ref 0–0.7)
EOSINOPHIL # BLD AUTO: 0.39 X10(3) UL (ref 0–0.7)
EOSINOPHIL # BLD AUTO: 0.74 X10(3) UL (ref 0–0.7)
EOSINOPHIL # BLD AUTO: 0.77 X10(3) UL (ref 0–0.7)
EOSINOPHIL NFR BLD AUTO: 0 %
EOSINOPHIL NFR BLD AUTO: 0 %
EOSINOPHIL NFR BLD AUTO: 0.2 %
EOSINOPHIL NFR BLD AUTO: 1.3 %
EOSINOPHIL NFR BLD AUTO: 10.2 %
EOSINOPHIL NFR BLD AUTO: 11.8 %
EOSINOPHIL NFR BLD AUTO: 3.3 %
EOSINOPHIL NFR BLD AUTO: 5.4 %
ERYTHROCYTE [DISTWIDTH] IN BLOOD BY AUTOMATED COUNT: 15.3 %
ERYTHROCYTE [DISTWIDTH] IN BLOOD BY AUTOMATED COUNT: 15.4 %
ERYTHROCYTE [DISTWIDTH] IN BLOOD BY AUTOMATED COUNT: 15.8 %
ERYTHROCYTE [DISTWIDTH] IN BLOOD BY AUTOMATED COUNT: 15.9 %
ERYTHROCYTE [DISTWIDTH] IN BLOOD BY AUTOMATED COUNT: 16 %
ERYTHROCYTE [DISTWIDTH] IN BLOOD BY AUTOMATED COUNT: 16 %
GFR SERPLBLD BASED ON 1.73 SQ M-ARVRAT: 101 ML/MIN/1.73M2 (ref 60–?)
GFR SERPLBLD BASED ON 1.73 SQ M-ARVRAT: 101 ML/MIN/1.73M2 (ref 60–?)
GFR SERPLBLD BASED ON 1.73 SQ M-ARVRAT: 102 ML/MIN/1.73M2 (ref 60–?)
GFR SERPLBLD BASED ON 1.73 SQ M-ARVRAT: 103 ML/MIN/1.73M2 (ref 60–?)
GFR SERPLBLD BASED ON 1.73 SQ M-ARVRAT: 104 ML/MIN/1.73M2 (ref 60–?)
GFR SERPLBLD BASED ON 1.73 SQ M-ARVRAT: 105 ML/MIN/1.73M2 (ref 60–?)
GFR SERPLBLD BASED ON 1.73 SQ M-ARVRAT: 105 ML/MIN/1.73M2 (ref 60–?)
GFR SERPLBLD BASED ON 1.73 SQ M-ARVRAT: 82 ML/MIN/1.73M2 (ref 60–?)
GFR SERPLBLD BASED ON 1.73 SQ M-ARVRAT: 94 ML/MIN/1.73M2 (ref 60–?)
GFR SERPLBLD BASED ON 1.73 SQ M-ARVRAT: 99 ML/MIN/1.73M2 (ref 60–?)
GLOBULIN PLAS-MCNC: 2.6 G/DL (ref 2.8–4.4)
GLOBULIN PLAS-MCNC: 3.2 G/DL (ref 2.8–4.4)
GLUCOSE BLD-MCNC: 104 MG/DL (ref 70–99)
GLUCOSE BLD-MCNC: 107 MG/DL (ref 70–99)
GLUCOSE BLD-MCNC: 111 MG/DL (ref 70–99)
GLUCOSE BLD-MCNC: 112 MG/DL (ref 70–99)
GLUCOSE BLD-MCNC: 114 MG/DL (ref 70–99)
GLUCOSE BLD-MCNC: 114 MG/DL (ref 70–99)
GLUCOSE BLD-MCNC: 115 MG/DL (ref 70–99)
GLUCOSE BLD-MCNC: 126 MG/DL (ref 70–99)
GLUCOSE BLD-MCNC: 160 MG/DL (ref 70–99)
GLUCOSE BLD-MCNC: 207 MG/DL (ref 70–99)
GLUCOSE BLD-MCNC: 87 MG/DL (ref 70–99)
GLUCOSE BLD-MCNC: 87 MG/DL (ref 70–99)
GLUCOSE BLD-MCNC: 93 MG/DL (ref 70–99)
GLUCOSE UR STRIP.AUTO-MCNC: 50 MG/DL
GLUCOSE UR STRIP.AUTO-MCNC: NEGATIVE MG/DL
HCT VFR BLD AUTO: 25.5 %
HCT VFR BLD AUTO: 27.8 %
HCT VFR BLD AUTO: 29.2 %
HCT VFR BLD AUTO: 30.3 %
HCT VFR BLD AUTO: 30.6 %
HCT VFR BLD AUTO: 31.2 %
HCT VFR BLD AUTO: 31.3 %
HCT VFR BLD AUTO: 31.5 %
HGB BLD-MCNC: 10 G/DL
HGB BLD-MCNC: 10.2 G/DL
HGB BLD-MCNC: 10.3 G/DL
HGB BLD-MCNC: 8.6 G/DL
HGB BLD-MCNC: 9.2 G/DL
HGB BLD-MCNC: 9.3 G/DL
HGB BLD-MCNC: 9.9 G/DL
HGB BLD-MCNC: 9.9 G/DL
IMM GRANULOCYTES # BLD AUTO: 0.02 X10(3) UL (ref 0–1)
IMM GRANULOCYTES # BLD AUTO: 0.03 X10(3) UL (ref 0–1)
IMM GRANULOCYTES # BLD AUTO: 0.05 X10(3) UL (ref 0–1)
IMM GRANULOCYTES NFR BLD: 0.3 %
IMM GRANULOCYTES NFR BLD: 0.3 %
IMM GRANULOCYTES NFR BLD: 0.4 %
IMM GRANULOCYTES NFR BLD: 0.5 %
IMM GRANULOCYTES NFR BLD: 0.6 %
IMM GRANULOCYTES NFR BLD: 0.7 %
IMM GRANULOCYTES NFR BLD: 0.7 %
IMM GRANULOCYTES NFR BLD: 0.8 %
KETONES UR STRIP.AUTO-MCNC: NEGATIVE MG/DL
LEUKOCYTE ESTERASE UR QL STRIP.AUTO: NEGATIVE
LEUKOCYTE ESTERASE UR QL STRIP.AUTO: NEGATIVE
LYMPHOCYTES # BLD AUTO: 0.28 X10(3) UL (ref 1–4)
LYMPHOCYTES # BLD AUTO: 0.35 X10(3) UL (ref 1–4)
LYMPHOCYTES # BLD AUTO: 0.38 X10(3) UL (ref 1–4)
LYMPHOCYTES # BLD AUTO: 0.38 X10(3) UL (ref 1–4)
LYMPHOCYTES # BLD AUTO: 0.51 X10(3) UL (ref 1–4)
LYMPHOCYTES # BLD AUTO: 0.56 X10(3) UL (ref 1–4)
LYMPHOCYTES # BLD AUTO: 0.65 X10(3) UL (ref 1–4)
LYMPHOCYTES # BLD AUTO: 0.75 X10(3) UL (ref 1–4)
LYMPHOCYTES NFR BLD AUTO: 12 %
LYMPHOCYTES NFR BLD AUTO: 4.9 %
LYMPHOCYTES NFR BLD AUTO: 6.7 %
LYMPHOCYTES NFR BLD AUTO: 7 %
LYMPHOCYTES NFR BLD AUTO: 8.5 %
LYMPHOCYTES NFR BLD AUTO: 8.6 %
LYMPHOCYTES NFR BLD AUTO: 9.2 %
LYMPHOCYTES NFR BLD AUTO: 9.7 %
MAGNESIUM SERPL-MCNC: 2.3 MG/DL (ref 1.6–2.6)
MAGNESIUM SERPL-MCNC: 2.4 MG/DL (ref 1.6–2.6)
MAGNESIUM SERPL-MCNC: 2.5 MG/DL (ref 1.6–2.6)
MAGNESIUM SERPL-MCNC: 2.6 MG/DL (ref 1.6–2.6)
MCH RBC QN AUTO: 26.8 PG (ref 26–34)
MCH RBC QN AUTO: 27 PG (ref 26–34)
MCH RBC QN AUTO: 27.1 PG (ref 26–34)
MCH RBC QN AUTO: 27.1 PG (ref 26–34)
MCH RBC QN AUTO: 27.2 PG (ref 26–34)
MCH RBC QN AUTO: 27.4 PG (ref 26–34)
MCHC RBC AUTO-ENTMCNC: 31.6 G/DL (ref 31–37)
MCHC RBC AUTO-ENTMCNC: 31.8 G/DL (ref 31–37)
MCHC RBC AUTO-ENTMCNC: 32.4 G/DL (ref 31–37)
MCHC RBC AUTO-ENTMCNC: 32.7 G/DL (ref 31–37)
MCHC RBC AUTO-ENTMCNC: 32.7 G/DL (ref 31–37)
MCHC RBC AUTO-ENTMCNC: 33 G/DL (ref 31–37)
MCHC RBC AUTO-ENTMCNC: 33.1 G/DL (ref 31–37)
MCHC RBC AUTO-ENTMCNC: 33.7 G/DL (ref 31–37)
MCV RBC AUTO: 81.2 FL
MCV RBC AUTO: 82.1 FL
MCV RBC AUTO: 82.2 FL
MCV RBC AUTO: 82.7 FL
MCV RBC AUTO: 82.8 FL
MCV RBC AUTO: 83.4 FL
MCV RBC AUTO: 84.6 FL
MCV RBC AUTO: 84.6 FL
MONOCYTES # BLD AUTO: 0.12 X10(3) UL (ref 0.1–1)
MONOCYTES # BLD AUTO: 0.16 X10(3) UL (ref 0.1–1)
MONOCYTES # BLD AUTO: 0.23 X10(3) UL (ref 0.1–1)
MONOCYTES # BLD AUTO: 0.25 X10(3) UL (ref 0.1–1)
MONOCYTES # BLD AUTO: 0.36 X10(3) UL (ref 0.1–1)
MONOCYTES # BLD AUTO: 0.4 X10(3) UL (ref 0.1–1)
MONOCYTES # BLD AUTO: 0.5 X10(3) UL (ref 0.1–1)
MONOCYTES # BLD AUTO: 0.6 X10(3) UL (ref 0.1–1)
MONOCYTES NFR BLD AUTO: 2.1 %
MONOCYTES NFR BLD AUTO: 2.2 %
MONOCYTES NFR BLD AUTO: 3 %
MONOCYTES NFR BLD AUTO: 5.6 %
MONOCYTES NFR BLD AUTO: 6.4 %
MONOCYTES NFR BLD AUTO: 9.2 %
MONOCYTES NFR BLD AUTO: 9.6 %
MONOCYTES NFR BLD AUTO: 9.9 %
NEUTROPHILS # BLD AUTO: 3.13 X10 (3) UL (ref 1.5–7.7)
NEUTROPHILS # BLD AUTO: 3.13 X10(3) UL (ref 1.5–7.7)
NEUTROPHILS # BLD AUTO: 3.75 X10 (3) UL (ref 1.5–7.7)
NEUTROPHILS # BLD AUTO: 3.75 X10(3) UL (ref 1.5–7.7)
NEUTROPHILS # BLD AUTO: 4.32 X10 (3) UL (ref 1.5–7.7)
NEUTROPHILS # BLD AUTO: 4.32 X10(3) UL (ref 1.5–7.7)
NEUTROPHILS # BLD AUTO: 4.35 X10 (3) UL (ref 1.5–7.7)
NEUTROPHILS # BLD AUTO: 4.35 X10(3) UL (ref 1.5–7.7)
NEUTROPHILS # BLD AUTO: 4.86 X10 (3) UL (ref 1.5–7.7)
NEUTROPHILS # BLD AUTO: 4.86 X10(3) UL (ref 1.5–7.7)
NEUTROPHILS # BLD AUTO: 5.07 X10 (3) UL (ref 1.5–7.7)
NEUTROPHILS # BLD AUTO: 5.07 X10(3) UL (ref 1.5–7.7)
NEUTROPHILS # BLD AUTO: 5.86 X10 (3) UL (ref 1.5–7.7)
NEUTROPHILS # BLD AUTO: 5.86 X10(3) UL (ref 1.5–7.7)
NEUTROPHILS # BLD AUTO: 6.18 X10 (3) UL (ref 1.5–7.7)
NEUTROPHILS # BLD AUTO: 6.18 X10(3) UL (ref 1.5–7.7)
NEUTROPHILS NFR BLD AUTO: 69.1 %
NEUTROPHILS NFR BLD AUTO: 77.2 %
NEUTROPHILS NFR BLD AUTO: 80.2 %
NEUTROPHILS NFR BLD AUTO: 80.3 %
NEUTROPHILS NFR BLD AUTO: 83.1 %
NEUTROPHILS NFR BLD AUTO: 83.7 %
NEUTROPHILS NFR BLD AUTO: 84.8 %
NEUTROPHILS NFR BLD AUTO: 89.3 %
NITRITE UR QL STRIP.AUTO: NEGATIVE
NITRITE UR QL STRIP.AUTO: NEGATIVE
OSMOLALITY SERPL CALC.SUM OF ELEC: 265 MOSM/KG (ref 275–295)
OSMOLALITY SERPL CALC.SUM OF ELEC: 266 MOSM/KG (ref 275–295)
OSMOLALITY SERPL CALC.SUM OF ELEC: 270 MOSM/KG (ref 275–295)
OSMOLALITY SERPL CALC.SUM OF ELEC: 271 MOSM/KG (ref 275–295)
OSMOLALITY SERPL CALC.SUM OF ELEC: 272 MOSM/KG (ref 275–295)
OSMOLALITY SERPL CALC.SUM OF ELEC: 273 MOSM/KG (ref 275–295)
OSMOLALITY SERPL CALC.SUM OF ELEC: 274 MOSM/KG (ref 275–295)
OSMOLALITY SERPL CALC.SUM OF ELEC: 275 MOSM/KG (ref 275–295)
OSMOLALITY SERPL CALC.SUM OF ELEC: 279 MOSM/KG (ref 275–295)
OSMOLALITY SERPL CALC.SUM OF ELEC: 279 MOSM/KG (ref 275–295)
OSMOLALITY SERPL CALC.SUM OF ELEC: 280 MOSM/KG (ref 275–295)
OSMOLALITY SERPL CALC.SUM OF ELEC: 281 MOSM/KG (ref 275–295)
OSMOLALITY UR: 701 MOSM/KG (ref 300–1300)
P AXIS: 78 DEGREES
P-R INTERVAL: 122 MS
PH UR STRIP.AUTO: 6 [PH] (ref 5–8)
PH UR STRIP.AUTO: 6 [PH] (ref 5–8)
PHOSPHATE SERPL-MCNC: 1 MG/DL (ref 2.5–4.9)
PHOSPHATE SERPL-MCNC: 1.5 MG/DL (ref 2.5–4.9)
PHOSPHATE SERPL-MCNC: 1.5 MG/DL (ref 2.5–4.9)
PHOSPHATE SERPL-MCNC: 1.7 MG/DL (ref 2.5–4.9)
PHOSPHATE SERPL-MCNC: 1.7 MG/DL (ref 2.5–4.9)
PHOSPHATE SERPL-MCNC: 2.1 MG/DL (ref 2.5–4.9)
PHOSPHATE SERPL-MCNC: 2.3 MG/DL (ref 2.5–4.9)
PHOSPHATE SERPL-MCNC: 2.7 MG/DL (ref 2.5–4.9)
PLATELET # BLD AUTO: 193 10(3)UL (ref 150–450)
PLATELET # BLD AUTO: 199 10(3)UL (ref 150–450)
PLATELET # BLD AUTO: 213 10(3)UL (ref 150–450)
PLATELET # BLD AUTO: 229 10(3)UL (ref 150–450)
PLATELET # BLD AUTO: 229 10(3)UL (ref 150–450)
PLATELET # BLD AUTO: 254 10(3)UL (ref 150–450)
PLATELET # BLD AUTO: 262 10(3)UL (ref 150–450)
PLATELET # BLD AUTO: 282 10(3)UL (ref 150–450)
POTASSIUM SERPL-SCNC: 3.5 MMOL/L (ref 3.5–5.1)
POTASSIUM SERPL-SCNC: 3.6 MMOL/L (ref 3.5–5.1)
POTASSIUM SERPL-SCNC: 3.7 MMOL/L (ref 3.5–5.1)
POTASSIUM SERPL-SCNC: 4 MMOL/L (ref 3.5–5.1)
POTASSIUM SERPL-SCNC: 4 MMOL/L (ref 3.5–5.1)
POTASSIUM SERPL-SCNC: 4.1 MMOL/L (ref 3.5–5.1)
POTASSIUM SERPL-SCNC: 4.1 MMOL/L (ref 3.5–5.1)
POTASSIUM SERPL-SCNC: 4.2 MMOL/L (ref 3.5–5.1)
POTASSIUM SERPL-SCNC: 4.2 MMOL/L (ref 3.5–5.1)
POTASSIUM SERPL-SCNC: 4.5 MMOL/L (ref 3.5–5.1)
PROT SERPL-MCNC: 4.3 G/DL (ref 6.4–8.2)
PROT SERPL-MCNC: 4.8 G/DL (ref 6.4–8.2)
PROT UR STRIP.AUTO-MCNC: 30 MG/DL
Q-T INTERVAL: 362 MS
QRS DURATION: 86 MS
QTC CALCULATION (BEZET): 438 MS
R AXIS: 90 DEGREES
RBC # BLD AUTO: 3.14 X10(6)UL
RBC # BLD AUTO: 3.38 X10(6)UL
RBC # BLD AUTO: 3.45 X10(6)UL
RBC # BLD AUTO: 3.67 X10(6)UL
RBC # BLD AUTO: 3.69 X10(6)UL
RBC # BLD AUTO: 3.7 X10(6)UL
RBC # BLD AUTO: 3.77 X10(6)UL
RBC # BLD AUTO: 3.81 X10(6)UL
RBC UR QL AUTO: NEGATIVE
RBC UR QL AUTO: NEGATIVE
SARS-COV-2 RNA RESP QL NAA+PROBE: NOT DETECTED
SODIUM SERPL-SCNC: 128 MMOL/L (ref 136–145)
SODIUM SERPL-SCNC: 128 MMOL/L (ref 136–145)
SODIUM SERPL-SCNC: 130 MMOL/L (ref 136–145)
SODIUM SERPL-SCNC: 130 MMOL/L (ref 136–145)
SODIUM SERPL-SCNC: 131 MMOL/L (ref 136–145)
SODIUM SERPL-SCNC: 131 MMOL/L (ref 136–145)
SODIUM SERPL-SCNC: 133 MMOL/L (ref 136–145)
SODIUM SERPL-SCNC: 133 MMOL/L (ref 136–145)
SODIUM SERPL-SCNC: 134 MMOL/L (ref 136–145)
SODIUM SERPL-SCNC: 134 MMOL/L (ref 136–145)
SODIUM SERPL-SCNC: 135 MMOL/L (ref 136–145)
SODIUM SERPL-SCNC: 135 MMOL/L (ref 136–145)
SODIUM SERPL-SCNC: 6 MMOL/L
SP GR UR STRIP.AUTO: 1.02 (ref 1–1.03)
SP GR UR STRIP.AUTO: 1.02 (ref 1–1.03)
T AXIS: 71 DEGREES
TROPONIN I HIGH SENSITIVITY: 4 NG/L
URATE UR-MCNC: >80 MG/DL
UROBILINOGEN UR STRIP.AUTO-MCNC: 0.2 MG/DL
UROBILINOGEN UR STRIP.AUTO-MCNC: <2 MG/DL
VENTRICULAR RATE: 88 BPM
WBC # BLD AUTO: 3.9 X10(3) UL (ref 4–11)
WBC # BLD AUTO: 4.5 X10(3) UL (ref 4–11)
WBC # BLD AUTO: 5.2 X10(3) UL (ref 4–11)
WBC # BLD AUTO: 5.7 X10(3) UL (ref 4–11)
WBC # BLD AUTO: 6.1 X10(3) UL (ref 4–11)
WBC # BLD AUTO: 6.3 X10(3) UL (ref 4–11)
WBC # BLD AUTO: 7.3 X10(3) UL (ref 4–11)
WBC # BLD AUTO: 7.6 X10(3) UL (ref 4–11)

## 2022-01-01 PROCEDURE — 99233 SBSQ HOSP IP/OBS HIGH 50: CPT | Performed by: CLINICAL NURSE SPECIALIST

## 2022-01-01 PROCEDURE — 99232 SBSQ HOSP IP/OBS MODERATE 35: CPT | Performed by: CLINICAL NURSE SPECIALIST

## 2022-01-01 PROCEDURE — 99253 IP/OBS CNSLTJ NEW/EST LOW 45: CPT | Performed by: CLINICAL NURSE SPECIALIST

## 2022-01-01 PROCEDURE — 71045 X-RAY EXAM CHEST 1 VIEW: CPT | Performed by: EMERGENCY MEDICINE

## 2022-01-01 PROCEDURE — 99233 SBSQ HOSP IP/OBS HIGH 50: CPT | Performed by: STUDENT IN AN ORGANIZED HEALTH CARE EDUCATION/TRAINING PROGRAM

## 2022-01-01 PROCEDURE — 70450 CT HEAD/BRAIN W/O DYE: CPT | Performed by: EMERGENCY MEDICINE

## 2022-01-01 RX ORDER — CALCIUM GLUCONATE 20 MG/ML
2 INJECTION, SOLUTION INTRAVENOUS ONCE
Status: COMPLETED | OUTPATIENT
Start: 2022-01-01 | End: 2022-01-01

## 2022-01-01 RX ORDER — HYDROMORPHONE HYDROCHLORIDE 2 MG/1
2 TABLET ORAL
Status: DISCONTINUED | OUTPATIENT
Start: 2022-01-01 | End: 2022-01-01

## 2022-01-01 RX ORDER — POLYETHYLENE GLYCOL 3350 17 G/17G
17 POWDER, FOR SOLUTION ORAL DAILY PRN
Status: DISCONTINUED | OUTPATIENT
Start: 2022-01-01 | End: 2022-01-01

## 2022-01-01 RX ORDER — DIAZEPAM 5 MG/ML
5 INJECTION, SOLUTION INTRAMUSCULAR; INTRAVENOUS EVERY 6 HOURS PRN
Status: DISCONTINUED | OUTPATIENT
Start: 2022-01-01 | End: 2022-01-01

## 2022-01-01 RX ORDER — ACETAMINOPHEN 500 MG
500 TABLET ORAL EVERY 4 HOURS PRN
Status: DISCONTINUED | OUTPATIENT
Start: 2022-01-01 | End: 2022-01-01

## 2022-01-01 RX ORDER — DIPHENHYDRAMINE HYDROCHLORIDE 50 MG/ML
12.5 INJECTION INTRAMUSCULAR; INTRAVENOUS EVERY 4 HOURS PRN
Status: CANCELLED | OUTPATIENT
Start: 2022-01-01

## 2022-01-01 RX ORDER — HYDROMORPHONE HYDROCHLORIDE 1 MG/ML
0.5 INJECTION, SOLUTION INTRAMUSCULAR; INTRAVENOUS; SUBCUTANEOUS ONCE
Status: COMPLETED | OUTPATIENT
Start: 2022-01-01 | End: 2022-01-01

## 2022-01-01 RX ORDER — MULTIPLE VITAMINS W/ MINERALS TAB 9MG-400MCG
1 TAB ORAL DAILY
Status: DISCONTINUED | OUTPATIENT
Start: 2022-01-01 | End: 2022-01-01

## 2022-01-01 RX ORDER — ONDANSETRON 2 MG/ML
4 INJECTION INTRAMUSCULAR; INTRAVENOUS EVERY 8 HOURS
Status: CANCELLED | OUTPATIENT
Start: 2022-01-01

## 2022-01-01 RX ORDER — SENNOSIDES 8.6 MG
8.6 TABLET ORAL 2 TIMES DAILY
Status: DISCONTINUED | OUTPATIENT
Start: 2022-01-01 | End: 2022-01-01

## 2022-01-01 RX ORDER — SODIUM CHLORIDE 9 MG/ML
INJECTION, SOLUTION INTRAVENOUS CONTINUOUS
Status: DISCONTINUED | OUTPATIENT
Start: 2022-01-01 | End: 2022-01-01

## 2022-01-01 RX ORDER — HYDROMORPHONE HYDROCHLORIDE 1 MG/ML
0.5 INJECTION, SOLUTION INTRAMUSCULAR; INTRAVENOUS; SUBCUTANEOUS EVERY 4 HOURS PRN
Status: DISCONTINUED | OUTPATIENT
Start: 2022-01-01 | End: 2022-01-01

## 2022-01-01 RX ORDER — DEXAMETHASONE 4 MG/1
4 TABLET ORAL AS DIRECTED
COMMUNITY

## 2022-01-01 RX ORDER — PROCHLORPERAZINE EDISYLATE 5 MG/ML
5 INJECTION INTRAMUSCULAR; INTRAVENOUS EVERY 8 HOURS PRN
Status: CANCELLED | OUTPATIENT
Start: 2022-01-01

## 2022-01-01 RX ORDER — HYDROMORPHONE HYDROCHLORIDE 1 MG/ML
0.8 INJECTION, SOLUTION INTRAMUSCULAR; INTRAVENOUS; SUBCUTANEOUS EVERY 2 HOUR PRN
Status: DISCONTINUED | OUTPATIENT
Start: 2022-01-01 | End: 2022-01-01

## 2022-01-01 RX ORDER — HYDROMORPHONE HYDROCHLORIDE 1 MG/ML
1.5 INJECTION, SOLUTION INTRAMUSCULAR; INTRAVENOUS; SUBCUTANEOUS EVERY 2 HOUR PRN
Status: DISCONTINUED | OUTPATIENT
Start: 2022-01-01 | End: 2022-01-01

## 2022-01-01 RX ORDER — HALOPERIDOL 5 MG/ML
1 INJECTION INTRAMUSCULAR
Status: DISCONTINUED | OUTPATIENT
Start: 2022-01-01 | End: 2022-01-01

## 2022-01-01 RX ORDER — NALOXONE HYDROCHLORIDE 0.4 MG/ML
0.08 INJECTION, SOLUTION INTRAMUSCULAR; INTRAVENOUS; SUBCUTANEOUS
Status: DISCONTINUED | OUTPATIENT
Start: 2022-01-01 | End: 2022-01-01

## 2022-01-01 RX ORDER — DIAZEPAM 5 MG/ML
2.5 INJECTION, SOLUTION INTRAMUSCULAR; INTRAVENOUS EVERY 6 HOURS PRN
Status: CANCELLED | OUTPATIENT
Start: 2022-01-01

## 2022-01-01 RX ORDER — SODIUM CHLORIDE, SODIUM LACTATE, POTASSIUM CHLORIDE, CALCIUM CHLORIDE 600; 310; 30; 20 MG/100ML; MG/100ML; MG/100ML; MG/100ML
INJECTION, SOLUTION INTRAVENOUS CONTINUOUS
Status: CANCELLED | OUTPATIENT
Start: 2022-01-01

## 2022-01-01 RX ORDER — DEXAMETHASONE SODIUM PHOSPHATE 4 MG/ML
4 VIAL (ML) INJECTION 2 TIMES DAILY
Status: DISCONTINUED | OUTPATIENT
Start: 2022-01-01 | End: 2022-01-01

## 2022-01-01 RX ORDER — ONDANSETRON 2 MG/ML
4 INJECTION INTRAMUSCULAR; INTRAVENOUS EVERY 6 HOURS PRN
Status: DISCONTINUED | OUTPATIENT
Start: 2022-01-01 | End: 2022-01-01

## 2022-01-01 RX ORDER — SODIUM CHLORIDE, SODIUM LACTATE, POTASSIUM CHLORIDE, CALCIUM CHLORIDE 600; 310; 30; 20 MG/100ML; MG/100ML; MG/100ML; MG/100ML
INJECTION, SOLUTION INTRAVENOUS CONTINUOUS
Status: DISCONTINUED | OUTPATIENT
Start: 2022-01-01 | End: 2022-01-01

## 2022-01-01 RX ORDER — CALCIUM CARBONATE 200(500)MG
1000 TABLET,CHEWABLE ORAL EVERY 6 HOURS PRN
Status: DISCONTINUED | OUTPATIENT
Start: 2022-01-01 | End: 2022-01-01

## 2022-01-01 RX ORDER — SCOLOPAMINE TRANSDERMAL SYSTEM 1 MG/1
1 PATCH, EXTENDED RELEASE TRANSDERMAL
Status: DISCONTINUED | OUTPATIENT
Start: 2022-01-01 | End: 2022-01-01

## 2022-01-01 RX ORDER — METOCLOPRAMIDE 10 MG/1
10 TABLET ORAL EVERY 6 HOURS PRN
Status: DISCONTINUED | OUTPATIENT
Start: 2022-01-01 | End: 2022-01-01

## 2022-01-01 RX ORDER — PROCHLORPERAZINE EDISYLATE 5 MG/ML
5 INJECTION INTRAMUSCULAR; INTRAVENOUS EVERY 8 HOURS PRN
Status: DISCONTINUED | OUTPATIENT
Start: 2022-01-01 | End: 2022-01-01

## 2022-01-01 RX ORDER — HYDROMORPHONE HYDROCHLORIDE 1 MG/ML
1 INJECTION, SOLUTION INTRAMUSCULAR; INTRAVENOUS; SUBCUTANEOUS ONCE
Status: DISCONTINUED | OUTPATIENT
Start: 2022-01-01 | End: 2022-01-01

## 2022-01-01 RX ORDER — ALBUMIN (HUMAN) 12.5 G/50ML
25 SOLUTION INTRAVENOUS EVERY 6 HOURS
Status: COMPLETED | OUTPATIENT
Start: 2022-01-01 | End: 2022-01-01

## 2022-01-01 RX ORDER — ACETAMINOPHEN 500 MG
500 TABLET ORAL EVERY 4 HOURS PRN
Status: CANCELLED | OUTPATIENT
Start: 2022-01-01

## 2022-01-01 RX ORDER — FENTANYL 25 UG/H
1 PATCH TRANSDERMAL
Status: DISCONTINUED | OUTPATIENT
Start: 2022-01-01 | End: 2022-01-01

## 2022-01-01 RX ORDER — BISACODYL 10 MG
10 SUPPOSITORY, RECTAL RECTAL
Status: DISCONTINUED | OUTPATIENT
Start: 2022-01-01 | End: 2022-01-01

## 2022-01-01 RX ORDER — METOCLOPRAMIDE HYDROCHLORIDE 5 MG/ML
10 INJECTION INTRAMUSCULAR; INTRAVENOUS EVERY 6 HOURS PRN
Status: DISCONTINUED | OUTPATIENT
Start: 2022-01-01 | End: 2022-01-01

## 2022-01-01 RX ORDER — ONDANSETRON 2 MG/ML
4 INJECTION INTRAMUSCULAR; INTRAVENOUS EVERY 8 HOURS
Status: DISCONTINUED | OUTPATIENT
Start: 2022-01-01 | End: 2022-01-01

## 2022-01-01 RX ORDER — HYDROMORPHONE HYDROCHLORIDE 1 MG/ML
1 INJECTION, SOLUTION INTRAMUSCULAR; INTRAVENOUS; SUBCUTANEOUS EVERY 2 HOUR PRN
Status: DISCONTINUED | OUTPATIENT
Start: 2022-01-01 | End: 2022-01-01

## 2022-01-01 RX ORDER — DIPHENHYDRAMINE HYDROCHLORIDE 50 MG/ML
12.5 INJECTION INTRAMUSCULAR; INTRAVENOUS EVERY 4 HOURS PRN
Status: DISCONTINUED | OUTPATIENT
Start: 2022-01-01 | End: 2022-01-01

## 2022-01-01 RX ORDER — HALOPERIDOL 5 MG/ML
2 INJECTION INTRAMUSCULAR
Status: DISCONTINUED | OUTPATIENT
Start: 2022-01-01 | End: 2022-01-01

## 2022-01-01 RX ORDER — GLYCOPYRROLATE 0.2 MG/ML
0.4 INJECTION, SOLUTION INTRAMUSCULAR; INTRAVENOUS
Status: DISCONTINUED | OUTPATIENT
Start: 2022-01-01 | End: 2022-01-01

## 2022-01-01 RX ORDER — CALCIUM GLUCONATE 10 MG/ML
1 INJECTION, SOLUTION INTRAVENOUS ONCE
Status: COMPLETED | OUTPATIENT
Start: 2022-01-01 | End: 2022-01-01

## 2022-01-01 RX ORDER — CLONAZEPAM 0.25 MG/1
0.25 TABLET, ORALLY DISINTEGRATING ORAL 2 TIMES DAILY PRN
Status: DISCONTINUED | OUTPATIENT
Start: 2022-01-01 | End: 2022-01-01

## 2022-01-01 RX ORDER — SODIUM CHLORIDE 9 MG/ML
INJECTION, SOLUTION INTRAVENOUS CONTINUOUS
Status: ACTIVE | OUTPATIENT
Start: 2022-01-01 | End: 2022-01-01

## 2022-01-01 RX ORDER — DIAZEPAM 5 MG/ML
5 INJECTION, SOLUTION INTRAMUSCULAR; INTRAVENOUS ONCE
Status: COMPLETED | OUTPATIENT
Start: 2022-01-01 | End: 2022-01-01

## 2022-01-01 RX ORDER — ATROPINE SULFATE 10 MG/ML
2 SOLUTION/ DROPS OPHTHALMIC EVERY 2 HOUR PRN
Status: DISCONTINUED | OUTPATIENT
Start: 2022-01-01 | End: 2022-01-01

## 2022-01-01 RX ORDER — DIAZEPAM 5 MG/ML
2.5 INJECTION, SOLUTION INTRAMUSCULAR; INTRAVENOUS EVERY 6 HOURS PRN
Status: DISCONTINUED | OUTPATIENT
Start: 2022-01-01 | End: 2022-01-01

## 2022-01-01 RX ORDER — HYDROMORPHONE HYDROCHLORIDE 1 MG/ML
1 INJECTION, SOLUTION INTRAMUSCULAR; INTRAVENOUS; SUBCUTANEOUS ONCE
Status: COMPLETED | OUTPATIENT
Start: 2022-01-01 | End: 2022-01-01

## 2022-01-01 RX ORDER — HYDROMORPHONE HYDROCHLORIDE 1 MG/ML
0.4 INJECTION, SOLUTION INTRAMUSCULAR; INTRAVENOUS; SUBCUTANEOUS EVERY 2 HOUR PRN
Status: DISCONTINUED | OUTPATIENT
Start: 2022-01-01 | End: 2022-01-01

## 2022-01-01 RX ORDER — HYDROMORPHONE HYDROCHLORIDE 1 MG/ML
0.5 INJECTION, SOLUTION INTRAMUSCULAR; INTRAVENOUS; SUBCUTANEOUS EVERY 2 HOUR PRN
Status: DISCONTINUED | OUTPATIENT
Start: 2022-01-01 | End: 2022-01-01

## 2022-01-01 RX ORDER — METOCLOPRAMIDE 10 MG/1
10 TABLET ORAL EVERY 6 HOURS PRN
Status: CANCELLED | OUTPATIENT
Start: 2022-01-01

## 2022-01-01 RX ORDER — FENTANYL 12 UG/H
1 PATCH TRANSDERMAL
Status: DISCONTINUED | OUTPATIENT
Start: 2022-01-01 | End: 2022-01-01

## 2022-01-01 RX ORDER — DEXAMETHASONE SODIUM PHOSPHATE 4 MG/ML
4 VIAL (ML) INJECTION 2 TIMES DAILY
Status: CANCELLED | OUTPATIENT
Start: 2022-01-01

## 2022-01-01 RX ORDER — HYDROMORPHONE HYDROCHLORIDE 1 MG/ML
1.2 INJECTION, SOLUTION INTRAMUSCULAR; INTRAVENOUS; SUBCUTANEOUS EVERY 2 HOUR PRN
Status: DISCONTINUED | OUTPATIENT
Start: 2022-01-01 | End: 2022-01-01

## 2022-01-01 RX ORDER — ACETAMINOPHEN 650 MG/1
650 SUPPOSITORY RECTAL EVERY 4 HOURS PRN
Status: DISCONTINUED | OUTPATIENT
Start: 2022-01-01 | End: 2022-01-01

## 2022-01-01 RX ORDER — ONDANSETRON 4 MG/1
4 TABLET, FILM COATED ORAL EVERY 8 HOURS PRN
Status: DISCONTINUED | OUTPATIENT
Start: 2022-01-01 | End: 2022-01-01

## 2022-01-01 RX ORDER — FUROSEMIDE 10 MG/ML
40 INJECTION INTRAMUSCULAR; INTRAVENOUS EVERY 8 HOURS PRN
Status: DISCONTINUED | OUTPATIENT
Start: 2022-01-01 | End: 2022-01-01

## 2022-01-01 RX ORDER — CALCIUM CARBONATE 200(500)MG
1500 TABLET,CHEWABLE ORAL 2 TIMES DAILY
Status: DISCONTINUED | OUTPATIENT
Start: 2022-01-01 | End: 2022-01-01

## 2022-01-01 RX ORDER — HYDROMORPHONE HYDROCHLORIDE 1 MG/ML
INJECTION, SOLUTION INTRAMUSCULAR; INTRAVENOUS; SUBCUTANEOUS EVERY 4 HOURS PRN
Status: DISCONTINUED | OUTPATIENT
Start: 2022-01-01 | End: 2022-01-01

## 2022-01-01 RX ORDER — OXYCODONE HYDROCHLORIDE 5 MG/1
10 TABLET ORAL EVERY 4 HOURS PRN
Status: DISCONTINUED | OUTPATIENT
Start: 2022-01-01 | End: 2022-01-01

## 2022-01-01 RX ORDER — ENOXAPARIN SODIUM 100 MG/ML
1 INJECTION SUBCUTANEOUS EVERY 12 HOURS SCHEDULED
Status: DISCONTINUED | OUTPATIENT
Start: 2022-01-01 | End: 2022-01-01

## 2022-01-01 RX ORDER — METOCLOPRAMIDE HYDROCHLORIDE 5 MG/ML
10 INJECTION INTRAMUSCULAR; INTRAVENOUS EVERY 6 HOURS PRN
Status: CANCELLED | OUTPATIENT
Start: 2022-01-01

## 2022-01-01 RX ORDER — OXYCODONE HYDROCHLORIDE 15 MG/1
15 TABLET, FILM COATED, EXTENDED RELEASE ORAL EVERY 12 HOURS
Status: DISCONTINUED | OUTPATIENT
Start: 2022-01-01 | End: 2022-01-01

## 2022-01-01 RX ORDER — FAMOTIDINE 20 MG/1
20 TABLET, FILM COATED ORAL 2 TIMES DAILY
Status: DISCONTINUED | OUTPATIENT
Start: 2022-01-01 | End: 2022-01-01

## 2022-03-16 NOTE — PROGRESS NOTES
[facility] Hospitalist Progress Note                                                                   [address]  2/17/1965    SUBJECTIVE:  Pt seen and examined.   Overnight pt had bleeding from Leukocytosis, unspecified type       54 yr old female with PMH sig for rectal cancer metastatic to liver on chemo and migraines who presented to the ED c/o nausea, vomiting, and diarrhea for the past three days.      # Acute abd pain with nausea, vomiting, There are no Wet Read(s) to document.

## 2022-04-29 ENCOUNTER — APPOINTMENT (OUTPATIENT)
Dept: CT IMAGING | Facility: HOSPITAL | Age: 57
End: 2022-04-29
Attending: EMERGENCY MEDICINE
Payer: COMMERCIAL

## 2022-04-29 ENCOUNTER — HOSPITAL ENCOUNTER (EMERGENCY)
Facility: HOSPITAL | Age: 57
Discharge: HOME | End: 2022-04-29
Attending: EMERGENCY MEDICINE
Payer: COMMERCIAL

## 2022-04-29 VITALS
RESPIRATION RATE: 10 BRPM | TEMPERATURE: 97 F | SYSTOLIC BLOOD PRESSURE: 131 MMHG | BODY MASS INDEX: 20 KG/M2 | WEIGHT: 120 LBS | OXYGEN SATURATION: 95 % | HEART RATE: 96 BPM | DIASTOLIC BLOOD PRESSURE: 89 MMHG

## 2022-04-29 DIAGNOSIS — R10.13 EPIGASTRIC PAIN: Primary | ICD-10-CM

## 2022-04-29 LAB
ALBUMIN SERPL-MCNC: 3.3 G/DL (ref 3.4–5)
ALBUMIN/GLOB SERPL: 0.9 {RATIO} (ref 1–2)
ALP LIVER SERPL-CCNC: 111 U/L
ALT SERPL-CCNC: 19 U/L
ANION GAP SERPL CALC-SCNC: 14 MMOL/L (ref 0–18)
AST SERPL-CCNC: 28 U/L (ref 15–37)
BASOPHILS # BLD AUTO: 0.02 X10(3) UL (ref 0–0.2)
BASOPHILS NFR BLD AUTO: 0.3 %
BILIRUB SERPL-MCNC: 0.8 MG/DL (ref 0.1–2)
BUN BLD-MCNC: 8 MG/DL (ref 7–18)
CALCIUM BLD-MCNC: 9.5 MG/DL (ref 8.5–10.1)
CHLORIDE SERPL-SCNC: 92 MMOL/L (ref 98–112)
CO2 SERPL-SCNC: 24 MMOL/L (ref 21–32)
CREAT BLD-MCNC: 1.03 MG/DL
EOSINOPHIL # BLD AUTO: 0 X10(3) UL (ref 0–0.7)
EOSINOPHIL NFR BLD AUTO: 0 %
ERYTHROCYTE [DISTWIDTH] IN BLOOD BY AUTOMATED COUNT: 13.1 %
GLOBULIN PLAS-MCNC: 3.8 G/DL (ref 2.8–4.4)
GLUCOSE BLD-MCNC: 148 MG/DL (ref 70–99)
HCT VFR BLD AUTO: 42 %
HGB BLD-MCNC: 13.8 G/DL
IMM GRANULOCYTES # BLD AUTO: 0.02 X10(3) UL (ref 0–1)
IMM GRANULOCYTES NFR BLD: 0.3 %
LACTATE SERPL-SCNC: 1.1 MMOL/L (ref 0.4–2)
LIPASE SERPL-CCNC: 93 U/L (ref 73–393)
LYMPHOCYTES # BLD AUTO: 0.44 X10(3) UL (ref 1–4)
LYMPHOCYTES NFR BLD AUTO: 5.9 %
MCH RBC QN AUTO: 27.8 PG (ref 26–34)
MCHC RBC AUTO-ENTMCNC: 32.9 G/DL (ref 31–37)
MCV RBC AUTO: 84.7 FL
MONOCYTES # BLD AUTO: 0.17 X10(3) UL (ref 0.1–1)
MONOCYTES NFR BLD AUTO: 2.3 %
NEUTROPHILS # BLD AUTO: 6.77 X10 (3) UL (ref 1.5–7.7)
NEUTROPHILS # BLD AUTO: 6.77 X10(3) UL (ref 1.5–7.7)
NEUTROPHILS NFR BLD AUTO: 91.2 %
OSMOLALITY SERPL CALC.SUM OF ELEC: 271 MOSM/KG (ref 275–295)
PLATELET # BLD AUTO: 201 10(3)UL (ref 150–450)
POTASSIUM SERPL-SCNC: 3.8 MMOL/L (ref 3.5–5.1)
PROCALCITONIN SERPL-MCNC: <0.05 NG/ML (ref ?–0.16)
PROCALCITONIN SERPL-MCNC: <0.05 NG/ML (ref ?–0.16)
PROT SERPL-MCNC: 7.1 G/DL (ref 6.4–8.2)
RBC # BLD AUTO: 4.96 X10(6)UL
SARS-COV-2 RNA RESP QL NAA+PROBE: NOT DETECTED
SODIUM SERPL-SCNC: 130 MMOL/L (ref 136–145)
WBC # BLD AUTO: 7.4 X10(3) UL (ref 4–11)

## 2022-04-29 PROCEDURE — 84145 PROCALCITONIN (PCT): CPT | Performed by: EMERGENCY MEDICINE

## 2022-04-29 PROCEDURE — 80053 COMPREHEN METABOLIC PANEL: CPT | Performed by: EMERGENCY MEDICINE

## 2022-04-29 PROCEDURE — 85025 COMPLETE CBC W/AUTO DIFF WBC: CPT | Performed by: EMERGENCY MEDICINE

## 2022-04-29 PROCEDURE — 83690 ASSAY OF LIPASE: CPT | Performed by: EMERGENCY MEDICINE

## 2022-04-29 PROCEDURE — 74177 CT ABD & PELVIS W/CONTRAST: CPT | Performed by: EMERGENCY MEDICINE

## 2022-04-29 PROCEDURE — 96374 THER/PROPH/DIAG INJ IV PUSH: CPT

## 2022-04-29 PROCEDURE — 36415 COLL VENOUS BLD VENIPUNCTURE: CPT

## 2022-04-29 PROCEDURE — 83605 ASSAY OF LACTIC ACID: CPT | Performed by: EMERGENCY MEDICINE

## 2022-04-29 PROCEDURE — 96375 TX/PRO/DX INJ NEW DRUG ADDON: CPT

## 2022-04-29 PROCEDURE — 87040 BLOOD CULTURE FOR BACTERIA: CPT | Performed by: EMERGENCY MEDICINE

## 2022-04-29 PROCEDURE — 96361 HYDRATE IV INFUSION ADD-ON: CPT

## 2022-04-29 PROCEDURE — 99284 EMERGENCY DEPT VISIT MOD MDM: CPT

## 2022-04-29 PROCEDURE — 99285 EMERGENCY DEPT VISIT HI MDM: CPT

## 2022-04-29 RX ORDER — SUCRALFATE 1 G/1
1 TABLET ORAL
COMMUNITY

## 2022-04-29 RX ORDER — ONDANSETRON 2 MG/ML
4 INJECTION INTRAMUSCULAR; INTRAVENOUS ONCE
Status: COMPLETED | OUTPATIENT
Start: 2022-04-29 | End: 2022-04-29

## 2022-04-29 RX ORDER — IOHEXOL 350 MG/ML
100 INJECTION, SOLUTION INTRAVENOUS
Status: COMPLETED | OUTPATIENT
Start: 2022-04-29 | End: 2022-04-29

## 2022-04-29 RX ORDER — ONDANSETRON 4 MG/1
4 TABLET, ORALLY DISINTEGRATING ORAL EVERY 4 HOURS PRN
Qty: 10 TABLET | Refills: 0 | Status: SHIPPED | OUTPATIENT
Start: 2022-04-29 | End: 2022-05-06

## 2022-04-29 RX ORDER — OXYCODONE HYDROCHLORIDE AND ACETAMINOPHEN 5; 325 MG/1; MG/1
1 TABLET ORAL EVERY 6 HOURS PRN
Qty: 10 TABLET | Refills: 0 | Status: SHIPPED | OUTPATIENT
Start: 2022-04-29 | End: 2022-05-09

## 2022-04-29 RX ORDER — SODIUM CHLORIDE 9 MG/ML
125 INJECTION, SOLUTION INTRAVENOUS CONTINUOUS
Status: DISCONTINUED | OUTPATIENT
Start: 2022-04-29 | End: 2022-04-29

## 2022-04-29 RX ORDER — HYDROMORPHONE HYDROCHLORIDE 1 MG/ML
0.5 INJECTION, SOLUTION INTRAMUSCULAR; INTRAVENOUS; SUBCUTANEOUS EVERY 30 MIN PRN
Status: DISCONTINUED | OUTPATIENT
Start: 2022-04-29 | End: 2022-04-29

## 2022-04-29 NOTE — ED INITIAL ASSESSMENT (HPI)
Epigastric pain that started yesterday at 2000. Patient has recent abdominal surgery to remove cancer lymph nodes.

## 2022-05-04 ENCOUNTER — APPOINTMENT (OUTPATIENT)
Dept: CT IMAGING | Facility: HOSPITAL | Age: 57
End: 2022-05-04
Attending: EMERGENCY MEDICINE
Payer: COMMERCIAL

## 2022-05-04 ENCOUNTER — HOSPITAL ENCOUNTER (INPATIENT)
Facility: HOSPITAL | Age: 57
LOS: 3 days | Discharge: HOME HEALTH | End: 2022-05-11
Attending: EMERGENCY MEDICINE | Admitting: INTERNAL MEDICINE
Payer: COMMERCIAL

## 2022-05-04 DIAGNOSIS — K29.00 ACUTE GASTRITIS WITHOUT HEMORRHAGE, UNSPECIFIED GASTRITIS TYPE: ICD-10-CM

## 2022-05-04 DIAGNOSIS — R11.10 INTRACTABLE VOMITING: Primary | ICD-10-CM

## 2022-05-04 DIAGNOSIS — C18.9 MALIGNANT NEOPLASM OF COLON, UNSPECIFIED PART OF COLON (HCC): ICD-10-CM

## 2022-05-04 DIAGNOSIS — R18.0 ASCITES, MALIGNANT: ICD-10-CM

## 2022-05-04 DIAGNOSIS — E87.1 HYPONATREMIA: ICD-10-CM

## 2022-05-04 LAB
ALBUMIN SERPL-MCNC: 3.3 G/DL (ref 3.4–5)
ALBUMIN/GLOB SERPL: 0.9 {RATIO} (ref 1–2)
ALP LIVER SERPL-CCNC: 103 U/L
ALT SERPL-CCNC: 18 U/L
ANION GAP SERPL CALC-SCNC: 9 MMOL/L (ref 0–18)
APTT PPP: 31.7 SECONDS (ref 23.3–35.6)
AST SERPL-CCNC: 23 U/L (ref 15–37)
BASOPHILS # BLD AUTO: 0.02 X10(3) UL (ref 0–0.2)
BASOPHILS NFR BLD AUTO: 0.2 %
BILIRUB SERPL-MCNC: 0.7 MG/DL (ref 0.1–2)
BUN BLD-MCNC: 9 MG/DL (ref 7–18)
CALCIUM BLD-MCNC: 9.6 MG/DL (ref 8.5–10.1)
CHLORIDE SERPL-SCNC: 93 MMOL/L (ref 98–112)
CO2 SERPL-SCNC: 27 MMOL/L (ref 21–32)
CREAT BLD-MCNC: 0.91 MG/DL
EOSINOPHIL # BLD AUTO: 0 X10(3) UL (ref 0–0.7)
EOSINOPHIL NFR BLD AUTO: 0 %
ERYTHROCYTE [DISTWIDTH] IN BLOOD BY AUTOMATED COUNT: 13 %
GLOBULIN PLAS-MCNC: 3.6 G/DL (ref 2.8–4.4)
GLUCOSE BLD-MCNC: 114 MG/DL (ref 70–99)
HCT VFR BLD AUTO: 39.6 %
HGB BLD-MCNC: 12.8 G/DL
IMM GRANULOCYTES # BLD AUTO: 0.03 X10(3) UL (ref 0–1)
IMM GRANULOCYTES NFR BLD: 0.4 %
INR BLD: 1.01 (ref 0.8–1.2)
LYMPHOCYTES # BLD AUTO: 0.6 X10(3) UL (ref 1–4)
LYMPHOCYTES NFR BLD AUTO: 7.1 %
MCH RBC QN AUTO: 27.2 PG (ref 26–34)
MCHC RBC AUTO-ENTMCNC: 32.3 G/DL (ref 31–37)
MCV RBC AUTO: 84.1 FL
MONOCYTES # BLD AUTO: 0.42 X10(3) UL (ref 0.1–1)
MONOCYTES NFR BLD AUTO: 5 %
NEUTROPHILS # BLD AUTO: 7.41 X10 (3) UL (ref 1.5–7.7)
NEUTROPHILS # BLD AUTO: 7.41 X10(3) UL (ref 1.5–7.7)
NEUTROPHILS NFR BLD AUTO: 87.3 %
OSMOLALITY SERPL CALC.SUM OF ELEC: 268 MOSM/KG (ref 275–295)
PLATELET # BLD AUTO: 244 10(3)UL (ref 150–450)
POTASSIUM SERPL-SCNC: 4 MMOL/L (ref 3.5–5.1)
PROT SERPL-MCNC: 6.9 G/DL (ref 6.4–8.2)
PROTHROMBIN TIME: 13.3 SECONDS (ref 11.6–14.8)
RBC # BLD AUTO: 4.71 X10(6)UL
SARS-COV-2 RNA RESP QL NAA+PROBE: NOT DETECTED
SODIUM SERPL-SCNC: 129 MMOL/L (ref 136–145)
TSI SER-ACNC: 1.94 MIU/ML (ref 0.36–3.74)
WBC # BLD AUTO: 8.5 X10(3) UL (ref 4–11)

## 2022-05-04 PROCEDURE — 80307 DRUG TEST PRSMV CHEM ANLYZR: CPT | Performed by: EMERGENCY MEDICINE

## 2022-05-04 PROCEDURE — 85025 COMPLETE CBC W/AUTO DIFF WBC: CPT | Performed by: EMERGENCY MEDICINE

## 2022-05-04 PROCEDURE — C9113 INJ PANTOPRAZOLE SODIUM, VIA: HCPCS | Performed by: EMERGENCY MEDICINE

## 2022-05-04 PROCEDURE — 85730 THROMBOPLASTIN TIME PARTIAL: CPT | Performed by: EMERGENCY MEDICINE

## 2022-05-04 PROCEDURE — 74176 CT ABD & PELVIS W/O CONTRAST: CPT | Performed by: EMERGENCY MEDICINE

## 2022-05-04 PROCEDURE — 99285 EMERGENCY DEPT VISIT HI MDM: CPT

## 2022-05-04 PROCEDURE — 96375 TX/PRO/DX INJ NEW DRUG ADDON: CPT

## 2022-05-04 PROCEDURE — 80053 COMPREHEN METABOLIC PANEL: CPT | Performed by: EMERGENCY MEDICINE

## 2022-05-04 PROCEDURE — 84443 ASSAY THYROID STIM HORMONE: CPT | Performed by: EMERGENCY MEDICINE

## 2022-05-04 PROCEDURE — 85610 PROTHROMBIN TIME: CPT | Performed by: EMERGENCY MEDICINE

## 2022-05-04 PROCEDURE — 96374 THER/PROPH/DIAG INJ IV PUSH: CPT

## 2022-05-04 RX ORDER — HEPARIN SODIUM 5000 [USP'U]/ML
5000 INJECTION, SOLUTION INTRAVENOUS; SUBCUTANEOUS EVERY 8 HOURS SCHEDULED
Status: DISCONTINUED | OUTPATIENT
Start: 2022-05-04 | End: 2022-05-08

## 2022-05-04 RX ORDER — HYDROMORPHONE HYDROCHLORIDE 1 MG/ML
0.4 INJECTION, SOLUTION INTRAMUSCULAR; INTRAVENOUS; SUBCUTANEOUS EVERY 2 HOUR PRN
Status: DISCONTINUED | OUTPATIENT
Start: 2022-05-04 | End: 2022-05-10

## 2022-05-04 RX ORDER — HYDROMORPHONE HYDROCHLORIDE 1 MG/ML
0.2 INJECTION, SOLUTION INTRAMUSCULAR; INTRAVENOUS; SUBCUTANEOUS EVERY 2 HOUR PRN
Status: DISCONTINUED | OUTPATIENT
Start: 2022-05-04 | End: 2022-05-10

## 2022-05-04 RX ORDER — HYDROMORPHONE HYDROCHLORIDE 1 MG/ML
0.5 INJECTION, SOLUTION INTRAMUSCULAR; INTRAVENOUS; SUBCUTANEOUS EVERY 30 MIN PRN
Status: DISCONTINUED | OUTPATIENT
Start: 2022-05-04 | End: 2022-05-04

## 2022-05-04 RX ORDER — ONDANSETRON 2 MG/ML
4 INJECTION INTRAMUSCULAR; INTRAVENOUS EVERY 6 HOURS PRN
Status: DISCONTINUED | OUTPATIENT
Start: 2022-05-04 | End: 2022-05-11

## 2022-05-04 RX ORDER — ONDANSETRON 2 MG/ML
4 INJECTION INTRAMUSCULAR; INTRAVENOUS ONCE
Status: COMPLETED | OUTPATIENT
Start: 2022-05-04 | End: 2022-05-04

## 2022-05-04 RX ORDER — HYDROMORPHONE HYDROCHLORIDE 1 MG/ML
1 INJECTION, SOLUTION INTRAMUSCULAR; INTRAVENOUS; SUBCUTANEOUS ONCE
Status: COMPLETED | OUTPATIENT
Start: 2022-05-04 | End: 2022-05-04

## 2022-05-04 RX ORDER — ONDANSETRON 2 MG/ML
4 INJECTION INTRAMUSCULAR; INTRAVENOUS EVERY 4 HOURS PRN
Status: DISCONTINUED | OUTPATIENT
Start: 2022-05-04 | End: 2022-05-04

## 2022-05-04 RX ORDER — LORAZEPAM 2 MG/ML
1 INJECTION INTRAMUSCULAR ONCE
Status: COMPLETED | OUTPATIENT
Start: 2022-05-04 | End: 2022-05-04

## 2022-05-04 RX ORDER — SODIUM CHLORIDE 9 MG/ML
INJECTION, SOLUTION INTRAVENOUS CONTINUOUS
Status: DISCONTINUED | OUTPATIENT
Start: 2022-05-04 | End: 2022-05-07

## 2022-05-04 RX ORDER — HYDROMORPHONE HYDROCHLORIDE 1 MG/ML
0.8 INJECTION, SOLUTION INTRAMUSCULAR; INTRAVENOUS; SUBCUTANEOUS EVERY 2 HOUR PRN
Status: DISCONTINUED | OUTPATIENT
Start: 2022-05-04 | End: 2022-05-10

## 2022-05-04 RX ORDER — SODIUM CHLORIDE 9 MG/ML
INJECTION, SOLUTION INTRAVENOUS CONTINUOUS
Status: ACTIVE | OUTPATIENT
Start: 2022-05-04 | End: 2022-05-04

## 2022-05-04 NOTE — ED INITIAL ASSESSMENT (HPI)
Abdominal pain and distension with vomiting since yesterday .  Patient is scheduled for paracentesis tomorrow she cannot wait  Because of pain her doctor advised to go to ER

## 2022-05-04 NOTE — ED QUICK NOTES
Orders for admission, patient is aware of plan and ready to go upstairs. Any questions, please call ED RN Marybeth Mullen  at extension [phone]. Vaccinated? Yes  Type of COVID test sent:Rapid  COVID Suspicion level: Low      Titratable drug(s) infusing:N/A  Rate:    LOC at time of transport:Alert and oriented    Other pertinent information:    CIWA score=N/A  NIH score=N/A

## 2022-05-05 ENCOUNTER — APPOINTMENT (OUTPATIENT)
Dept: ULTRASOUND IMAGING | Facility: HOSPITAL | Age: 57
End: 2022-05-05
Attending: INTERNAL MEDICINE
Payer: COMMERCIAL

## 2022-05-05 LAB
ANION GAP SERPL CALC-SCNC: 7 MMOL/L (ref 0–18)
BASOPHILS # BLD AUTO: 0.01 X10(3) UL (ref 0–0.2)
BASOPHILS NFR BLD AUTO: 0.2 %
BASOPHILS NFR PRT: 0 %
BUN BLD-MCNC: 7 MG/DL (ref 7–18)
CALCIUM BLD-MCNC: 8.5 MG/DL (ref 8.5–10.1)
CHLORIDE SERPL-SCNC: 102 MMOL/L (ref 98–112)
CO2 SERPL-SCNC: 25 MMOL/L (ref 21–32)
COLOR FLD: YELLOW
CREAT BLD-MCNC: 0.75 MG/DL
EOSINOPHIL # BLD AUTO: 0.03 X10(3) UL (ref 0–0.7)
EOSINOPHIL NFR BLD AUTO: 0.6 %
EOSINOPHIL NFR PRT: 0 %
ERYTHROCYTE [DISTWIDTH] IN BLOOD BY AUTOMATED COUNT: 13.2 %
GLUCOSE BLD-MCNC: 90 MG/DL (ref 70–99)
HCT VFR BLD AUTO: 31.1 %
HGB BLD-MCNC: 10.4 G/DL
IMM GRANULOCYTES # BLD AUTO: 0.02 X10(3) UL (ref 0–1)
IMM GRANULOCYTES NFR BLD: 0.4 %
LYMPHOCYTES # BLD AUTO: 0.91 X10(3) UL (ref 1–4)
LYMPHOCYTES NFR BLD AUTO: 19.6 %
LYMPHOCYTES NFR PRT: 64 %
MCH RBC QN AUTO: 28.1 PG (ref 26–34)
MCHC RBC AUTO-ENTMCNC: 33.4 G/DL (ref 31–37)
MCV RBC AUTO: 84.1 FL
MESOTHL CELL NFR PRT: 2 %
MONOCYTES # BLD AUTO: 0.39 X10(3) UL (ref 0.1–1)
MONOCYTES NFR BLD AUTO: 8.4 %
MONOS+MACROS NFR PRT: 33 %
NEUTROPHILS # BLD AUTO: 3.28 X10 (3) UL (ref 1.5–7.7)
NEUTROPHILS # BLD AUTO: 3.28 X10(3) UL (ref 1.5–7.7)
NEUTROPHILS NFR BLD AUTO: 70.8 %
NEUTROPHILS PERITONEAL FLUID: 2 %
OSMOLALITY SERPL CALC.SUM OF ELEC: 276 MOSM/KG (ref 275–295)
PLATELET # BLD AUTO: 197 10(3)UL (ref 150–450)
POTASSIUM SERPL-SCNC: 4 MMOL/L (ref 3.5–5.1)
RBC # BLD AUTO: 3.7 X10(6)UL
RBC PERITONEAL FLUID: <3000 /MM3
SODIUM SERPL-SCNC: 134 MMOL/L (ref 136–145)
TOTAL CELLS COUNTED FLD: 55
WBC # BLD AUTO: 4.6 X10(3) UL (ref 4–11)
WBC PERITONEAL FLUID: 102 /MM3

## 2022-05-05 PROCEDURE — 89051 BODY FLUID CELL COUNT: CPT | Performed by: INTERNAL MEDICINE

## 2022-05-05 PROCEDURE — 85025 COMPLETE CBC W/AUTO DIFF WBC: CPT | Performed by: INTERNAL MEDICINE

## 2022-05-05 PROCEDURE — 89050 BODY FLUID CELL COUNT: CPT | Performed by: INTERNAL MEDICINE

## 2022-05-05 PROCEDURE — 49083 ABD PARACENTESIS W/IMAGING: CPT | Performed by: INTERNAL MEDICINE

## 2022-05-05 PROCEDURE — 80048 BASIC METABOLIC PNL TOTAL CA: CPT | Performed by: INTERNAL MEDICINE

## 2022-05-05 PROCEDURE — 0W9G3ZZ DRAINAGE OF PERITONEAL CAVITY, PERCUTANEOUS APPROACH: ICD-10-PCS | Performed by: RADIOLOGY

## 2022-05-05 RX ORDER — MULTIPLE VITAMINS W/ MINERALS TAB 9MG-400MCG
1 TAB ORAL DAILY
Status: DISCONTINUED | OUTPATIENT
Start: 2022-05-05 | End: 2022-05-11

## 2022-05-05 NOTE — PLAN OF CARE
NURSING ADMISSION NOTE      Patient admitted via cart from ED. Oriented to room. Safety precautions initiated. Bed in low position. Call light in reach. Pt. A&O x4. VSS. Afebrile. Admission navigator completed. Pt. Does not c/o N/V or abdominal pain upon arrival to the unit.  at the bedside. Updated on POC. Will continue to monitor.

## 2022-05-05 NOTE — PLAN OF CARE
Pt. A&O x4. VSS. Afebrile. Pt. C/o abdominal pain; PRN dilaudid given per [facility] with relief. Pt. C/o nausea; PRN zofran given per MAR. US paracentesis completed today; 3.5L removed. Dressings clean/dry/intact. Pt. Tolerating diet after procedure. Pt. To be NPO at midnight for EGD tomorrow.  at the bedside. Call light within reach. Will continue to monitor.       Problem: GASTROINTESTINAL - ADULT  Goal: Minimal or absence of nausea and vomiting  Description: INTERVENTIONS:  - Maintain adequate hydration with IV or PO as ordered and tolerated  - Nasogastric tube to low intermittent suction as ordered  - Evaluate effectiveness of ordered antiemetic medications  - Provide nonpharmacologic comfort measures as appropriate  - Advance diet as tolerated, if ordered  - Obtain nutritional consult as needed  - Evaluate fluid balance  Outcome: Progressing     Problem: GASTROINTESTINAL - ADULT  Goal: Maintains or returns to baseline bowel function  Description: INTERVENTIONS:  - Assess bowel function  - Maintain adequate hydration with IV or PO as ordered and tolerated  - Evaluate effectiveness of GI medications  - Encourage mobilization and activity  - Obtain nutritional consult as needed  - Establish a toileting routine/schedule  - Consider collaborating with pharmacy to review patient's medication profile  Outcome: Progressing

## 2022-05-05 NOTE — CM/SW NOTE
CM and RN discussed patient's DC needs in rounds. Patient states that she is current with [address] [place] [address]. DINORAH entered. Plan of Care/Barriers to discharge:  Paracentesis today. Wound dehisc, ascites    DC Plan:  [address]     &  to remain available and supportive for discharge planning needs.     Logan Louis RN Case Manager [phone]

## 2022-05-05 NOTE — PLAN OF CARE
Patient alert and oriented x4. VSS. Dilaudid given for abdominal pain. Room air. Heparin on hold for paracentesis tomorrow. Plan for NPO 4 hours before procedure. 0.9 at 100 running. No further needs at this time.         Problem: GASTROINTESTINAL - ADULT  Goal: Minimal or absence of nausea and vomiting  Description: INTERVENTIONS:  - Maintain adequate hydration with IV or PO as ordered and tolerated  - Nasogastric tube to low intermittent suction as ordered  - Evaluate effectiveness of ordered antiemetic medications  - Provide nonpharmacologic comfort measures as appropriate  - Advance diet as tolerated, if ordered  - Obtain nutritional consult as needed  - Evaluate fluid balance  Outcome: Progressing  Goal: Maintains or returns to baseline bowel function  Description: INTERVENTIONS:  - Assess bowel function  - Maintain adequate hydration with IV or PO as ordered and tolerated  - Evaluate effectiveness of GI medications  - Encourage mobilization and activity  - Obtain nutritional consult as needed  - Establish a toileting routine/schedule  - Consider collaborating with pharmacy to review patient's medication profile  Outcome: Progressing

## 2022-05-06 ENCOUNTER — ANESTHESIA EVENT (OUTPATIENT)
Dept: ENDOSCOPY | Facility: HOSPITAL | Age: 57
End: 2022-05-06
Payer: COMMERCIAL

## 2022-05-06 ENCOUNTER — ANESTHESIA (OUTPATIENT)
Dept: ENDOSCOPY | Facility: HOSPITAL | Age: 57
End: 2022-05-06
Payer: COMMERCIAL

## 2022-05-06 LAB
ALBUMIN SERPL-MCNC: 2.3 G/DL (ref 3.4–5)
ALBUMIN/GLOB SERPL: 0.8 {RATIO} (ref 1–2)
ALP LIVER SERPL-CCNC: 78 U/L
ALT SERPL-CCNC: 14 U/L
AMPHETAMINES, S/P, SCREEN: NEGATIVE NG/ML
ANION GAP SERPL CALC-SCNC: 9 MMOL/L (ref 0–18)
AST SERPL-CCNC: 20 U/L (ref 15–37)
BARBITURATES, S/P, SCREEN: NEGATIVE NG/ML
BASOPHILS # BLD AUTO: 0.03 X10(3) UL (ref 0–0.2)
BASOPHILS NFR BLD AUTO: 0.6 %
BENZODIAZEPINES, S/P, SCREEN: NEGATIVE NG/ML
BILIRUB SERPL-MCNC: 0.3 MG/DL (ref 0.1–2)
BUN BLD-MCNC: 7 MG/DL (ref 7–18)
BUPRENORPHINE, S/P, SCREEN: NEGATIVE NG/ML
CALCIUM BLD-MCNC: 8.4 MG/DL (ref 8.5–10.1)
CANNABINOIDS, S/P, SCREEN: NEGATIVE NG/ML
CHLORIDE SERPL-SCNC: 107 MMOL/L (ref 98–112)
CO2 SERPL-SCNC: 21 MMOL/L (ref 21–32)
COCAINE, S/P, SCREEN: NEGATIVE NG/ML
CREAT BLD-MCNC: 0.74 MG/DL
EOSINOPHIL # BLD AUTO: 0.08 X10(3) UL (ref 0–0.7)
EOSINOPHIL NFR BLD AUTO: 1.6 %
ERYTHROCYTE [DISTWIDTH] IN BLOOD BY AUTOMATED COUNT: 13.2 %
GLOBULIN PLAS-MCNC: 2.8 G/DL (ref 2.8–4.4)
GLUCOSE BLD-MCNC: 94 MG/DL (ref 70–99)
HCT VFR BLD AUTO: 35 %
HGB BLD-MCNC: 11.1 G/DL
IMM GRANULOCYTES # BLD AUTO: 0.03 X10(3) UL (ref 0–1)
IMM GRANULOCYTES NFR BLD: 0.6 %
LYMPHOCYTES # BLD AUTO: 0.77 X10(3) UL (ref 1–4)
LYMPHOCYTES NFR BLD AUTO: 15.7 %
MCH RBC QN AUTO: 27.1 PG (ref 26–34)
MCHC RBC AUTO-ENTMCNC: 31.7 G/DL (ref 31–37)
MCV RBC AUTO: 85.4 FL
METHADONE, S/P, SCREEN: NEGATIVE NG/ML
METHAMPHETAMINE, S/P, SCREEN: NEGATIVE NG/ML
MONOCYTES # BLD AUTO: 0.4 X10(3) UL (ref 0.1–1)
MONOCYTES NFR BLD AUTO: 8.1 %
NEUTROPHILS # BLD AUTO: 3.6 X10 (3) UL (ref 1.5–7.7)
NEUTROPHILS # BLD AUTO: 3.6 X10(3) UL (ref 1.5–7.7)
NEUTROPHILS NFR BLD AUTO: 73.4 %
OPIATES, S/P, SCREEN: NEGATIVE NG/ML
OSMOLALITY SERPL CALC.SUM OF ELEC: 282 MOSM/KG (ref 275–295)
OXYCODONE, S/P, SCREEN: NEGATIVE NG/ML
PHENCYCLIDINE, S/P, SCREEN: NEGATIVE NG/ML
PLATELET # BLD AUTO: 195 10(3)UL (ref 150–450)
POTASSIUM SERPL-SCNC: 3.8 MMOL/L (ref 3.5–5.1)
PROT SERPL-MCNC: 5.1 G/DL (ref 6.4–8.2)
RBC # BLD AUTO: 4.1 X10(6)UL
SODIUM SERPL-SCNC: 137 MMOL/L (ref 136–145)
WBC # BLD AUTO: 4.9 X10(3) UL (ref 4–11)

## 2022-05-06 PROCEDURE — 88305 TISSUE EXAM BY PATHOLOGIST: CPT | Performed by: INTERNAL MEDICINE

## 2022-05-06 PROCEDURE — 0DB68ZX EXCISION OF STOMACH, VIA NATURAL OR ARTIFICIAL OPENING ENDOSCOPIC, DIAGNOSTIC: ICD-10-PCS | Performed by: INTERNAL MEDICINE

## 2022-05-06 PROCEDURE — 99211 OFF/OP EST MAY X REQ PHY/QHP: CPT

## 2022-05-06 PROCEDURE — 80053 COMPREHEN METABOLIC PANEL: CPT | Performed by: INTERNAL MEDICINE

## 2022-05-06 PROCEDURE — 85025 COMPLETE CBC W/AUTO DIFF WBC: CPT | Performed by: INTERNAL MEDICINE

## 2022-05-06 RX ORDER — NALOXONE HYDROCHLORIDE 0.4 MG/ML
80 INJECTION, SOLUTION INTRAMUSCULAR; INTRAVENOUS; SUBCUTANEOUS AS NEEDED
Status: ACTIVE | OUTPATIENT
Start: 2022-05-06 | End: 2022-05-07

## 2022-05-06 RX ORDER — PANTOPRAZOLE SODIUM 40 MG/1
40 TABLET, DELAYED RELEASE ORAL
Status: DISCONTINUED | OUTPATIENT
Start: 2022-05-07 | End: 2022-05-07

## 2022-05-06 RX ORDER — SODIUM CHLORIDE, SODIUM LACTATE, POTASSIUM CHLORIDE, CALCIUM CHLORIDE 600; 310; 30; 20 MG/100ML; MG/100ML; MG/100ML; MG/100ML
INJECTION, SOLUTION INTRAVENOUS CONTINUOUS
Status: DISCONTINUED | OUTPATIENT
Start: 2022-05-06 | End: 2022-05-06

## 2022-05-06 RX ORDER — LIDOCAINE HYDROCHLORIDE 10 MG/ML
INJECTION, SOLUTION EPIDURAL; INFILTRATION; INTRACAUDAL; PERINEURAL AS NEEDED
Status: DISCONTINUED | OUTPATIENT
Start: 2022-05-06 | End: 2022-05-06 | Stop reason: SURG

## 2022-05-06 RX ADMIN — LIDOCAINE HYDROCHLORIDE 100 MG: 10 INJECTION, SOLUTION EPIDURAL; INFILTRATION; INTRACAUDAL; PERINEURAL at 14:59:00

## 2022-05-06 NOTE — PROGRESS NOTES
Alert and orientated x4. Medicated with dilaudid for abdominal pain twice. Abdominal dressings dry and intact. Abdomen soft, rounded. Npo for possible egd today. Iv infusing at 100cc/hr. Voiding.   Up in room with standby assist.

## 2022-05-06 NOTE — OPERATIVE REPORT
[facility] Patient Status:  Observation    1965 MRN [id number]   Location [address] [address] Attending Liliana Matute, [address] Day # 0 PCP Alina Mays MD         PATIENT NAME: Melvin Barahona  DATE OF OPERATION: 2022    PREOPERATIVE DIAGNOSIS:  1. Abd pain  2. Abn stomach CT  POSTOPERATIVE DIAGNOSIS:  1. Gastritis, mild. Biopsies taken    PROCEDURE PERFORMED: Upper endoscopy with MAC sedation  SURGEON: Sejal Soares MD   MEDICATIONS: MAC IV in divided doses under the supervision of Anesthesia. PROCEDURE AND FINDINGS: The patient was placed into the left lateral decubitus position after informed consent was obtained. All questions were answered. MAC IV sedation was administered. The Olympus video gastroscope was introduced into the mouth and passed to the third portion of the duodenum. The entire examined esophagus was normal.  There was mild gastritis. Biopsies were taken with a cold forceps. The remainder of the entire examined stomach,  including retroflexion view, was normal.  The entire examined duodenum was normal.   The gastroscope was removed from the patient. The procedure was completed. There were no implants placed nor significant blood loss. The patient tolerated the procedure well. There were no immediate post procedure complications. Anesthesia was present to assist in monitoring the patient during the entire length of the moderate sedation time. RECOMMENDATIONS:  Follow up biopsies. Continue PPI therapy. Regular diet    Karl Soares MD

## 2022-05-06 NOTE — PLAN OF CARE
Patient A+Ox4. C/o abdominal pain PRN dilaudid given x1. Up with standby assist. NPO for EGD today, consent signed and in chart. IVF per [facility]. Had paracentesis yesterday. Dressing to right para site leaking , changed several times. Moderate amount of drainage, finally ostomy bag placed over to catch drainage. Problem: GASTROINTESTINAL - ADULT  Goal: Minimal or absence of nausea and vomiting  Description: INTERVENTIONS:  - Maintain adequate hydration with IV or PO as ordered and tolerated  - Nasogastric tube to low intermittent suction as ordered  - Evaluate effectiveness of ordered antiemetic medications  - Provide nonpharmacologic comfort measures as appropriate  - Advance diet as tolerated, if ordered  - Obtain nutritional consult as needed  - Evaluate fluid balance  Outcome: Progressing     Problem: PAIN - ADULT  Goal: Verbalizes/displays adequate comfort level or patient's stated pain goal  Description: INTERVENTIONS:  - Encourage pt to monitor pain and request assistance  - Assess pain using appropriate pain scale  - Administer analgesics based on type and severity of pain and evaluate response  - Implement non-pharmacological measures as appropriate and evaluate response  - Consider cultural and social influences on pain and pain management  - Manage/alleviate anxiety  - Utilize distraction and/or relaxation techniques  - Monitor for opioid side effects  - Notify MD/LIP if interventions unsuccessful or patient reports new pain  - Anticipate increased pain with activity and pre-medicate as appropriate  Outcome: Progressing     Problem: SAFETY ADULT - FALL  Goal: Free from fall injury  Description: INTERVENTIONS:  - Assess pt frequently for physical needs  - Identify cognitive and physical deficits and behaviors that affect risk of falls.   - [place] fall precautions as indicated by assessment.  - Educate pt/family on patient safety including physical limitations  - Instruct pt to call for assistance with activity based on assessment  - Modify environment to reduce risk of injury  - Provide assistive devices as appropriate  - Consider OT/PT consult to assist with strengthening/mobility  - Encourage toileting schedule  Outcome: Progressing

## 2022-05-06 NOTE — CM/SW NOTE
Patient is current with [facility] services which was not able to be sent via [employer]. MSW faxed resume orders to [phone].     Sheela Asher LCSW

## 2022-05-06 NOTE — ANESTHESIA POSTPROCEDURE EVALUATION
Jessenia Patient Status:  Observation   Age/Gender 62year old female MRN [id number]   Location [address] [facility] 28 Attending Arnol Hewitt, [address] [facility] Day # 0 PCP Dominic Servin MD       Anesthesia Post-op Note    ESOPHAGOGASTRODUODENOSCOPY (EGD) with BIOPSY    Procedure Summary     Date: 05/06/22 Room / Location: [facility] ENDOSCOPY 02 / [facility] ENDOSCOPY    Anesthesia Start: 2366 Anesthesia Stop:     Procedure: ESOPHAGOGASTRODUODENOSCOPY (EGD) with BIOPSY (N/A ) Diagnosis: (MILD GASTRITIS)    Surgeons: Andrade Lu MD Anesthesiologist: Satinder Presley MD    Anesthesia Type: MAC ASA Status: 3          Anesthesia Type: No value filed. Vitals Value Taken Time   BP 98/63 05/06/22 1505   Temp  05/06/22 1505   Pulse 83 05/06/22 1505   Resp 18 05/06/22 1505   SpO2 96 05/06/22 1505       Patient Location: Endoscopy    Anesthesia Type: MAC    Airway Patency: patent    Postop Pain Control: adequate    Mental Status: mildly sedated but able to meaningfully participate in the post-anesthesia evaluation    Nausea/Vomiting: none    Cardiopulmonary/Hydration status: stable euvolemic    Complications: no apparent anesthesia related complications    Postop vital signs: stable    Dental Exam: Unchanged from Preop    Patient to be discharged from PACU when criteria met.

## 2022-05-07 RX ORDER — PANTOPRAZOLE SODIUM 40 MG/1
40 TABLET, DELAYED RELEASE ORAL
Status: DISCONTINUED | OUTPATIENT
Start: 2022-05-07 | End: 2022-05-11

## 2022-05-07 NOTE — PROGRESS NOTES
Continues to have clear serous drainage from right paracentesis  Site, ostomy bag drained 500cc clear liquid. dilaudid iv for pain. Slept .  Iv fluids infusing

## 2022-05-07 NOTE — PLAN OF CARE
Problem: GASTROINTESTINAL - ADULT  Goal: Minimal or absence of nausea and vomiting  Description: INTERVENTIONS:  - Maintain adequate hydration with IV or PO as ordered and tolerated  - Nasogastric tube to low intermittent suction as ordered  - Evaluate effectiveness of ordered antiemetic medications  - Provide nonpharmacologic comfort measures as appropriate  - Advance diet as tolerated, if ordered  - Obtain nutritional consult as needed  - Evaluate fluid balance  Outcome: Progressing   Pt. W/ rounded abd. Denies tenderness this am. Passing flatus. Tolerated moderate amount of PO intake for breakfast. Dietitian consulted for supplement recommendations. Ostomy appliance in place to right abdominal paracentesis site. Draining 100-300 ml intermittently. Dr. Shmuel Hernandez in to see pt this am. Recommended removal of appliance to encourage closure of site. Kerlix super sponges and mepilex applied followed by pressure tape. Pt. Encouraged to call if she feels any drainage or drsg feels saturated. Pt's  requested pt to receive protonix BID as he feels it is helping w/ pt's symptoms. Dr. Shmuel Hernandez notified and order received. POC d/w pt and pt's . Problem: PAIN - ADULT  Goal: Verbalizes/displays adequate comfort level or patient's stated pain goal  Description: INTERVENTIONS:  - Encourage pt to monitor pain and request assistance  - Assess pain using appropriate pain scale  - Administer analgesics based on type and severity of pain and evaluate response  - Implement non-pharmacological measures as appropriate and evaluate response  - Consider cultural and social influences on pain and pain management  - Manage/alleviate anxiety  - Utilize distraction and/or relaxation techniques  - Monitor for opioid side effects  - Notify MD/LIP if interventions unsuccessful or patient reports new pain  - Anticipate increased pain with activity and pre-medicate as appropriate  Outcome: Progressing   Pt.  Received IV dilaudid overnight. Declines med this am. Pt. Encouraged to call w/ any new onset of pain or needs. 1155 Pt. Given Dilaudid and Zofran for abd pain and nausea. 1400 Pressure drsg to pt's RLQ abd noted to have leaked from the lower margin. Drsg changed. Some active oozing of serous fluid w/ removal. New pressure dressing applied. 1830 Pt's drsg noted to have some drainage again. Drsg changed again this PM. Less drainage noted to drsg and only scant amount of active drainage. New drsg applied. Pt. Ambulated in the bender w/ myself. Tolerated activity well. Pt. W/ some improvement in appetite today. Pt.  Also ate a magic cup this PM. Pt's  and parents in to visit this PM.

## 2022-05-08 LAB
ANION GAP SERPL CALC-SCNC: 7 MMOL/L (ref 0–18)
BUN BLD-MCNC: 7 MG/DL (ref 7–18)
CALCIUM BLD-MCNC: 8.9 MG/DL (ref 8.5–10.1)
CHLORIDE SERPL-SCNC: 105 MMOL/L (ref 98–112)
CO2 SERPL-SCNC: 22 MMOL/L (ref 21–32)
CREAT BLD-MCNC: 0.98 MG/DL
GLUCOSE BLD-MCNC: 121 MG/DL (ref 70–99)
OSMOLALITY SERPL CALC.SUM OF ELEC: 277 MOSM/KG (ref 275–295)
POTASSIUM SERPL-SCNC: 3.9 MMOL/L (ref 3.5–5.1)
SODIUM SERPL-SCNC: 134 MMOL/L (ref 136–145)

## 2022-05-08 PROCEDURE — 80048 BASIC METABOLIC PNL TOTAL CA: CPT | Performed by: INTERNAL MEDICINE

## 2022-05-08 NOTE — PROGRESS NOTES
Pt is alert and oriented x4 and has a dressing changed to the abdomen. Pt has received dilaudid for pan and zofran for nausea.

## 2022-05-08 NOTE — PLAN OF CARE
Problem: GASTROINTESTINAL - ADULT  Goal: Minimal or absence of nausea and vomiting  Description: INTERVENTIONS:  - Maintain adequate hydration with IV or PO as ordered and tolerated  - Nasogastric tube to low intermittent suction as ordered  - Evaluate effectiveness of ordered antiemetic medications  - Provide nonpharmacologic comfort measures as appropriate  - Advance diet as tolerated, if ordered  - Obtain nutritional consult as needed  - Evaluate fluid balance  Outcome: Progressing   Pt. Tolerating diet w/o complaints. Eating magic cup w/ lunch. Prefers the chocolate flavor. No c/o nausea today. Pt. Feels the addition of a second dose of Protonix in the PM helped. Pt. Passing flatus. No BM today. Drsg to right abd paracentesis site will go long periods w/o leaking. However, does leak intermittently, occasionally to the point it drips down her leg and gets her underwear and gown wet. Problem: PAIN - ADULT  Goal: Verbalizes/displays adequate comfort level or patient's stated pain goal  Description: INTERVENTIONS:  - Encourage pt to monitor pain and request assistance  - Assess pain using appropriate pain scale  - Administer analgesics based on type and severity of pain and evaluate response  - Implement non-pharmacological measures as appropriate and evaluate response  - Consider cultural and social influences on pain and pain management  - Manage/alleviate anxiety  - Utilize distraction and/or relaxation techniques  - Monitor for opioid side effects  - Notify MD/LIP if interventions unsuccessful or patient reports new pain  - Anticipate increased pain with activity and pre-medicate as appropriate  Outcome: Progressing   Pt. W/ some mid abd pain this am. Dilaudid 0.4 mg given w/ good relief reported. Pt's Eliquis resumed today at lunch time. Pt's [facility] dc'd. Pt. Ambulated in the halls w/ myself. Tolerated well. Showered w/ assistance setting up.  Her , father and mother-in-law visiting intermittently.

## 2022-05-09 RX ORDER — HYDROCODONE BITARTRATE AND ACETAMINOPHEN 5; 325 MG/1; MG/1
2 TABLET ORAL EVERY 6 HOURS PRN
Status: DISCONTINUED | OUTPATIENT
Start: 2022-05-09 | End: 2022-05-10

## 2022-05-09 RX ORDER — HYDROCODONE BITARTRATE AND ACETAMINOPHEN 5; 325 MG/1; MG/1
1 TABLET ORAL EVERY 6 HOURS PRN
Status: DISCONTINUED | OUTPATIENT
Start: 2022-05-09 | End: 2022-05-10

## 2022-05-09 RX ORDER — DOCUSATE SODIUM 100 MG/1
100 CAPSULE, LIQUID FILLED ORAL 2 TIMES DAILY
Status: DISCONTINUED | OUTPATIENT
Start: 2022-05-09 | End: 2022-05-11

## 2022-05-09 RX ORDER — POLYETHYLENE GLYCOL 3350 17 G/17G
17 POWDER, FOR SOLUTION ORAL DAILY PRN
Status: DISCONTINUED | OUTPATIENT
Start: 2022-05-09 | End: 2022-05-11

## 2022-05-09 NOTE — PLAN OF CARE
Patient A+Ox4. Right paracentesis site still draining large amounts of fluid. Dressing changed x3. Fall precautions in place. C/o abdominal pain and PRN pain meds given x2. Up with standby assist.     Problem: GASTROINTESTINAL - ADULT  Goal: Minimal or absence of nausea and vomiting  Description: INTERVENTIONS:  - Maintain adequate hydration with IV or PO as ordered and tolerated  - Nasogastric tube to low intermittent suction as ordered  - Evaluate effectiveness of ordered antiemetic medications  - Provide nonpharmacologic comfort measures as appropriate  - Advance diet as tolerated, if ordered  - Obtain nutritional consult as needed  - Evaluate fluid balance  Outcome: Progressing     Problem: PAIN - ADULT  Goal: Verbalizes/displays adequate comfort level or patient's stated pain goal  Description: INTERVENTIONS:  - Encourage pt to monitor pain and request assistance  - Assess pain using appropriate pain scale  - Administer analgesics based on type and severity of pain and evaluate response  - Implement non-pharmacological measures as appropriate and evaluate response  - Consider cultural and social influences on pain and pain management  - Manage/alleviate anxiety  - Utilize distraction and/or relaxation techniques  - Monitor for opioid side effects  - Notify MD/LIP if interventions unsuccessful or patient reports new pain  - Anticipate increased pain with activity and pre-medicate as appropriate  Outcome: Progressing     Problem: SAFETY ADULT - FALL  Goal: Free from fall injury  Description: INTERVENTIONS:  - Assess pt frequently for physical needs  - Identify cognitive and physical deficits and behaviors that affect risk of falls.   - [place] fall precautions as indicated by assessment.  - Educate pt/family on patient safety including physical limitations  - Instruct pt to call for assistance with activity based on assessment  - Modify environment to reduce risk of injury  - Provide assistive devices as appropriate  - Consider OT/PT consult to assist with strengthening/mobility  - Encourage toileting schedule  Outcome: Progressing

## 2022-05-09 NOTE — PLAN OF CARE
Pt. A&O x4. VSS. Afebrile. Pt. C/o abdominal pain; PRN dilaudid given per [facility] with relief. No leaking from paracentesis site noted. Derma-bond placed per GI MD. Dressing changed. Pt. Ambulating with her  in the hallway. Call light within reach. Will continue to monitor.       Problem: GASTROINTESTINAL - ADULT  Goal: Minimal or absence of nausea and vomiting  Description: INTERVENTIONS:  - Maintain adequate hydration with IV or PO as ordered and tolerated  - Nasogastric tube to low intermittent suction as ordered  - Evaluate effectiveness of ordered antiemetic medications  - Provide nonpharmacologic comfort measures as appropriate  - Advance diet as tolerated, if ordered  - Obtain nutritional consult as needed  - Evaluate fluid balance  Outcome: Progressing     Problem: GASTROINTESTINAL - ADULT  Goal: Maintains or returns to baseline bowel function  Description: INTERVENTIONS:  - Assess bowel function  - Maintain adequate hydration with IV or PO as ordered and tolerated  - Evaluate effectiveness of GI medications  - Encourage mobilization and activity  - Obtain nutritional consult as needed  - Establish a toileting routine/schedule  - Consider collaborating with pharmacy to review patient's medication profile  Outcome: Progressing     Problem: PAIN - ADULT  Goal: Verbalizes/displays adequate comfort level or patient's stated pain goal  Description: INTERVENTIONS:  - Encourage pt to monitor pain and request assistance  - Assess pain using appropriate pain scale  - Administer analgesics based on type and severity of pain and evaluate response  - Implement non-pharmacological measures as appropriate and evaluate response  - Consider cultural and social influences on pain and pain management  - Manage/alleviate anxiety  - Utilize distraction and/or relaxation techniques  - Monitor for opioid side effects  - Notify MD/LIP if interventions unsuccessful or patient reports new pain  - Anticipate increased pain with activity and pre-medicate as appropriate  Outcome: Progressing     Problem: SAFETY ADULT - FALL  Goal: Free from fall injury  Description: INTERVENTIONS:  - Assess pt frequently for physical needs  - Identify cognitive and physical deficits and behaviors that affect risk of falls.   - [place] fall precautions as indicated by assessment.  - Educate pt/family on patient safety including physical limitations  - Instruct pt to call for assistance with activity based on assessment  - Modify environment to reduce risk of injury  - Provide assistive devices as appropriate  - Consider OT/PT consult to assist with strengthening/mobility  - Encourage toileting schedule  Outcome: Progressing

## 2022-05-10 LAB
ANION GAP SERPL CALC-SCNC: 7 MMOL/L (ref 0–18)
BUN BLD-MCNC: 6 MG/DL (ref 7–18)
CALCIUM BLD-MCNC: 8.9 MG/DL (ref 8.5–10.1)
CHLORIDE SERPL-SCNC: 106 MMOL/L (ref 98–112)
CO2 SERPL-SCNC: 23 MMOL/L (ref 21–32)
CREAT BLD-MCNC: 1 MG/DL
GLUCOSE BLD-MCNC: 96 MG/DL (ref 70–99)
OSMOLALITY SERPL CALC.SUM OF ELEC: 279 MOSM/KG (ref 275–295)
POTASSIUM SERPL-SCNC: 4.5 MMOL/L (ref 3.5–5.1)
SODIUM SERPL-SCNC: 136 MMOL/L (ref 136–145)

## 2022-05-10 PROCEDURE — 80048 BASIC METABOLIC PNL TOTAL CA: CPT | Performed by: INTERNAL MEDICINE

## 2022-05-10 RX ORDER — OXYCODONE HYDROCHLORIDE 5 MG/1
TABLET ORAL EVERY 6 HOURS PRN
Status: DISCONTINUED | OUTPATIENT
Start: 2022-05-10 | End: 2022-05-10 | Stop reason: SDUPTHER

## 2022-05-10 RX ORDER — OXYCODONE HYDROCHLORIDE 5 MG/1
10 TABLET ORAL EVERY 6 HOURS PRN
Status: DISCONTINUED | OUTPATIENT
Start: 2022-05-10 | End: 2022-05-11

## 2022-05-10 RX ORDER — OXYCODONE HYDROCHLORIDE 5 MG/1
5 TABLET ORAL EVERY 6 HOURS PRN
Status: DISCONTINUED | OUTPATIENT
Start: 2022-05-10 | End: 2022-05-10

## 2022-05-10 RX ORDER — OXYCODONE HYDROCHLORIDE 5 MG/1
5 TABLET ORAL ONCE
Status: DISCONTINUED | OUTPATIENT
Start: 2022-05-10 | End: 2022-05-10

## 2022-05-10 RX ORDER — ONDANSETRON 4 MG/1
4 TABLET, FILM COATED ORAL EVERY 8 HOURS PRN
Status: DISCONTINUED | OUTPATIENT
Start: 2022-05-10 | End: 2022-05-11

## 2022-05-10 RX ORDER — HYDROMORPHONE HYDROCHLORIDE 1 MG/ML
0.4 INJECTION, SOLUTION INTRAMUSCULAR; INTRAVENOUS; SUBCUTANEOUS ONCE
Status: COMPLETED | OUTPATIENT
Start: 2022-05-10 | End: 2022-05-10

## 2022-05-10 RX ORDER — OXYCODONE HYDROCHLORIDE 5 MG/1
5 TABLET ORAL EVERY 6 HOURS PRN
Status: DISCONTINUED | OUTPATIENT
Start: 2022-05-10 | End: 2022-05-11

## 2022-05-10 RX ORDER — OXYCODONE HYDROCHLORIDE 5 MG/1
5 CAPSULE ORAL EVERY 6 HOURS PRN
Qty: 21 CAPSULE | Refills: 0 | Status: SHIPPED | OUTPATIENT
Start: 2022-05-10 | End: 2022-05-11

## 2022-05-10 NOTE — PROGRESS NOTES
Pt is alert and oriented and has a dressing to abdomen from old paracentesis. Pt's dressing remained dry tonight. Pt has complained of pain and nausea zofran and dilaudid given with good relief.

## 2022-05-10 NOTE — PLAN OF CARE
Pt. A&O x4. VSS. Afebrile. Pt. C/o pain; PRN oxy given with relief. Pt. C/o nausea; PRN zofran given with relief. Paracentesis site remains dry. Plan for discharge tomorrow morning. Call light within reach. Will continue to monitor.     Problem: GASTROINTESTINAL - ADULT  Goal: Minimal or absence of nausea and vomiting  Description: INTERVENTIONS:  - Maintain adequate hydration with IV or PO as ordered and tolerated  - Nasogastric tube to low intermittent suction as ordered  - Evaluate effectiveness of ordered antiemetic medications  - Provide nonpharmacologic comfort measures as appropriate  - Advance diet as tolerated, if ordered  - Obtain nutritional consult as needed  - Evaluate fluid balance  Outcome: Progressing     Problem: GASTROINTESTINAL - ADULT  Goal: Maintains or returns to baseline bowel function  Description: INTERVENTIONS:  - Assess bowel function  - Maintain adequate hydration with IV or PO as ordered and tolerated  - Evaluate effectiveness of GI medications  - Encourage mobilization and activity  - Obtain nutritional consult as needed  - Establish a toileting routine/schedule  - Consider collaborating with pharmacy to review patient's medication profile  Outcome: Progressing     Problem: PAIN - ADULT  Goal: Verbalizes/displays adequate comfort level or patient's stated pain goal  Description: INTERVENTIONS:  - Encourage pt to monitor pain and request assistance  - Assess pain using appropriate pain scale  - Administer analgesics based on type and severity of pain and evaluate response  - Implement non-pharmacological measures as appropriate and evaluate response  - Consider cultural and social influences on pain and pain management  - Manage/alleviate anxiety  - Utilize distraction and/or relaxation techniques  - Monitor for opioid side effects  - Notify MD/LIP if interventions unsuccessful or patient reports new pain  - Anticipate increased pain with activity and pre-medicate as appropriate  Outcome: Progressing

## 2022-05-11 VITALS
HEIGHT: 66 IN | TEMPERATURE: 98 F | RESPIRATION RATE: 18 BRPM | SYSTOLIC BLOOD PRESSURE: 113 MMHG | OXYGEN SATURATION: 100 % | DIASTOLIC BLOOD PRESSURE: 76 MMHG | WEIGHT: 121 LBS | BODY MASS INDEX: 19.44 KG/M2 | HEART RATE: 86 BPM

## 2022-05-11 LAB
ANION GAP SERPL CALC-SCNC: 6 MMOL/L (ref 0–18)
BUN BLD-MCNC: 6 MG/DL (ref 7–18)
CALCIUM BLD-MCNC: 8.7 MG/DL (ref 8.5–10.1)
CHLORIDE SERPL-SCNC: 104 MMOL/L (ref 98–112)
CO2 SERPL-SCNC: 23 MMOL/L (ref 21–32)
CREAT BLD-MCNC: 0.85 MG/DL
GLUCOSE BLD-MCNC: 100 MG/DL (ref 70–99)
OSMOLALITY SERPL CALC.SUM OF ELEC: 274 MOSM/KG (ref 275–295)
POTASSIUM SERPL-SCNC: 4.1 MMOL/L (ref 3.5–5.1)
SODIUM SERPL-SCNC: 133 MMOL/L (ref 136–145)

## 2022-05-11 PROCEDURE — 80048 BASIC METABOLIC PNL TOTAL CA: CPT | Performed by: INTERNAL MEDICINE

## 2022-05-11 RX ORDER — SENNA AND DOCUSATE SODIUM 50; 8.6 MG/1; MG/1
1 TABLET, FILM COATED ORAL 2 TIMES DAILY
Qty: 60 TABLET | Refills: 0 | Status: SHIPPED | OUTPATIENT
Start: 2022-05-11

## 2022-05-11 RX ORDER — ONDANSETRON 4 MG/1
4 TABLET, FILM COATED ORAL EVERY 8 HOURS PRN
Qty: 30 TABLET | Refills: 0 | Status: SHIPPED | OUTPATIENT
Start: 2022-05-11

## 2022-05-11 RX ORDER — OXYCODONE HYDROCHLORIDE 10 MG/1
10 TABLET ORAL EVERY 6 HOURS PRN
Qty: 35 TABLET | Refills: 0 | Status: SHIPPED | OUTPATIENT
Start: 2022-05-11

## 2022-05-11 RX ORDER — PANTOPRAZOLE SODIUM 40 MG/1
40 TABLET, DELAYED RELEASE ORAL
Qty: 60 TABLET | Refills: 0 | Status: SHIPPED | OUTPATIENT
Start: 2022-05-11

## 2022-05-11 NOTE — PLAN OF CARE
Patient alert and oriented x 4, lungs clear on room air, abdomen rounded, tender, para sites CDI, voiding adequate amounts, oxy admin for complaints of lower abdominal, not effective per patient, additional 1 time dilaudid IV ordered by MD and administered with positive effect, zofran admin for nausea, patient requesting IV zofran, VSS, medications per MAR, IV site saline locked, flushed and patent. 06:00-Medicated for pain 6/10 with oxy 10mg.

## 2022-05-11 NOTE — PLAN OF CARE
Problem: GASTROINTESTINAL - ADULT  Goal: Minimal or absence of nausea and vomiting  Description: INTERVENTIONS:  - Maintain adequate hydration with IV or PO as ordered and tolerated  - Nasogastric tube to low intermittent suction as ordered  - Evaluate effectiveness of ordered antiemetic medications  - Provide nonpharmacologic comfort measures as appropriate  - Advance diet as tolerated, if ordered  - Obtain nutritional consult as needed  - Evaluate fluid balance  Outcome: Progressing  Goal: Maintains or returns to baseline bowel function  Description: INTERVENTIONS:  - Assess bowel function  - Maintain adequate hydration with IV or PO as ordered and tolerated  - Evaluate effectiveness of GI medications  - Encourage mobilization and activity  - Obtain nutritional consult as needed  - Establish a toileting routine/schedule  - Consider collaborating with pharmacy to review patient's medication profile  Outcome: Progressing     Problem: PAIN - ADULT  Goal: Verbalizes/displays adequate comfort level or patient's stated pain goal  Description: INTERVENTIONS:  - Encourage pt to monitor pain and request assistance  - Assess pain using appropriate pain scale  - Administer analgesics based on type and severity of pain and evaluate response  - Implement non-pharmacological measures as appropriate and evaluate response  - Consider cultural and social influences on pain and pain management  - Manage/alleviate anxiety  - Utilize distraction and/or relaxation techniques  - Monitor for opioid side effects  - Notify MD/LIP if interventions unsuccessful or patient reports new pain  - Anticipate increased pain with activity and pre-medicate as appropriate  Outcome: Progressing     Problem: SAFETY ADULT - FALL  Goal: Free from fall injury  Description: INTERVENTIONS:  - Assess pt frequently for physical needs  - Identify cognitive and physical deficits and behaviors that affect risk of falls.   - [place] fall precautions as indicated by assessment.  - Educate pt/family on patient safety including physical limitations  - Instruct pt to call for assistance with activity based on assessment  - Modify environment to reduce risk of injury  - Provide assistive devices as appropriate  - Consider OT/PT consult to assist with strengthening/mobility  - Encourage toileting schedule  Outcome: Progressing          Pt is alert and  oriented x 4. Vitals stable. Pt pain improved a lot this morning after Oxycodone 10 mg. Pt pain was controlled until this afternoon. Around noon time pt Pain was about 3-4/10 pain scale. OXy IR 10 mg given. PT is ready to be discharge as pain is better controlled. Discharge instructions given to the pt. Pt verbalized understanding.

## 2022-05-12 NOTE — PAYOR COMM NOTE
Discharge Notification    Patient Name: Layla Valle  Payor: MARI CASTLEO  Subscriber #: [id number]  Authorization Number: [id number]  Admit Date/Time: 5/4/2022 1:28 PM  Discharge Date/Time: 5/11/2022 1:19 PM

## 2022-05-22 ENCOUNTER — HOSPITAL ENCOUNTER (INPATIENT)
Facility: HOSPITAL | Age: 57
LOS: 6 days | Discharge: HOME | End: 2022-05-28
Attending: EMERGENCY MEDICINE | Admitting: INTERNAL MEDICINE
Payer: COMMERCIAL

## 2022-05-22 ENCOUNTER — APPOINTMENT (OUTPATIENT)
Dept: CT IMAGING | Facility: HOSPITAL | Age: 57
End: 2022-05-22
Attending: EMERGENCY MEDICINE
Payer: COMMERCIAL

## 2022-05-22 DIAGNOSIS — R11.2 NAUSEA AND VOMITING IN ADULT: Primary | ICD-10-CM

## 2022-05-22 LAB
ALBUMIN SERPL-MCNC: 2.9 G/DL (ref 3.4–5)
ALBUMIN/GLOB SERPL: 0.7 {RATIO} (ref 1–2)
ALP LIVER SERPL-CCNC: 111 U/L
ALT SERPL-CCNC: 26 U/L
ANION GAP SERPL CALC-SCNC: 11 MMOL/L (ref 0–18)
AST SERPL-CCNC: 51 U/L (ref 15–37)
BASOPHILS # BLD AUTO: 0.02 X10(3) UL (ref 0–0.2)
BASOPHILS NFR BLD AUTO: 0.2 %
BILIRUB SERPL-MCNC: 0.8 MG/DL (ref 0.1–2)
BUN BLD-MCNC: 11 MG/DL (ref 7–18)
CALCIUM BLD-MCNC: 9.4 MG/DL (ref 8.5–10.1)
CHLORIDE SERPL-SCNC: 99 MMOL/L (ref 98–112)
CO2 SERPL-SCNC: 20 MMOL/L (ref 21–32)
CREAT BLD-MCNC: 0.84 MG/DL
D DIMER PPP FEU-MCNC: 3.31 UG/ML FEU (ref ?–0.57)
EOSINOPHIL # BLD AUTO: 0 X10(3) UL (ref 0–0.7)
EOSINOPHIL NFR BLD AUTO: 0 %
ERYTHROCYTE [DISTWIDTH] IN BLOOD BY AUTOMATED COUNT: 14.2 %
GLOBULIN PLAS-MCNC: 4 G/DL (ref 2.8–4.4)
GLUCOSE BLD-MCNC: 141 MG/DL (ref 70–99)
HCT VFR BLD AUTO: 42.4 %
HGB BLD-MCNC: 13.8 G/DL
IMM GRANULOCYTES # BLD AUTO: 0.03 X10(3) UL (ref 0–1)
IMM GRANULOCYTES NFR BLD: 0.3 %
LIPASE SERPL-CCNC: 89 U/L (ref 73–393)
LYMPHOCYTES # BLD AUTO: 0.56 X10(3) UL (ref 1–4)
LYMPHOCYTES NFR BLD AUTO: 6 %
MCH RBC QN AUTO: 27 PG (ref 26–34)
MCHC RBC AUTO-ENTMCNC: 32.5 G/DL (ref 31–37)
MCV RBC AUTO: 83 FL
MONOCYTES # BLD AUTO: 0.19 X10(3) UL (ref 0.1–1)
MONOCYTES NFR BLD AUTO: 2 %
NEUTROPHILS # BLD AUTO: 8.55 X10 (3) UL (ref 1.5–7.7)
NEUTROPHILS # BLD AUTO: 8.55 X10(3) UL (ref 1.5–7.7)
NEUTROPHILS NFR BLD AUTO: 91.5 %
OSMOLALITY SERPL CALC.SUM OF ELEC: 272 MOSM/KG (ref 275–295)
PLATELET # BLD AUTO: 188 10(3)UL (ref 150–450)
POTASSIUM SERPL-SCNC: 4.9 MMOL/L (ref 3.5–5.1)
PROT SERPL-MCNC: 6.9 G/DL (ref 6.4–8.2)
RBC # BLD AUTO: 5.11 X10(6)UL
SARS-COV-2 RNA RESP QL NAA+PROBE: NOT DETECTED
SODIUM SERPL-SCNC: 130 MMOL/L (ref 136–145)
TROPONIN I HIGH SENSITIVITY: 8 NG/L
WBC # BLD AUTO: 9.4 X10(3) UL (ref 4–11)

## 2022-05-22 PROCEDURE — 99285 EMERGENCY DEPT VISIT HI MDM: CPT | Performed by: EMERGENCY MEDICINE

## 2022-05-22 PROCEDURE — 96374 THER/PROPH/DIAG INJ IV PUSH: CPT | Performed by: EMERGENCY MEDICINE

## 2022-05-22 PROCEDURE — 93010 ELECTROCARDIOGRAM REPORT: CPT | Performed by: EMERGENCY MEDICINE

## 2022-05-22 PROCEDURE — 80053 COMPREHEN METABOLIC PANEL: CPT | Performed by: EMERGENCY MEDICINE

## 2022-05-22 PROCEDURE — 71275 CT ANGIOGRAPHY CHEST: CPT | Performed by: EMERGENCY MEDICINE

## 2022-05-22 PROCEDURE — 93005 ELECTROCARDIOGRAM TRACING: CPT

## 2022-05-22 PROCEDURE — 83690 ASSAY OF LIPASE: CPT

## 2022-05-22 PROCEDURE — 96361 HYDRATE IV INFUSION ADD-ON: CPT | Performed by: EMERGENCY MEDICINE

## 2022-05-22 PROCEDURE — 85025 COMPLETE CBC W/AUTO DIFF WBC: CPT

## 2022-05-22 PROCEDURE — 84484 ASSAY OF TROPONIN QUANT: CPT | Performed by: EMERGENCY MEDICINE

## 2022-05-22 PROCEDURE — 85025 COMPLETE CBC W/AUTO DIFF WBC: CPT | Performed by: EMERGENCY MEDICINE

## 2022-05-22 PROCEDURE — 83690 ASSAY OF LIPASE: CPT | Performed by: EMERGENCY MEDICINE

## 2022-05-22 PROCEDURE — 85379 FIBRIN DEGRADATION QUANT: CPT | Performed by: EMERGENCY MEDICINE

## 2022-05-22 PROCEDURE — 96375 TX/PRO/DX INJ NEW DRUG ADDON: CPT | Performed by: EMERGENCY MEDICINE

## 2022-05-22 PROCEDURE — 96376 TX/PRO/DX INJ SAME DRUG ADON: CPT | Performed by: EMERGENCY MEDICINE

## 2022-05-22 PROCEDURE — 74177 CT ABD & PELVIS W/CONTRAST: CPT | Performed by: EMERGENCY MEDICINE

## 2022-05-22 PROCEDURE — 80053 COMPREHEN METABOLIC PANEL: CPT

## 2022-05-22 RX ORDER — OXYCODONE HYDROCHLORIDE 5 MG/1
10 TABLET ORAL EVERY 6 HOURS PRN
Status: DISCONTINUED | OUTPATIENT
Start: 2022-05-22 | End: 2022-05-28

## 2022-05-22 RX ORDER — ACETAMINOPHEN 325 MG/1
650 TABLET ORAL EVERY 6 HOURS PRN
Status: DISCONTINUED | OUTPATIENT
Start: 2022-05-22 | End: 2022-05-28

## 2022-05-22 RX ORDER — ONDANSETRON 2 MG/ML
4 INJECTION INTRAMUSCULAR; INTRAVENOUS ONCE
Status: COMPLETED | OUTPATIENT
Start: 2022-05-22 | End: 2022-05-22

## 2022-05-22 RX ORDER — HYDROMORPHONE HYDROCHLORIDE 1 MG/ML
0.5 INJECTION, SOLUTION INTRAMUSCULAR; INTRAVENOUS; SUBCUTANEOUS
Status: DISCONTINUED | OUTPATIENT
Start: 2022-05-22 | End: 2022-05-23

## 2022-05-22 RX ORDER — HYDROMORPHONE HYDROCHLORIDE 1 MG/ML
1 INJECTION, SOLUTION INTRAMUSCULAR; INTRAVENOUS; SUBCUTANEOUS ONCE
Status: COMPLETED | OUTPATIENT
Start: 2022-05-22 | End: 2022-05-22

## 2022-05-22 RX ORDER — ONDANSETRON 2 MG/ML
4 INJECTION INTRAMUSCULAR; INTRAVENOUS EVERY 6 HOURS PRN
Status: DISCONTINUED | OUTPATIENT
Start: 2022-05-22 | End: 2022-05-28

## 2022-05-22 RX ORDER — SODIUM CHLORIDE 9 MG/ML
INJECTION, SOLUTION INTRAVENOUS CONTINUOUS
Status: DISCONTINUED | OUTPATIENT
Start: 2022-05-22 | End: 2022-05-28

## 2022-05-22 RX ORDER — IOHEXOL 350 MG/ML
100 INJECTION, SOLUTION INTRAVENOUS
Status: COMPLETED | OUTPATIENT
Start: 2022-05-22 | End: 2022-05-22

## 2022-05-22 RX ORDER — SENNA AND DOCUSATE SODIUM 50; 8.6 MG/1; MG/1
1 TABLET, FILM COATED ORAL 2 TIMES DAILY
Status: DISCONTINUED | OUTPATIENT
Start: 2022-05-22 | End: 2022-05-28

## 2022-05-22 RX ORDER — PANTOPRAZOLE SODIUM 40 MG/1
40 TABLET, DELAYED RELEASE ORAL
Status: DISCONTINUED | OUTPATIENT
Start: 2022-05-23 | End: 2022-05-28

## 2022-05-22 RX ORDER — MORPHINE SULFATE 4 MG/ML
4 INJECTION, SOLUTION INTRAMUSCULAR; INTRAVENOUS EVERY 30 MIN PRN
Status: DISCONTINUED | OUTPATIENT
Start: 2022-05-22 | End: 2022-05-28

## 2022-05-22 RX ORDER — HEPARIN SODIUM 5000 [USP'U]/ML
5000 INJECTION, SOLUTION INTRAVENOUS; SUBCUTANEOUS EVERY 8 HOURS SCHEDULED
Status: DISCONTINUED | OUTPATIENT
Start: 2022-05-22 | End: 2022-05-22

## 2022-05-22 RX ORDER — PROCHLORPERAZINE EDISYLATE 5 MG/ML
5 INJECTION INTRAMUSCULAR; INTRAVENOUS EVERY 8 HOURS PRN
Status: DISCONTINUED | OUTPATIENT
Start: 2022-05-22 | End: 2022-05-28

## 2022-05-22 RX ORDER — SPIRONOLACTONE 25 MG/1
50 TABLET ORAL DAILY
Status: DISCONTINUED | OUTPATIENT
Start: 2022-05-22 | End: 2022-05-26

## 2022-05-23 LAB
ANION GAP SERPL CALC-SCNC: 8 MMOL/L (ref 0–18)
ATRIAL RATE: 117 BPM
BUN BLD-MCNC: 8 MG/DL (ref 7–18)
CALCIUM BLD-MCNC: 9.1 MG/DL (ref 8.5–10.1)
CHLORIDE SERPL-SCNC: 106 MMOL/L (ref 98–112)
CO2 SERPL-SCNC: 21 MMOL/L (ref 21–32)
CREAT BLD-MCNC: 0.74 MG/DL
GLUCOSE BLD-MCNC: 87 MG/DL (ref 70–99)
OSMOLALITY SERPL CALC.SUM OF ELEC: 278 MOSM/KG (ref 275–295)
P AXIS: 81 DEGREES
P-R INTERVAL: 130 MS
POTASSIUM SERPL-SCNC: 4.2 MMOL/L (ref 3.5–5.1)
Q-T INTERVAL: 326 MS
QRS DURATION: 78 MS
QTC CALCULATION (BEZET): 454 MS
R AXIS: 85 DEGREES
SODIUM SERPL-SCNC: 135 MMOL/L (ref 136–145)
T AXIS: 69 DEGREES
VENTRICULAR RATE: 117 BPM

## 2022-05-23 PROCEDURE — 80048 BASIC METABOLIC PNL TOTAL CA: CPT | Performed by: HOSPITALIST

## 2022-05-23 RX ORDER — HYDROMORPHONE HYDROCHLORIDE 1 MG/ML
INJECTION, SOLUTION INTRAMUSCULAR; INTRAVENOUS; SUBCUTANEOUS
Status: DISCONTINUED | OUTPATIENT
Start: 2022-05-23 | End: 2022-05-28

## 2022-05-23 NOTE — CM/SW NOTE
SW received call from [address] [place] [address] regarding patient resume of care order. They reported that they had recently discharged patient from their services due to patient's wounds being healed and no longer needing HH. If patient is to need [facility] services patient will need to begin as a new patient. SW will continue to follow for PT recommendation. SW will continue to remain available for any additional DC needs or concerns.      Radha President MSW, RUBENW  Discharge Planner   [phone]

## 2022-05-23 NOTE — ED QUICK NOTES
Orders for admission, patient is aware of plan and ready to go upstairs. Any questions, please call ED RN Hailey Ford at extension [phone].      Patient Covid vaccination status: Fully vaccinated     COVID Test Ordered in ED: Rapid SARS-CoV-2 by PCR    COVID Suspicion at Admission: Low clinical suspicion for COVID    Running Infusions:  None    Mental Status/LOC at time of transport: AOx4    Other pertinent information:   CIWA score: N/A   NIH score:  N/A

## 2022-05-23 NOTE — PLAN OF CARE
New admission, pt A&Ox4 on room air, mild-moderate abdomen pain tolerating medications. Has some nausea. Had trouble accessing her port a cath, placed consult for vascular to see patient . Call light within reach, frequent checks made, needs met. Spoke with vascular this morning about port and they said they do not access ports so order was discontinued. Pt has a 24g in hand that is not holding up well. Placed order for vascular team to put in a PIV.      Problem: Patient/Family Goals  Goal: Patient/Family Long Term Goal  Description: Patient's Long Term Goal:     Interventions:  -   - See additional Care Plan goals for specific interventions  Outcome: Progressing  Goal: Patient/Family Short Term Goal  Description: Patient's Short Term Goal:     Interventions:   -  - See additional Care Plan goals for specific interventions  Outcome: Progressing

## 2022-05-23 NOTE — IMAGING NOTE
Pt received Eliquis today will do  Paracentesis in 48 hrs. Tentatively Wednesday.  Nurse Amada velez  And  Dr Eddie Apodaca made aware

## 2022-05-23 NOTE — CM/SW NOTE
Per RN rounds, patient is from home with current [facility] services. SW completed chart review and patient is current with [facility]. SW submitted DINORAH order and faxed to [facility] to continue patient's home health services post DC. SW will continue to remain available for any additional DC needs or concerns.      Roselyn Burns MSW, LSW  Discharge Planner   [phone]

## 2022-05-24 LAB
ANION GAP SERPL CALC-SCNC: 11 MMOL/L (ref 0–18)
BILIRUB UR QL STRIP.AUTO: NEGATIVE
BUN BLD-MCNC: 8 MG/DL (ref 7–18)
CALCIUM BLD-MCNC: 8.8 MG/DL (ref 8.5–10.1)
CHLORIDE SERPL-SCNC: 106 MMOL/L (ref 98–112)
CO2 SERPL-SCNC: 21 MMOL/L (ref 21–32)
CREAT BLD-MCNC: 0.85 MG/DL
ERYTHROCYTE [DISTWIDTH] IN BLOOD BY AUTOMATED COUNT: 14.2 %
GLUCOSE BLD-MCNC: 83 MG/DL (ref 70–99)
GLUCOSE UR STRIP.AUTO-MCNC: NEGATIVE MG/DL
HCT VFR BLD AUTO: 33.6 %
HGB BLD-MCNC: 10.5 G/DL
HYALINE CASTS #/AREA URNS AUTO: PRESENT /LPF
KETONES UR STRIP.AUTO-MCNC: 20 MG/DL
MAGNESIUM SERPL-MCNC: 1.9 MG/DL (ref 1.6–2.6)
MCH RBC QN AUTO: 26.9 PG (ref 26–34)
MCHC RBC AUTO-ENTMCNC: 31.3 G/DL (ref 31–37)
MCV RBC AUTO: 86.2 FL
NITRITE UR QL STRIP.AUTO: NEGATIVE
OSMOLALITY SERPL CALC.SUM OF ELEC: 283 MOSM/KG (ref 275–295)
PH UR STRIP.AUTO: 5 [PH] (ref 5–8)
PLATELET # BLD AUTO: 155 10(3)UL (ref 150–450)
POTASSIUM SERPL-SCNC: 4.4 MMOL/L (ref 3.5–5.1)
PROT UR STRIP.AUTO-MCNC: 30 MG/DL
RBC # BLD AUTO: 3.9 X10(6)UL
RBC UR QL AUTO: NEGATIVE
SODIUM SERPL-SCNC: 138 MMOL/L (ref 136–145)
SP GR UR STRIP.AUTO: >1.03 (ref 1–1.03)
UROBILINOGEN UR STRIP.AUTO-MCNC: <2 MG/DL
WBC # BLD AUTO: 6.4 X10(3) UL (ref 4–11)

## 2022-05-24 PROCEDURE — 87086 URINE CULTURE/COLONY COUNT: CPT | Performed by: EMERGENCY MEDICINE

## 2022-05-24 PROCEDURE — 85027 COMPLETE CBC AUTOMATED: CPT | Performed by: INTERNAL MEDICINE

## 2022-05-24 PROCEDURE — 83735 ASSAY OF MAGNESIUM: CPT | Performed by: INTERNAL MEDICINE

## 2022-05-24 PROCEDURE — 80048 BASIC METABOLIC PNL TOTAL CA: CPT | Performed by: INTERNAL MEDICINE

## 2022-05-24 PROCEDURE — 81001 URINALYSIS AUTO W/SCOPE: CPT | Performed by: EMERGENCY MEDICINE

## 2022-05-24 RX ORDER — MULTIPLE VITAMINS W/ MINERALS TAB 9MG-400MCG
1 TAB ORAL DAILY
Status: DISCONTINUED | OUTPATIENT
Start: 2022-05-24 | End: 2022-05-28

## 2022-05-24 NOTE — PLAN OF CARE
A/o x4. Weak. Loss appetite. Encouraged PO intake. Poor oral intake observed. C/o abdominal pain with nausea. Prn zofran and pain meds given with some relief. UA sent. Slept.    Problem: GASTROINTESTINAL - ADULT  Goal: Maintains or returns to baseline bowel function  Description: INTERVENTIONS:  - Assess bowel function  - Maintain adequate hydration with IV or PO as ordered and tolerated  - Evaluate effectiveness of GI medications  - Encourage mobilization and activity  - Obtain nutritional consult as needed  - Establish a toileting routine/schedule  - Consider collaborating with pharmacy to review patient's medication profile  Outcome: Progressing

## 2022-05-24 NOTE — PLAN OF CARE
Pt. A&O x4. VSS. Afebrile. Pt. C/o abdominal pain; PRN pain medication given per MAR. Pt. C/o nausea; PRN zofran given per MAR. Pt. Remains on clear liquid diet. Call light within reach. Will continue to monitor.     Problem: GASTROINTESTINAL - ADULT  Goal: Minimal or absence of nausea and vomiting  Description: INTERVENTIONS:  - Maintain adequate hydration with IV or PO as ordered and tolerated  - Nasogastric tube to low intermittent suction as ordered  - Evaluate effectiveness of ordered antiemetic medications  - Provide nonpharmacologic comfort measures as appropriate  - Advance diet as tolerated, if ordered  - Obtain nutritional consult as needed  - Evaluate fluid balance  Outcome: Progressing     Problem: GASTROINTESTINAL - ADULT  Goal: Maintains or returns to baseline bowel function  Description: INTERVENTIONS:  - Assess bowel function  - Maintain adequate hydration with IV or PO as ordered and tolerated  - Evaluate effectiveness of GI medications  - Encourage mobilization and activity  - Obtain nutritional consult as needed  - Establish a toileting routine/schedule  - Consider collaborating with pharmacy to review patient's medication profile  Outcome: Progressing     Problem: PAIN - ADULT  Goal: Verbalizes/displays adequate comfort level or patient's stated pain goal  Description: INTERVENTIONS:  - Encourage pt to monitor pain and request assistance  - Assess pain using appropriate pain scale  - Administer analgesics based on type and severity of pain and evaluate response  - Implement non-pharmacological measures as appropriate and evaluate response  - Consider cultural and social influences on pain and pain management  - Manage/alleviate anxiety  - Utilize distraction and/or relaxation techniques  - Monitor for opioid side effects  - Notify MD/LIP if interventions unsuccessful or patient reports new pain  - Anticipate increased pain with activity and pre-medicate as appropriate  Outcome: Progressing

## 2022-05-25 ENCOUNTER — APPOINTMENT (OUTPATIENT)
Dept: ULTRASOUND IMAGING | Facility: HOSPITAL | Age: 57
End: 2022-05-25
Attending: INTERNAL MEDICINE
Payer: COMMERCIAL

## 2022-05-25 LAB
ANION GAP SERPL CALC-SCNC: 10 MMOL/L (ref 0–18)
BASOPHILS NFR PRT: 0 %
BUN BLD-MCNC: 8 MG/DL (ref 7–18)
CALCIUM BLD-MCNC: 9 MG/DL (ref 8.5–10.1)
CHLORIDE SERPL-SCNC: 104 MMOL/L (ref 98–112)
CO2 SERPL-SCNC: 22 MMOL/L (ref 21–32)
COLOR FLD: YELLOW
CREAT BLD-MCNC: 0.84 MG/DL
EOSINOPHIL NFR PRT: 0 %
ERYTHROCYTE [DISTWIDTH] IN BLOOD BY AUTOMATED COUNT: 14.2 %
GLUCOSE BLD-MCNC: 102 MG/DL (ref 70–99)
HCT VFR BLD AUTO: 34.4 %
HGB BLD-MCNC: 11 G/DL
INR BLD: 1.09 (ref 0.8–1.2)
LYMPHOCYTES NFR PRT: 66 %
MAGNESIUM SERPL-MCNC: 1.9 MG/DL (ref 1.6–2.6)
MCH RBC QN AUTO: 27.3 PG (ref 26–34)
MCHC RBC AUTO-ENTMCNC: 32 G/DL (ref 31–37)
MCV RBC AUTO: 85.4 FL
MONOS+MACROS NFR PRT: 6 %
NEUTROPHILS PERITONEAL FLUID: 28 %
OSMOLALITY SERPL CALC.SUM OF ELEC: 281 MOSM/KG (ref 275–295)
PLASMA CELLS NFR PRT MANUAL: 0 %
PLATELET # BLD AUTO: 178 10(3)UL (ref 150–450)
POTASSIUM SERPL-SCNC: 4.2 MMOL/L (ref 3.5–5.1)
PROTHROMBIN TIME: 14.2 SECONDS (ref 11.6–14.8)
RBC # BLD AUTO: 4.03 X10(6)UL
RBC PERITONEAL FLUID: <3000 /MM3
SODIUM SERPL-SCNC: 136 MMOL/L (ref 136–145)
TOTAL CELLS COUNTED FLD: 100
TURBIDITY CSF QL: CLEAR
WBC # BLD AUTO: 6.2 X10(3) UL (ref 4–11)
WBC # PRT: 51 /MM3

## 2022-05-25 PROCEDURE — 80048 BASIC METABOLIC PNL TOTAL CA: CPT | Performed by: INTERNAL MEDICINE

## 2022-05-25 PROCEDURE — 87205 SMEAR GRAM STAIN: CPT | Performed by: INTERNAL MEDICINE

## 2022-05-25 PROCEDURE — 49083 ABD PARACENTESIS W/IMAGING: CPT | Performed by: INTERNAL MEDICINE

## 2022-05-25 PROCEDURE — 85027 COMPLETE CBC AUTOMATED: CPT | Performed by: INTERNAL MEDICINE

## 2022-05-25 PROCEDURE — 85610 PROTHROMBIN TIME: CPT | Performed by: HOSPITALIST

## 2022-05-25 PROCEDURE — 89051 BODY FLUID CELL COUNT: CPT | Performed by: HOSPITALIST

## 2022-05-25 PROCEDURE — 87070 CULTURE OTHR SPECIMN AEROBIC: CPT | Performed by: INTERNAL MEDICINE

## 2022-05-25 PROCEDURE — 83735 ASSAY OF MAGNESIUM: CPT | Performed by: INTERNAL MEDICINE

## 2022-05-25 PROCEDURE — 0W9G3ZZ DRAINAGE OF PERITONEAL CAVITY, PERCUTANEOUS APPROACH: ICD-10-PCS | Performed by: RADIOLOGY

## 2022-05-25 PROCEDURE — 89050 BODY FLUID CELL COUNT: CPT | Performed by: HOSPITALIST

## 2022-05-25 PROCEDURE — 89050 BODY FLUID CELL COUNT: CPT | Performed by: INTERNAL MEDICINE

## 2022-05-25 NOTE — PLAN OF CARE
A/o x4. Oxygen saturation  % on room air. Still c/o nausea/ abdominal distention. Prn zofran/ compazine/ pain med given with good relief. NPO at midnight for possible paracentesis today. Patient verbalized understanding.    Problem: PAIN - ADULT  Goal: Verbalizes/displays adequate comfort level or patient's stated pain goal  Description: INTERVENTIONS:  - Encourage pt to monitor pain and request assistance  - Assess pain using appropriate pain scale  - Administer analgesics based on type and severity of pain and evaluate response  - Implement non-pharmacological measures as appropriate and evaluate response  - Consider cultural and social influences on pain and pain management  - Manage/alleviate anxiety  - Utilize distraction and/or relaxation techniques  - Monitor for opioid side effects  - Notify MD/LIP if interventions unsuccessful or patient reports new pain  - Anticipate increased pain with activity and pre-medicate as appropriate  Outcome: Not Progressing

## 2022-05-25 NOTE — PLAN OF CARE
Assumed care @ 0730. Patient's abdomen distended, ,firm, tender. bowel sounds hypoactive. Dilaudid given as needed, with moderate relief. Patient had Paracentesis, procedure tolerated well, puncture site clean, dry and intact, Patient's vital signs stable.

## 2022-05-25 NOTE — PROCEDURES
[facility]  Procedure Note    Jhonatan Rincon Patient Status:  Inpatient    1965 MRN [id number]   [facility] 4NW-A Attending Lc Davidson, *   Hosp Day # 3 PCP Uzair Montgomery MD     LOCATION: Left lower quadrant  FLUID REMOVED: 4.7 L  MEDICATION: 10 cc of 1% Lidocaine for local anesthetic. COMPLICATIONS: None. LABORATORY: 1 L    CONCLUSION: Ultrasound-guided paracentesis was performed without complication.     Megan Godoy MD  2022

## 2022-05-26 LAB
ANION GAP SERPL CALC-SCNC: 7 MMOL/L (ref 0–18)
BUN BLD-MCNC: 6 MG/DL (ref 7–18)
CALCIUM BLD-MCNC: 8.4 MG/DL (ref 8.5–10.1)
CHLORIDE SERPL-SCNC: 99 MMOL/L (ref 98–112)
CO2 SERPL-SCNC: 25 MMOL/L (ref 21–32)
CREAT BLD-MCNC: 0.83 MG/DL
GLUCOSE BLD-MCNC: 118 MG/DL (ref 70–99)
OSMOLALITY SERPL CALC.SUM OF ELEC: 271 MOSM/KG (ref 275–295)
POTASSIUM SERPL-SCNC: 3.2 MMOL/L (ref 3.5–5.1)
SODIUM SERPL-SCNC: 131 MMOL/L (ref 136–145)

## 2022-05-26 PROCEDURE — 80048 BASIC METABOLIC PNL TOTAL CA: CPT | Performed by: INTERNAL MEDICINE

## 2022-05-26 RX ORDER — SPIRONOLACTONE 100 MG/1
100 TABLET, FILM COATED ORAL DAILY
Status: DISCONTINUED | OUTPATIENT
Start: 2022-05-26 | End: 2022-05-28

## 2022-05-26 RX ORDER — FUROSEMIDE 40 MG/1
40 TABLET ORAL DAILY
Status: DISCONTINUED | OUTPATIENT
Start: 2022-05-26 | End: 2022-05-28

## 2022-05-26 NOTE — PROGRESS NOTES
No change , complained of abdominal pain, dilaudid and compazine given. Paracentesis site intact, had one loose stool .  Slept well

## 2022-05-27 LAB
ALBUMIN SERPL-MCNC: 2.2 G/DL (ref 3.4–5)
ALBUMIN/GLOB SERPL: 0.7 {RATIO} (ref 1–2)
ALP LIVER SERPL-CCNC: 91 U/L
ALT SERPL-CCNC: 16 U/L
ANION GAP SERPL CALC-SCNC: 9 MMOL/L (ref 0–18)
AST SERPL-CCNC: 24 U/L (ref 15–37)
BILIRUB SERPL-MCNC: 0.6 MG/DL (ref 0.1–2)
BUN BLD-MCNC: 5 MG/DL (ref 7–18)
CALCIUM BLD-MCNC: 8.6 MG/DL (ref 8.5–10.1)
CHLORIDE SERPL-SCNC: 101 MMOL/L (ref 98–112)
CO2 SERPL-SCNC: 24 MMOL/L (ref 21–32)
CREAT BLD-MCNC: 0.79 MG/DL
GLOBULIN PLAS-MCNC: 3.2 G/DL (ref 2.8–4.4)
GLUCOSE BLD-MCNC: 102 MG/DL (ref 70–99)
OSMOLALITY SERPL CALC.SUM OF ELEC: 275 MOSM/KG (ref 275–295)
POTASSIUM SERPL-SCNC: 3.7 MMOL/L (ref 3.5–5.1)
POTASSIUM SERPL-SCNC: 3.7 MMOL/L (ref 3.5–5.1)
PROT SERPL-MCNC: 5.4 G/DL (ref 6.4–8.2)
SODIUM SERPL-SCNC: 134 MMOL/L (ref 136–145)

## 2022-05-27 PROCEDURE — 84132 ASSAY OF SERUM POTASSIUM: CPT | Performed by: HOSPITALIST

## 2022-05-27 PROCEDURE — 80053 COMPREHEN METABOLIC PANEL: CPT | Performed by: INTERNAL MEDICINE

## 2022-05-27 NOTE — PLAN OF CARE
Pt alert and oriented x4. VSS, afebrile. C/o abd pain and tenderness, prn pain medication given per MAR. Pt also c/o nausea a few times throughout the night, prn zofran given per MAR. Denies SOB. Ambulatory to the bathroom with assist. Fall precautions in place. Call light in reach. Will continue to monitor.

## 2022-05-27 NOTE — PLAN OF CARE
Continues to experience abdominal pain, states dull achy pain, has nausea, fair appetite, no emesis noted, denies any diarrhea, has had Dilaudid IV and Zofran this shift so far. K level is improved after replacement. Ambulated down the halls with , activity was tolerated.    Problem: GASTROINTESTINAL - ADULT  Goal: Minimal or absence of nausea and vomiting  Description: INTERVENTIONS:  - Maintain adequate hydration with IV or PO as ordered and tolerated  - Nasogastric tube to low intermittent suction as ordered  - Evaluate effectiveness of ordered antiemetic medications  - Provide nonpharmacologic comfort measures as appropriate  - Advance diet as tolerated, if ordered  - Obtain nutritional consult as needed  - Evaluate fluid balance  Outcome: Not Progressing     Problem: GASTROINTESTINAL - ADULT  Goal: Maintains or returns to baseline bowel function  Description: INTERVENTIONS:  - Assess bowel function  - Maintain adequate hydration with IV or PO as ordered and tolerated  - Evaluate effectiveness of GI medications  - Encourage mobilization and activity  - Obtain nutritional consult as needed  - Establish a toileting routine/schedule  - Consider collaborating with pharmacy to review patient's medication profile  Outcome: Progressing     Problem: PAIN - ADULT  Goal: Verbalizes/displays adequate comfort level or patient's stated pain goal  Description: INTERVENTIONS:  - Encourage pt to monitor pain and request assistance  - Assess pain using appropriate pain scale  - Administer analgesics based on type and severity of pain and evaluate response  - Implement non-pharmacological measures as appropriate and evaluate response  - Consider cultural and social influences on pain and pain management  - Manage/alleviate anxiety  - Utilize distraction and/or relaxation techniques  - Monitor for opioid side effects  - Notify MD/LIP if interventions unsuccessful or patient reports new pain  - Anticipate increased pain with activity and pre-medicate as appropriate  Outcome: Not Progressing

## 2022-05-28 VITALS
OXYGEN SATURATION: 98 % | RESPIRATION RATE: 18 BRPM | HEART RATE: 92 BPM | WEIGHT: 120 LBS | BODY MASS INDEX: 19 KG/M2 | SYSTOLIC BLOOD PRESSURE: 112 MMHG | TEMPERATURE: 98 F | DIASTOLIC BLOOD PRESSURE: 73 MMHG

## 2022-05-28 LAB
ANION GAP SERPL CALC-SCNC: 7 MMOL/L (ref 0–18)
BUN BLD-MCNC: 6 MG/DL (ref 7–18)
CALCIUM BLD-MCNC: 8.8 MG/DL (ref 8.5–10.1)
CHLORIDE SERPL-SCNC: 97 MMOL/L (ref 98–112)
CO2 SERPL-SCNC: 28 MMOL/L (ref 21–32)
CREAT BLD-MCNC: 0.88 MG/DL
GLUCOSE BLD-MCNC: 111 MG/DL (ref 70–99)
OSMOLALITY SERPL CALC.SUM OF ELEC: 272 MOSM/KG (ref 275–295)
POTASSIUM SERPL-SCNC: 3.6 MMOL/L (ref 3.5–5.1)
SODIUM SERPL-SCNC: 132 MMOL/L (ref 136–145)

## 2022-05-28 PROCEDURE — 80048 BASIC METABOLIC PNL TOTAL CA: CPT | Performed by: HOSPITALIST

## 2022-05-28 RX ORDER — OXYCODONE HYDROCHLORIDE 10 MG/1
10 TABLET ORAL EVERY 6 HOURS PRN
Qty: 30 TABLET | Refills: 0 | Status: SHIPPED | OUTPATIENT
Start: 2022-05-28

## 2022-05-28 RX ORDER — SPIRONOLACTONE 100 MG/1
100 TABLET, FILM COATED ORAL DAILY
Qty: 90 TABLET | Refills: 0 | Status: SHIPPED | OUTPATIENT
Start: 2022-05-28

## 2022-05-28 RX ORDER — POLYETHYLENE GLYCOL 3350 17 G/17G
17 POWDER, FOR SOLUTION ORAL DAILY PRN
Qty: 12 EACH | Refills: 3 | Status: SHIPPED | OUTPATIENT
Start: 2022-05-28

## 2022-05-28 RX ORDER — FUROSEMIDE 40 MG/1
40 TABLET ORAL DAILY
Qty: 90 TABLET | Refills: 0 | Status: SHIPPED | OUTPATIENT
Start: 2022-05-29

## 2022-05-28 RX ORDER — SENNA AND DOCUSATE SODIUM 50; 8.6 MG/1; MG/1
2 TABLET, FILM COATED ORAL DAILY
Qty: 60 TABLET | Refills: 3 | Status: SHIPPED | OUTPATIENT
Start: 2022-05-28

## 2022-05-28 NOTE — PLAN OF CARE
Patient A+Ox4. Continues to c/o abdominal pain 5/10, PRN dilaudid given. Fall precautions in place. Paracentesis site dry and intact. Plan to go home tomorrow.

## 2022-05-31 NOTE — PAYOR COMM NOTE
Discharge Notification    Patient Name: Sugar Peters  Payor: MARI HAGAN  Subscriber #: [id number]  Authorization Number: [id number]  Admit Date/Time: 5/22/2022 4:01 PM  Discharge Date/Time: 5/28/2022 6:42 PM

## 2022-06-29 ENCOUNTER — HOSPITAL ENCOUNTER (INPATIENT)
Facility: HOSPITAL | Age: 57
LOS: 3 days | Discharge: HOME | End: 2022-07-03
Attending: EMERGENCY MEDICINE | Admitting: HOSPITALIST
Payer: COMMERCIAL

## 2022-06-29 DIAGNOSIS — E87.1 HYPONATREMIA: ICD-10-CM

## 2022-06-29 DIAGNOSIS — R11.10 INTRACTABLE VOMITING: Primary | ICD-10-CM

## 2022-06-29 DIAGNOSIS — R79.89 ELEVATED PROCALCITONIN: ICD-10-CM

## 2022-06-29 DIAGNOSIS — R18.0 ASCITES, MALIGNANT: ICD-10-CM

## 2022-06-29 DIAGNOSIS — E87.6 HYPOKALEMIA: ICD-10-CM

## 2022-06-29 DIAGNOSIS — Z85.038 HISTORY OF COLON CANCER: ICD-10-CM

## 2022-06-29 DIAGNOSIS — E86.0 DEHYDRATION: ICD-10-CM

## 2022-06-29 DIAGNOSIS — R10.84 ABDOMINAL PAIN, GENERALIZED: ICD-10-CM

## 2022-06-29 DIAGNOSIS — R91.8 PULMONARY NODULES: ICD-10-CM

## 2022-06-29 LAB
ALBUMIN SERPL-MCNC: 3.1 G/DL (ref 3.4–5)
ALBUMIN/GLOB SERPL: 0.8 {RATIO} (ref 1–2)
ALP LIVER SERPL-CCNC: 128 U/L
ALT SERPL-CCNC: 33 U/L
ANION GAP SERPL CALC-SCNC: 15 MMOL/L (ref 0–18)
AST SERPL-CCNC: 51 U/L (ref 15–37)
BASOPHILS # BLD AUTO: 0.02 X10(3) UL (ref 0–0.2)
BASOPHILS NFR BLD AUTO: 0.2 %
BILIRUB SERPL-MCNC: 1.5 MG/DL (ref 0.1–2)
BUN BLD-MCNC: 13 MG/DL (ref 7–18)
CALCIUM BLD-MCNC: 9.9 MG/DL (ref 8.5–10.1)
CHLORIDE SERPL-SCNC: 86 MMOL/L (ref 98–112)
CO2 SERPL-SCNC: 29 MMOL/L (ref 21–32)
CREAT BLD-MCNC: 0.93 MG/DL
EOSINOPHIL # BLD AUTO: 0 X10(3) UL (ref 0–0.7)
EOSINOPHIL NFR BLD AUTO: 0 %
ERYTHROCYTE [DISTWIDTH] IN BLOOD BY AUTOMATED COUNT: 15.3 %
GLOBULIN PLAS-MCNC: 4 G/DL (ref 2.8–4.4)
GLUCOSE BLD-MCNC: 128 MG/DL (ref 70–99)
HCT VFR BLD AUTO: 37.2 %
HGB BLD-MCNC: 12.9 G/DL
IMM GRANULOCYTES # BLD AUTO: 0.04 X10(3) UL (ref 0–1)
IMM GRANULOCYTES NFR BLD: 0.3 %
LACTATE SERPL-SCNC: 1.4 MMOL/L (ref 0.4–2)
LIPASE SERPL-CCNC: 98 U/L (ref 73–393)
LYMPHOCYTES # BLD AUTO: 0.72 X10(3) UL (ref 1–4)
LYMPHOCYTES NFR BLD AUTO: 5.9 %
MCH RBC QN AUTO: 26.8 PG (ref 26–34)
MCHC RBC AUTO-ENTMCNC: 34.7 G/DL (ref 31–37)
MCV RBC AUTO: 77.3 FL
MONOCYTES # BLD AUTO: 0.47 X10(3) UL (ref 0.1–1)
MONOCYTES NFR BLD AUTO: 3.8 %
NEUTROPHILS # BLD AUTO: 11.03 X10 (3) UL (ref 1.5–7.7)
NEUTROPHILS # BLD AUTO: 11.03 X10(3) UL (ref 1.5–7.7)
NEUTROPHILS NFR BLD AUTO: 89.8 %
OSMOLALITY SERPL CALC.SUM OF ELEC: 272 MOSM/KG (ref 275–295)
PLATELET # BLD AUTO: 264 10(3)UL (ref 150–450)
POTASSIUM SERPL-SCNC: 2.8 MMOL/L (ref 3.5–5.1)
PROCALCITONIN SERPL-MCNC: 20.84 NG/ML (ref ?–0.16)
PROT SERPL-MCNC: 7.1 G/DL (ref 6.4–8.2)
RBC # BLD AUTO: 4.81 X10(6)UL
SARS-COV-2 RNA RESP QL NAA+PROBE: NOT DETECTED
SODIUM SERPL-SCNC: 130 MMOL/L (ref 136–145)
WBC # BLD AUTO: 12.3 X10(3) UL (ref 4–11)

## 2022-06-29 RX ORDER — POTASSIUM CHLORIDE 14.9 MG/ML
20 INJECTION INTRAVENOUS ONCE
Status: COMPLETED | OUTPATIENT
Start: 2022-06-29 | End: 2022-06-30

## 2022-06-29 RX ORDER — ONDANSETRON 2 MG/ML
4 INJECTION INTRAMUSCULAR; INTRAVENOUS ONCE
Status: COMPLETED | OUTPATIENT
Start: 2022-06-29 | End: 2022-06-29

## 2022-06-29 RX ORDER — HYDROMORPHONE HYDROCHLORIDE 1 MG/ML
2 INJECTION, SOLUTION INTRAMUSCULAR; INTRAVENOUS; SUBCUTANEOUS ONCE
Status: COMPLETED | OUTPATIENT
Start: 2022-06-29 | End: 2022-06-29

## 2022-06-29 RX ORDER — POTASSIUM CHLORIDE 1.5 G/1.77G
40 POWDER, FOR SOLUTION ORAL ONCE
Status: DISCONTINUED | OUTPATIENT
Start: 2022-06-29 | End: 2022-06-29

## 2022-06-30 ENCOUNTER — APPOINTMENT (OUTPATIENT)
Dept: CT IMAGING | Facility: HOSPITAL | Age: 57
End: 2022-06-30
Attending: EMERGENCY MEDICINE
Payer: COMMERCIAL

## 2022-06-30 PROBLEM — E87.6 HYPOKALEMIA: Status: ACTIVE | Noted: 2022-06-30

## 2022-06-30 PROBLEM — R79.89 ELEVATED PROCALCITONIN: Status: ACTIVE | Noted: 2022-01-01

## 2022-06-30 PROBLEM — E87.6 HYPOKALEMIA: Status: ACTIVE | Noted: 2022-01-01

## 2022-06-30 PROBLEM — R91.8 PULMONARY NODULES: Status: ACTIVE | Noted: 2022-06-30

## 2022-06-30 PROBLEM — Z85.038 HISTORY OF COLON CANCER: Status: ACTIVE | Noted: 2022-01-01

## 2022-06-30 PROBLEM — R10.84 ABDOMINAL PAIN, GENERALIZED: Status: ACTIVE | Noted: 2022-01-01

## 2022-06-30 PROBLEM — E86.0 DEHYDRATION: Status: ACTIVE | Noted: 2022-06-30

## 2022-06-30 PROBLEM — Z85.038 HISTORY OF COLON CANCER: Status: ACTIVE | Noted: 2022-06-30

## 2022-06-30 PROBLEM — R10.84 ABDOMINAL PAIN, GENERALIZED: Status: ACTIVE | Noted: 2022-06-30

## 2022-06-30 PROBLEM — R79.89 ELEVATED PROCALCITONIN: Status: ACTIVE | Noted: 2022-06-30

## 2022-06-30 PROBLEM — R91.8 PULMONARY NODULES: Status: ACTIVE | Noted: 2022-01-01

## 2022-06-30 PROBLEM — E86.0 DEHYDRATION: Status: ACTIVE | Noted: 2022-01-01

## 2022-06-30 LAB
ANION GAP SERPL CALC-SCNC: 6 MMOL/L (ref 0–18)
APTT PPP: 31.3 SECONDS (ref 23.3–35.6)
APTT PPP: >240 SECONDS (ref 23.3–35.6)
BASOPHILS # BLD AUTO: 0.01 X10(3) UL (ref 0–0.2)
BASOPHILS NFR BLD AUTO: 0.1 %
BILIRUB UR QL STRIP.AUTO: NEGATIVE
BUN BLD-MCNC: 9 MG/DL (ref 7–18)
CALCIUM BLD-MCNC: 8.5 MG/DL (ref 8.5–10.1)
CHLORIDE SERPL-SCNC: 96 MMOL/L (ref 98–112)
CLARITY UR REFRACT.AUTO: CLEAR
CO2 SERPL-SCNC: 31 MMOL/L (ref 21–32)
COLOR UR AUTO: YELLOW
CREAT BLD-MCNC: 0.88 MG/DL
EOSINOPHIL # BLD AUTO: 0.04 X10(3) UL (ref 0–0.7)
EOSINOPHIL NFR BLD AUTO: 0.6 %
ERYTHROCYTE [DISTWIDTH] IN BLOOD BY AUTOMATED COUNT: 15.4 %
GLUCOSE BLD-MCNC: 85 MG/DL (ref 70–99)
GLUCOSE UR STRIP.AUTO-MCNC: NEGATIVE MG/DL
HCT VFR BLD AUTO: 29.4 %
HGB BLD-MCNC: 9.7 G/DL
IMM GRANULOCYTES # BLD AUTO: 0.04 X10(3) UL (ref 0–1)
IMM GRANULOCYTES NFR BLD: 0.6 %
KETONES UR STRIP.AUTO-MCNC: 80 MG/DL
LEUKOCYTE ESTERASE UR QL STRIP.AUTO: NEGATIVE
LYMPHOCYTES # BLD AUTO: 1 X10(3) UL (ref 1–4)
LYMPHOCYTES NFR BLD AUTO: 14 %
MAGNESIUM SERPL-MCNC: 2.3 MG/DL (ref 1.6–2.6)
MCH RBC QN AUTO: 26.6 PG (ref 26–34)
MCHC RBC AUTO-ENTMCNC: 33 G/DL (ref 31–37)
MCV RBC AUTO: 80.8 FL
MONOCYTES # BLD AUTO: 0.63 X10(3) UL (ref 0.1–1)
MONOCYTES NFR BLD AUTO: 8.8 %
NEUTROPHILS # BLD AUTO: 5.4 X10 (3) UL (ref 1.5–7.7)
NEUTROPHILS # BLD AUTO: 5.4 X10(3) UL (ref 1.5–7.7)
NEUTROPHILS NFR BLD AUTO: 75.9 %
NITRITE UR QL STRIP.AUTO: NEGATIVE
OSMOLALITY SERPL CALC.SUM OF ELEC: 274 MOSM/KG (ref 275–295)
PH UR STRIP.AUTO: 6 [PH] (ref 5–8)
PLATELET # BLD AUTO: 183 10(3)UL (ref 150–450)
POTASSIUM SERPL-SCNC: 3.1 MMOL/L (ref 3.5–5.1)
PROT UR STRIP.AUTO-MCNC: NEGATIVE MG/DL
RBC # BLD AUTO: 3.64 X10(6)UL
RBC UR QL AUTO: NEGATIVE
SODIUM SERPL-SCNC: 133 MMOL/L (ref 136–145)
SP GR UR STRIP.AUTO: >1.03 (ref 1–1.03)
UROBILINOGEN UR STRIP.AUTO-MCNC: <2 MG/DL
WBC # BLD AUTO: 7.1 X10(3) UL (ref 4–11)

## 2022-06-30 PROCEDURE — 74177 CT ABD & PELVIS W/CONTRAST: CPT | Performed by: EMERGENCY MEDICINE

## 2022-06-30 RX ORDER — HYDROMORPHONE HYDROCHLORIDE 1 MG/ML
0.8 INJECTION, SOLUTION INTRAMUSCULAR; INTRAVENOUS; SUBCUTANEOUS EVERY 2 HOUR PRN
Status: DISCONTINUED | OUTPATIENT
Start: 2022-06-30 | End: 2022-07-03

## 2022-06-30 RX ORDER — POTASSIUM CHLORIDE 29.8 MG/ML
40 INJECTION INTRAVENOUS ONCE
Status: COMPLETED | OUTPATIENT
Start: 2022-06-30 | End: 2022-07-01

## 2022-06-30 RX ORDER — PROCHLORPERAZINE EDISYLATE 5 MG/ML
5 INJECTION INTRAMUSCULAR; INTRAVENOUS EVERY 8 HOURS PRN
Status: DISCONTINUED | OUTPATIENT
Start: 2022-06-30 | End: 2022-07-03

## 2022-06-30 RX ORDER — ONDANSETRON 2 MG/ML
4 INJECTION INTRAMUSCULAR; INTRAVENOUS EVERY 6 HOURS PRN
Status: DISCONTINUED | OUTPATIENT
Start: 2022-06-30 | End: 2022-07-03

## 2022-06-30 RX ORDER — BISACODYL 10 MG
10 SUPPOSITORY, RECTAL RECTAL
Status: DISCONTINUED | OUTPATIENT
Start: 2022-06-30 | End: 2022-07-03

## 2022-06-30 RX ORDER — MORPHINE SULFATE 2 MG/ML
2 INJECTION, SOLUTION INTRAMUSCULAR; INTRAVENOUS EVERY 2 HOUR PRN
Status: DISCONTINUED | OUTPATIENT
Start: 2022-06-30 | End: 2022-06-30

## 2022-06-30 RX ORDER — PANTOPRAZOLE SODIUM 40 MG/1
40 TABLET, DELAYED RELEASE ORAL
Status: DISCONTINUED | OUTPATIENT
Start: 2022-06-30 | End: 2022-07-03

## 2022-06-30 RX ORDER — SENNA AND DOCUSATE SODIUM 50; 8.6 MG/1; MG/1
2 TABLET, FILM COATED ORAL 2 TIMES DAILY
Status: DISCONTINUED | OUTPATIENT
Start: 2022-06-30 | End: 2022-07-03

## 2022-06-30 RX ORDER — POLYETHYLENE GLYCOL 3350 17 G/17G
17 POWDER, FOR SOLUTION ORAL DAILY PRN
Status: DISCONTINUED | OUTPATIENT
Start: 2022-06-30 | End: 2022-07-03

## 2022-06-30 RX ORDER — MORPHINE SULFATE 2 MG/ML
1 INJECTION, SOLUTION INTRAMUSCULAR; INTRAVENOUS EVERY 2 HOUR PRN
Status: DISCONTINUED | OUTPATIENT
Start: 2022-06-30 | End: 2022-06-30

## 2022-06-30 RX ORDER — MORPHINE SULFATE 4 MG/ML
4 INJECTION, SOLUTION INTRAMUSCULAR; INTRAVENOUS EVERY 2 HOUR PRN
Status: DISCONTINUED | OUTPATIENT
Start: 2022-06-30 | End: 2022-06-30

## 2022-06-30 RX ORDER — HEPARIN SODIUM 5000 [USP'U]/ML
5000 INJECTION, SOLUTION INTRAVENOUS; SUBCUTANEOUS EVERY 8 HOURS SCHEDULED
Status: DISCONTINUED | OUTPATIENT
Start: 2022-06-30 | End: 2022-06-30

## 2022-06-30 RX ORDER — IOHEXOL 350 MG/ML
65 INJECTION, SOLUTION INTRAVENOUS
Status: COMPLETED | OUTPATIENT
Start: 2022-06-30 | End: 2022-06-30

## 2022-06-30 RX ORDER — SODIUM CHLORIDE 9 MG/ML
INJECTION, SOLUTION INTRAVENOUS CONTINUOUS
Status: DISCONTINUED | OUTPATIENT
Start: 2022-06-30 | End: 2022-06-30

## 2022-06-30 RX ORDER — HYDROMORPHONE HYDROCHLORIDE 1 MG/ML
1.2 INJECTION, SOLUTION INTRAMUSCULAR; INTRAVENOUS; SUBCUTANEOUS EVERY 2 HOUR PRN
Status: DISCONTINUED | OUTPATIENT
Start: 2022-06-30 | End: 2022-07-03

## 2022-06-30 RX ORDER — POTASSIUM CHLORIDE 1.5 G/1.77G
40 POWDER, FOR SOLUTION ORAL EVERY 4 HOURS
Status: DISPENSED | OUTPATIENT
Start: 2022-06-30 | End: 2022-06-30

## 2022-06-30 RX ORDER — FUROSEMIDE 40 MG/1
40 TABLET ORAL DAILY
Status: DISCONTINUED | OUTPATIENT
Start: 2022-06-30 | End: 2022-07-03

## 2022-06-30 RX ORDER — HEPARIN SODIUM AND DEXTROSE 10000; 5 [USP'U]/100ML; G/100ML
INJECTION INTRAVENOUS CONTINUOUS
Status: DISCONTINUED | OUTPATIENT
Start: 2022-06-30 | End: 2022-07-03

## 2022-06-30 RX ORDER — HEPARIN SODIUM AND DEXTROSE 10000; 5 [USP'U]/100ML; G/100ML
18 INJECTION INTRAVENOUS ONCE
Status: COMPLETED | OUTPATIENT
Start: 2022-06-30 | End: 2022-06-30

## 2022-06-30 RX ORDER — SENNA AND DOCUSATE SODIUM 50; 8.6 MG/1; MG/1
2 TABLET, FILM COATED ORAL DAILY
Status: DISCONTINUED | OUTPATIENT
Start: 2022-06-30 | End: 2022-06-30

## 2022-06-30 RX ORDER — HYDROMORPHONE HYDROCHLORIDE 1 MG/ML
2 INJECTION, SOLUTION INTRAMUSCULAR; INTRAVENOUS; SUBCUTANEOUS ONCE
Status: COMPLETED | OUTPATIENT
Start: 2022-06-30 | End: 2022-06-30

## 2022-06-30 RX ORDER — HYDROMORPHONE HYDROCHLORIDE 1 MG/ML
0.4 INJECTION, SOLUTION INTRAMUSCULAR; INTRAVENOUS; SUBCUTANEOUS EVERY 2 HOUR PRN
Status: DISCONTINUED | OUTPATIENT
Start: 2022-06-30 | End: 2022-07-03

## 2022-06-30 RX ORDER — OXYCODONE HYDROCHLORIDE 10 MG/1
10 TABLET ORAL EVERY 6 HOURS PRN
Status: DISCONTINUED | OUTPATIENT
Start: 2022-06-30 | End: 2022-07-01

## 2022-06-30 RX ORDER — ACETAMINOPHEN 500 MG
500 TABLET ORAL EVERY 4 HOURS PRN
Status: DISCONTINUED | OUTPATIENT
Start: 2022-06-30 | End: 2022-07-03

## 2022-06-30 NOTE — PALLIATIVE CARE NOTE
Consult received for palliative care support with uncontrolled symptoms. Chart reviewed - noting workup and existing opioid orders and patient requirements. Will see for consult tomorrow. Message to primary MD and RN to review / inform.     Thank you for the referral.    GUMARO Mccarty  Palliative Care    6/30/2022  3:45 PM

## 2022-06-30 NOTE — PLAN OF CARE
NURSING ADMISSION NOTE      Patient admitted via cart. Oriented to room. Safety precautions initiated. Bed in low position. Call light in reach. Received pt alert and oriented x4. VSS. Afebrile. Pt c/o mild abdominal pain. Navigator and med rec completed. UA collected. MD paged and awaiting for orders. Safety precautions in place and call light within reach.

## 2022-06-30 NOTE — PLAN OF CARE
Patient A&Ox4, reported abdominal pain, pain medication given per MAR. US guided paracentesis ordered for 7/2. Holding PO eliquis, Hep gtt started @9 mL/hr per MAR. IV abx running per MAR, Right port intact. Vascular access consulted to start a PIV due to poor venous access. K=3.1, electrolyte protocol ordered and replaced per MAR. IVF discontinued. Advancing diet as tolerated, patient on full liquid. Palliative consulted for pain management & to discuss POC w/patient.        Problem: GASTROINTESTINAL - ADULT  Goal: Minimal or absence of nausea and vomiting  Description: INTERVENTIONS:  - Maintain adequate hydration with IV or PO as ordered and tolerated  - Nasogastric tube to low intermittent suction as ordered  - Evaluate effectiveness of ordered antiemetic medications  - Provide nonpharmacologic comfort measures as appropriate  - Advance diet as tolerated, if ordered  - Obtain nutritional consult as needed  - Evaluate fluid balance  6/30/2022 1532 by Loraine Diehl RN  Outcome: Progressing  6/30/2022 1532 by Loraine Diehl RN  Outcome: Not Progressing  Goal: Maintains or returns to baseline bowel function  Description: INTERVENTIONS:  - Assess bowel function  - Maintain adequate hydration with IV or PO as ordered and tolerated  - Evaluate effectiveness of GI medications  - Encourage mobilization and activity  - Obtain nutritional consult as needed  - Establish a toileting routine/schedule  - Consider collaborating with pharmacy to review patient's medication profile  6/30/2022 1532 by Loraine Diehl RN  Outcome: Not Progressing  6/30/2022 1532 by Loraine Diehl RN  Outcome: Not Progressing     Problem: HEMATOLOGIC - ADULT  Goal: Maintains hematologic stability  Description: INTERVENTIONS  - Assess for signs and symptoms of bleeding or hemorrhage  - Monitor labs and vital signs for trends  - Administer supportive blood products/factors, fluids and medications as ordered and appropriate  - Administer supportive blood products/factors as ordered and appropriate  6/30/2022 1532 by Sharri Wolfe RN  Outcome: Progressing  6/30/2022 1532 by Sharri Wolfe RN  Outcome: Progressing  Goal: Free from bleeding injury  Description: (Example usage: patient with low platelets)  INTERVENTIONS:  - Avoid intramuscular injections, enemas and rectal medication administration  - Ensure safe mobilization of patient  - Hold pressure on venipuncture sites to achieve adequate hemostasis  - Assess for signs and symptoms of internal bleeding  - Monitor lab trends  - Patient is to report abnormal signs of bleeding to staff  - Avoid use of toothpicks and dental floss  - Use electric shaver for shaving  - Use soft bristle tooth brush  - Limit straining and forceful nose blowing  6/30/2022 1532 by Sharri Wolfe RN  Outcome: Progressing  6/30/2022 1532 by Sharri Wolfe RN  Outcome: Progressing

## 2022-06-30 NOTE — ED INITIAL ASSESSMENT (HPI)
Patient arrives via EMS with c/o worsening abdominal pain, N/V, loose BM today. Hx colon cx. 50mcg fentanyl given PTA.

## 2022-06-30 NOTE — ED QUICK NOTES
Orders for admission, patient is aware of plan and ready to go upstairs.  Any questions, please call ED RN Lizzeth Brooks at extension [phone]     Patient Covid vaccination status: Fully vaccinated     COVID Test Ordered in ED: Rapid SARS-CoV-2 by PCR    COVID Suspicion at Admission: Low clinical suspicion for COVID    Running Infusions:  None    Mental Status/LOC at time of transport: ALERT X 4    Other pertinent information:  WITH ANIYAH CATH, CURRENTLY ON REMISSION FROM COLORECTAL CA. KNOWN TO HAVE CHRONIC ASCITES PER ERMD  CIWA score: N/A   NIH score:  N/A

## 2022-07-01 LAB
ALBUMIN SERPL-MCNC: 2.4 G/DL (ref 3.4–5)
ALBUMIN/GLOB SERPL: 0.8 {RATIO} (ref 1–2)
ALP LIVER SERPL-CCNC: 94 U/L
ALT SERPL-CCNC: 23 U/L
ANION GAP SERPL CALC-SCNC: 7 MMOL/L (ref 0–18)
APTT PPP: 109.7 SECONDS (ref 23.3–35.6)
APTT PPP: 52.9 SECONDS (ref 23.3–35.6)
APTT PPP: 76.5 SECONDS (ref 23.3–35.6)
AST SERPL-CCNC: 23 U/L (ref 15–37)
BASOPHILS # BLD AUTO: 0.02 X10(3) UL (ref 0–0.2)
BASOPHILS NFR BLD AUTO: 0.3 %
BILIRUB SERPL-MCNC: 0.6 MG/DL (ref 0.1–2)
BUN BLD-MCNC: 8 MG/DL (ref 7–18)
CALCIUM BLD-MCNC: 8.9 MG/DL (ref 8.5–10.1)
CHLORIDE SERPL-SCNC: 96 MMOL/L (ref 98–112)
CO2 SERPL-SCNC: 31 MMOL/L (ref 21–32)
CREAT BLD-MCNC: 0.93 MG/DL
EOSINOPHIL # BLD AUTO: 0.11 X10(3) UL (ref 0–0.7)
EOSINOPHIL NFR BLD AUTO: 1.9 %
ERYTHROCYTE [DISTWIDTH] IN BLOOD BY AUTOMATED COUNT: 15.7 %
GLOBULIN PLAS-MCNC: 2.9 G/DL (ref 2.8–4.4)
GLUCOSE BLD-MCNC: 87 MG/DL (ref 70–99)
HCT VFR BLD AUTO: 30 %
HGB BLD-MCNC: 9.8 G/DL
IMM GRANULOCYTES # BLD AUTO: 0.01 X10(3) UL (ref 0–1)
IMM GRANULOCYTES NFR BLD: 0.2 %
LYMPHOCYTES # BLD AUTO: 1.33 X10(3) UL (ref 1–4)
LYMPHOCYTES NFR BLD AUTO: 22.9 %
MCH RBC QN AUTO: 26.8 PG (ref 26–34)
MCHC RBC AUTO-ENTMCNC: 32.7 G/DL (ref 31–37)
MCV RBC AUTO: 82 FL
MONOCYTES # BLD AUTO: 0.64 X10(3) UL (ref 0.1–1)
MONOCYTES NFR BLD AUTO: 11 %
NEUTROPHILS # BLD AUTO: 3.7 X10 (3) UL (ref 1.5–7.7)
NEUTROPHILS # BLD AUTO: 3.7 X10(3) UL (ref 1.5–7.7)
NEUTROPHILS NFR BLD AUTO: 63.7 %
OSMOLALITY SERPL CALC.SUM OF ELEC: 276 MOSM/KG (ref 275–295)
PLATELET # BLD AUTO: 176 10(3)UL (ref 150–450)
POTASSIUM SERPL-SCNC: 3.8 MMOL/L (ref 3.5–5.1)
PROCALCITONIN SERPL-MCNC: 10.99 NG/ML (ref ?–0.16)
PROT SERPL-MCNC: 5.3 G/DL (ref 6.4–8.2)
RBC # BLD AUTO: 3.66 X10(6)UL
SODIUM SERPL-SCNC: 134 MMOL/L (ref 136–145)
WBC # BLD AUTO: 5.8 X10(3) UL (ref 4–11)

## 2022-07-01 PROCEDURE — 99253 IP/OBS CNSLTJ NEW/EST LOW 45: CPT | Performed by: INTERNAL MEDICINE

## 2022-07-01 RX ORDER — OXYCODONE HYDROCHLORIDE 10 MG/1
10 TABLET ORAL EVERY 4 HOURS PRN
Status: DISCONTINUED | OUTPATIENT
Start: 2022-07-01 | End: 2022-07-03

## 2022-07-01 RX ORDER — HEPARIN SODIUM 1000 [USP'U]/ML
30 INJECTION, SOLUTION INTRAVENOUS; SUBCUTANEOUS ONCE
Status: COMPLETED | OUTPATIENT
Start: 2022-07-01 | End: 2022-07-01

## 2022-07-01 NOTE — IMAGING NOTE
Spoke to [employer] and instructed to keep holding Eliquis for 48 hrs prior to paracentesis. Heparin drip also needs to be held 4 hours prior; INR tomorrow morning and CLear liquid  4 hours prior to paracentesis.

## 2022-07-01 NOTE — PLAN OF CARE
Received pt alert and oriented x4. VSS. Afebrile. Pt c/o pain and given PRN Dilaudid and Oxy with relief. Potassium replaced. Heparin drip infusing per protocol. IV Zosyn given per MAR orders. Pt offers no other complaints at this time. Safety precautions in place and call light within reach.

## 2022-07-02 ENCOUNTER — APPOINTMENT (OUTPATIENT)
Dept: ULTRASOUND IMAGING | Facility: HOSPITAL | Age: 57
End: 2022-07-02
Attending: HOSPITALIST
Payer: COMMERCIAL

## 2022-07-02 LAB
ALBUMIN SERPL-MCNC: 2.4 G/DL (ref 3.4–5)
ALBUMIN/GLOB SERPL: 0.7 {RATIO} (ref 1–2)
ALP LIVER SERPL-CCNC: 96 U/L
ALT SERPL-CCNC: 21 U/L
ANION GAP SERPL CALC-SCNC: 8 MMOL/L (ref 0–18)
APTT PPP: 74 SECONDS (ref 23.3–35.6)
AST SERPL-CCNC: 32 U/L (ref 15–37)
BASOPHILS # BLD AUTO: 0.03 X10(3) UL (ref 0–0.2)
BASOPHILS NFR BLD AUTO: 0.6 %
BASOPHILS NFR FLD: 0 %
BILIRUB SERPL-MCNC: 0.6 MG/DL (ref 0.1–2)
BUN BLD-MCNC: 7 MG/DL (ref 7–18)
CALCIUM BLD-MCNC: 8.8 MG/DL (ref 8.5–10.1)
CHLORIDE SERPL-SCNC: 93 MMOL/L (ref 98–112)
CO2 SERPL-SCNC: 30 MMOL/L (ref 21–32)
COLOR FLD: YELLOW
CREAT BLD-MCNC: 0.99 MG/DL
EOSINOPHIL # BLD AUTO: 0.16 X10(3) UL (ref 0–0.7)
EOSINOPHIL NFR BLD AUTO: 3 %
EOSINOPHIL NFR FLD: 0 %
ERYTHROCYTE [DISTWIDTH] IN BLOOD BY AUTOMATED COUNT: 15.9 %
GLOBULIN PLAS-MCNC: 3.4 G/DL (ref 2.8–4.4)
GLUCOSE BLD-MCNC: 103 MG/DL (ref 70–99)
HCT VFR BLD AUTO: 29.7 %
HGB BLD-MCNC: 10 G/DL
IMM GRANULOCYTES # BLD AUTO: 0.02 X10(3) UL (ref 0–1)
IMM GRANULOCYTES NFR BLD: 0.4 %
INR BLD: 1.02 (ref 0.8–1.2)
LYMPHOCYTES # BLD AUTO: 1.05 X10(3) UL (ref 1–4)
LYMPHOCYTES NFR BLD AUTO: 19.4 %
LYMPHOCYTES NFR FLD: 46 %
MCH RBC QN AUTO: 26.8 PG (ref 26–34)
MCHC RBC AUTO-ENTMCNC: 33.7 G/DL (ref 31–37)
MCV RBC AUTO: 79.6 FL
MONOCYTES # BLD AUTO: 0.59 X10(3) UL (ref 0.1–1)
MONOCYTES NFR BLD AUTO: 10.9 %
MONOS+MACROS NFR FLD: 46 %
NEUTROPHILS # BLD AUTO: 3.57 X10 (3) UL (ref 1.5–7.7)
NEUTROPHILS # BLD AUTO: 3.57 X10(3) UL (ref 1.5–7.7)
NEUTROPHILS NFR BLD AUTO: 65.7 %
NEUTROPHILS NFR FLD: 8 %
OSMOLALITY SERPL CALC.SUM OF ELEC: 270 MOSM/KG (ref 275–295)
PLASMA CELLS NFR FLD: 0 %
PLATELET # BLD AUTO: 164 10(3)UL (ref 150–450)
POTASSIUM SERPL-SCNC: 3.2 MMOL/L (ref 3.5–5.1)
PROT SERPL-MCNC: 5.8 G/DL (ref 6.4–8.2)
PROTHROMBIN TIME: 13.4 SECONDS (ref 11.6–14.8)
RBC # BLD AUTO: 3.73 X10(6)UL
RBC # FLD AUTO: <3000 /MM3
SODIUM SERPL-SCNC: 131 MMOL/L (ref 136–145)
TOTAL CELLS COUNTED FLD: 100
TURBIDITY CSF QL: CLEAR
WBC # BLD AUTO: 5.4 X10(3) UL (ref 4–11)
WBC # FLD AUTO: 116 /MM3

## 2022-07-02 PROCEDURE — 49083 ABD PARACENTESIS W/IMAGING: CPT | Performed by: HOSPITALIST

## 2022-07-02 PROCEDURE — 0W9G3ZX DRAINAGE OF PERITONEAL CAVITY, PERCUTANEOUS APPROACH, DIAGNOSTIC: ICD-10-PCS | Performed by: HOSPITALIST

## 2022-07-02 NOTE — PLAN OF CARE
Patient A+Ox4. Continues to have abd pain 4-6/10, PRN IV dilaudid and PO oxy given as needed. Fall precautions in place. Denies any nausea or vomiting. IB abt per MAR, afebrile. Went down for para 3.5L removed. Regular diet resumed after paracentesis.      Problem: GASTROINTESTINAL - ADULT  Goal: Minimal or absence of nausea and vomiting  Description: INTERVENTIONS:  - Maintain adequate hydration with IV or PO as ordered and tolerated  - Nasogastric tube to low intermittent suction as ordered  - Evaluate effectiveness of ordered antiemetic medications  - Provide nonpharmacologic comfort measures as appropriate  - Advance diet as tolerated, if ordered  - Obtain nutritional consult as needed  - Evaluate fluid balance  Outcome: Progressing  Goal: Maintains or returns to baseline bowel function  Description: INTERVENTIONS:  - Assess bowel function  - Maintain adequate hydration with IV or PO as ordered and tolerated  - Evaluate effectiveness of GI medications  - Encourage mobilization and activity  - Obtain nutritional consult as needed  - Establish a toileting routine/schedule  - Consider collaborating with pharmacy to review patient's medication profile  Outcome: Progressing     Problem: PAIN - ADULT  Goal: Verbalizes/displays adequate comfort level or patient's stated pain goal  Description: INTERVENTIONS:  - Encourage pt to monitor pain and request assistance  - Assess pain using appropriate pain scale  - Administer analgesics based on type and severity of pain and evaluate response  - Implement non-pharmacological measures as appropriate and evaluate response  - Consider cultural and social influences on pain and pain management  - Manage/alleviate anxiety  - Utilize distraction and/or relaxation techniques  - Monitor for opioid side effects  - Notify MD/LIP if interventions unsuccessful or patient reports new pain  - Anticipate increased pain with activity and pre-medicate as appropriate  Outcome: Progressing Problem: SAFETY ADULT - FALL  Goal: Free from fall injury  Description: INTERVENTIONS:  - Assess pt frequently for physical needs  - Identify cognitive and physical deficits and behaviors that affect risk of falls.   - [place] fall precautions as indicated by assessment.  - Educate pt/family on patient safety including physical limitations  - Instruct pt to call for assistance with activity based on assessment  - Modify environment to reduce risk of injury  - Provide assistive devices as appropriate  - Consider OT/PT consult to assist with strengthening/mobility  - Encourage toileting schedule  Outcome: Progressing

## 2022-07-02 NOTE — PLAN OF CARE
Problem: GASTROINTESTINAL - ADULT  Goal: Minimal or absence of nausea and vomiting  Description: INTERVENTIONS:  - Maintain adequate hydration with IV or PO as ordered and tolerated  -  Problem: PAIN - ADULT  Goal: Verbalizes/displays adequate comfort level or patient's stated pain goal  Description: INTERVENTIONS:  - Encourage pt to monitor pain and request assistance  - Assess pain using appropriate pain scale  - Administer analgesics based on type and severity of pain and evaluate response  - Implement non-pharmacological measures as appropriate and evaluate response  - Consider cultural and social influences on pain and pain management  - Manage/alleviate anxiety  - Utilize distraction and/or relaxation techniques  - Monitor for opioid side effects  - Notify MD/LIP if interventions unsuccessful or patient reports new pain  - Anticipate increased pain with activity and pre-medicate as appropriate  Outcome: Progressing   - Evaluate effectiveness of ordered antiemetic medications  - Provide nonpharmacologic comfort measures as appropriate  - Advance diet as tolerated, if ordered  - Obtain nutritional consult as needed  - Evaluate fluid balance  7/1/2022 2214 by Colten Ferrer RN  Outcome: Progressing  7/1/2022 2214 by Colten Ferrer RN  Outcome: Progressing  Goal: Maintains or returns to baseline bowel function  Description: INTERVENTIONS:  - Assess bowel function  - Maintain adequate hydration with IV or PO as ordered and tolerated  - Evaluate effectiveness of GI medications  - Encourage mobilization and activity  - Obtain nutritional consult as needed  - Establish a toileting routine/schedule  - Consider collaborating with pharmacy to review patient's medication profile  7/1/2022 2214 by Colten Ferrer RN  Outcome: Progressing  7/1/2022 2214 by Colten Ferrer RN  Outcome: Progressing     Assumed care at 1900 , patient on heparin  drip, eliquiz on hold , pt with hx of infrarenal IVC thrombosis, plan for paracentesis in am, received report from am shift to follow up with US dept in am to verify timing of the procedure and need to hold heparin for 4 hrs, discuss this with the patient during shift report, verbalized understanding. C/o severe to moderate abd pain, denies nausea, tolerate regular food, good relief from IV pain med, PO pain med offered for mild to moderate pain. Fall prec in place. Will cont  To monitor. 0500: c/o constipation, had laxatives at night  With good result.

## 2022-07-02 NOTE — PLAN OF CARE
Problem: GASTROINTESTINAL - ADULT  Goal: Minimal or absence of nausea and vomiting  Description: INTERVENTIONS:  - Maintain adequate hydration with IV or PO as ordered and tolerated  - Nasogastric tube to low intermittent suction as ordered  - Evaluate effectiveness of ordered antiemetic medications  - Provide nonpharmacologic comfort measures as appropriate  - Advance diet as tolerated, if ordered  - Obtain nutritional consult as needed  - Evaluate fluid balance  Outcome: Not Progressing     Problem: GASTROINTESTINAL - ADULT  Goal: Maintains or returns to baseline bowel function  Description: INTERVENTIONS:  - Assess bowel function  - Maintain adequate hydration with IV or PO as ordered and tolerated  - Evaluate effectiveness of GI medications  - Encourage mobilization and activity  - Obtain nutritional consult as needed  - Establish a toileting routine/schedule  - Consider collaborating with pharmacy to review patient's medication profile  Outcome: Not Progressing     Problem: GASTROINTESTINAL - ADULT  Goal: Minimal or absence of nausea and vomiting  Description: INTERVENTIONS:  - Maintain adequate hydration with IV or PO as ordered and tolerated  - Nasogastric tube to low intermittent suction as ordered  - Evaluate effectiveness of ordered antiemetic medications  - Provide nonpharmacologic comfort measures as appropriate  - Advance diet as tolerated, if ordered  - Obtain nutritional consult as needed  - Evaluate fluid balance  Outcome: Not Progressing   On heparin gtt at reg rates,Medicated for Pain w/ releif noted,On iv sozyn,on regular diet. For paracentesis tomorrow,heparin to be held 4hrs prior and ok for clear liquin 4 hrs prior. Has rt kaley cath w/ good blood return,Attended needs, at bedside and aware of plan of care.

## 2022-07-03 VITALS
DIASTOLIC BLOOD PRESSURE: 84 MMHG | TEMPERATURE: 98 F | OXYGEN SATURATION: 100 % | RESPIRATION RATE: 18 BRPM | BODY MASS INDEX: 17.95 KG/M2 | HEART RATE: 96 BPM | HEIGHT: 66 IN | WEIGHT: 111.69 LBS | SYSTOLIC BLOOD PRESSURE: 129 MMHG

## 2022-07-03 LAB
ALBUMIN SERPL-MCNC: 2.4 G/DL (ref 3.4–5)
ALBUMIN/GLOB SERPL: 0.7 {RATIO} (ref 1–2)
ALP LIVER SERPL-CCNC: 101 U/L
ALT SERPL-CCNC: 19 U/L
ANION GAP SERPL CALC-SCNC: 7 MMOL/L (ref 0–18)
APTT PPP: 66.1 SECONDS (ref 23.3–35.6)
AST SERPL-CCNC: 34 U/L (ref 15–37)
BASOPHILS # BLD AUTO: 0.03 X10(3) UL (ref 0–0.2)
BASOPHILS NFR BLD AUTO: 0.5 %
BILIRUB SERPL-MCNC: 0.5 MG/DL (ref 0.1–2)
BUN BLD-MCNC: 5 MG/DL (ref 7–18)
CALCIUM BLD-MCNC: 9.1 MG/DL (ref 8.5–10.1)
CHLORIDE SERPL-SCNC: 92 MMOL/L (ref 98–112)
CO2 SERPL-SCNC: 32 MMOL/L (ref 21–32)
CREAT BLD-MCNC: 0.91 MG/DL
EOSINOPHIL # BLD AUTO: 0.23 X10(3) UL (ref 0–0.7)
EOSINOPHIL NFR BLD AUTO: 3.9 %
ERYTHROCYTE [DISTWIDTH] IN BLOOD BY AUTOMATED COUNT: 15.9 %
GLOBULIN PLAS-MCNC: 3.5 G/DL (ref 2.8–4.4)
GLUCOSE BLD-MCNC: 97 MG/DL (ref 70–99)
HCT VFR BLD AUTO: 34.2 %
HGB BLD-MCNC: 11.2 G/DL
IMM GRANULOCYTES # BLD AUTO: 0.02 X10(3) UL (ref 0–1)
IMM GRANULOCYTES NFR BLD: 0.3 %
LYMPHOCYTES # BLD AUTO: 1.8 X10(3) UL (ref 1–4)
LYMPHOCYTES NFR BLD AUTO: 30.8 %
MCH RBC QN AUTO: 26.6 PG (ref 26–34)
MCHC RBC AUTO-ENTMCNC: 32.7 G/DL (ref 31–37)
MCV RBC AUTO: 81.2 FL
MONOCYTES # BLD AUTO: 0.58 X10(3) UL (ref 0.1–1)
MONOCYTES NFR BLD AUTO: 9.9 %
NEUTROPHILS # BLD AUTO: 3.18 X10 (3) UL (ref 1.5–7.7)
NEUTROPHILS # BLD AUTO: 3.18 X10(3) UL (ref 1.5–7.7)
NEUTROPHILS NFR BLD AUTO: 54.6 %
OSMOLALITY SERPL CALC.SUM OF ELEC: 269 MOSM/KG (ref 275–295)
PLATELET # BLD AUTO: 180 10(3)UL (ref 150–450)
POTASSIUM SERPL-SCNC: 3.2 MMOL/L (ref 3.5–5.1)
PROT SERPL-MCNC: 5.9 G/DL (ref 6.4–8.2)
RBC # BLD AUTO: 4.21 X10(6)UL
SODIUM SERPL-SCNC: 131 MMOL/L (ref 136–145)
WBC # BLD AUTO: 5.8 X10(3) UL (ref 4–11)

## 2022-07-03 RX ORDER — OXYCODONE HYDROCHLORIDE 10 MG/1
20 TABLET ORAL EVERY 4 HOURS PRN
Status: DISCONTINUED | OUTPATIENT
Start: 2022-07-03 | End: 2022-07-03

## 2022-07-03 RX ORDER — OXYCODONE HYDROCHLORIDE 10 MG/1
10 TABLET ORAL EVERY 4 HOURS PRN
Qty: 30 TABLET | Refills: 0 | Status: ON HOLD | OUTPATIENT
Start: 2022-07-03

## 2022-07-03 NOTE — PLAN OF CARE
Patient alert and oriented x 4, lungs clear on room air, abdomen rounded, bowel sounds present, took half of milk of magnesia and senokot doses, denies nausea and vomiting, denies pain at present, hep gtt infusing into extended IV site, TKVO to right PAC, dressing CDI. PTT therapeutic, next PTT in am, patient resting in bed with  at bedside. 01:50-Medicated for pain with dilaudid 0.4mg, rating pain 6/10.

## 2022-07-05 NOTE — PAYOR COMM NOTE
Discharge Notification    Patient Name: Dyan Tracey  Payor: MARI HAGAN  Subscriber #: [id number]  Authorization Number: [id number]  Admit Date/Time: 6/29/2022 9:38 PM  Discharge Date/Time: 7/3/2022 2:00 PM

## 2022-07-05 NOTE — CDS QUERY
Potential for Impaired Nutritional Status  DOCUMENTATION CLARIFICATION FORM  Dear Doctor:  Clinical information suggests potential for impaired nutritional status. For accurate ICD-10-CM code assignment to reflect severity of illness and risk of mortality,  PLEASE (X) ALL DIAGNOSES THAT APPLY. SELECTION BY PHYSICIAN ONLY      (  ) Mild Protein-Calorie Malnutrition  (  ) Moderate Protein-Calorie Malnutrition  (  ) Severe Protein-Calorie Malnutrition  (  ) Malnutrition of Unspecified Degree  (  ) Other (please specify):   (  ) Unable to Determine (please comment)          Dietary note:   pt meets severe malnutrition criteria;      CRITERIA FOR MALNUTRITION DIAGNOSIS:  Criteria for severe malnutrition diagnosis: chronic illness related to wt loss  greater than 7.5% in 3 months and muscle mass severe depletion    RD nutrition Care Plan- Encouraged small frequent meals with emphasis on high calorie/high protein, Monitor need for ONS (oral nutritional supplements), Ordered multivitamin and See RD nutrition assessment for additional    If you have any questions, please contact Clinical :  Marlen Kinsey RN, [address] [place] [address]  at #[phone]; Cell : [phone]. Thank You!     THIS FORM IS A PERMANENT PART OF THE MEDICAL RECORD

## 2022-07-07 LAB
NON GYNE INTERPRETATION: NEGATIVE
NON GYNE INTERPRETATION: NEGATIVE

## 2022-07-12 ENCOUNTER — ORDER TRANSCRIPTION (OUTPATIENT)
Dept: ADMINISTRATIVE | Facility: HOSPITAL | Age: 57
End: 2022-07-12

## 2022-07-12 DIAGNOSIS — C20 RECTAL CANCER (HCC): Primary | ICD-10-CM

## 2022-07-17 ENCOUNTER — APPOINTMENT (OUTPATIENT)
Dept: GENERAL RADIOLOGY | Facility: HOSPITAL | Age: 57
DRG: 435 | End: 2022-07-17
Attending: EMERGENCY MEDICINE
Payer: COMMERCIAL

## 2022-07-17 ENCOUNTER — HOSPITAL ENCOUNTER (INPATIENT)
Facility: HOSPITAL | Age: 57
LOS: 5 days | Discharge: HOME | End: 2022-07-22
Attending: EMERGENCY MEDICINE | Admitting: INTERNAL MEDICINE
Payer: COMMERCIAL

## 2022-07-17 ENCOUNTER — APPOINTMENT (OUTPATIENT)
Dept: CT IMAGING | Facility: HOSPITAL | Age: 57
End: 2022-07-17
Attending: EMERGENCY MEDICINE
Payer: COMMERCIAL

## 2022-07-17 ENCOUNTER — APPOINTMENT (OUTPATIENT)
Dept: GENERAL RADIOLOGY | Facility: HOSPITAL | Age: 57
End: 2022-07-17
Attending: EMERGENCY MEDICINE
Payer: COMMERCIAL

## 2022-07-17 ENCOUNTER — APPOINTMENT (OUTPATIENT)
Dept: CT IMAGING | Facility: HOSPITAL | Age: 57
DRG: 435 | End: 2022-07-17
Attending: EMERGENCY MEDICINE
Payer: COMMERCIAL

## 2022-07-17 ENCOUNTER — HOSPITAL ENCOUNTER (INPATIENT)
Facility: HOSPITAL | Age: 57
LOS: 5 days | Discharge: HOME | DRG: 435 | End: 2022-07-22
Attending: EMERGENCY MEDICINE | Admitting: INTERNAL MEDICINE
Payer: COMMERCIAL

## 2022-07-17 DIAGNOSIS — R53.1 WEAKNESS GENERALIZED: Primary | ICD-10-CM

## 2022-07-17 DIAGNOSIS — R63.0 ANOREXIA: ICD-10-CM

## 2022-07-17 DIAGNOSIS — R18.0 ASCITES, MALIGNANT: ICD-10-CM

## 2022-07-17 DIAGNOSIS — E87.1 HYPONATREMIA: ICD-10-CM

## 2022-07-17 DIAGNOSIS — C18.9 MALIGNANT NEOPLASM OF COLON, UNSPECIFIED PART OF COLON (HCC): ICD-10-CM

## 2022-07-17 LAB
ALBUMIN SERPL-MCNC: 2.6 G/DL (ref 3.4–5)
ALBUMIN/GLOB SERPL: 0.7 {RATIO} (ref 1–2)
ALP LIVER SERPL-CCNC: 110 U/L
ALT SERPL-CCNC: 16 U/L
AMMONIA PLAS-MCNC: <10 UMOL/L (ref 11–32)
ANION GAP SERPL CALC-SCNC: 6 MMOL/L (ref 0–18)
AST SERPL-CCNC: 39 U/L (ref 15–37)
BASOPHILS # BLD AUTO: 0.04 X10(3) UL (ref 0–0.2)
BASOPHILS NFR BLD AUTO: 0.4 %
BILIRUB SERPL-MCNC: 0.6 MG/DL (ref 0.1–2)
BILIRUB UR QL STRIP.AUTO: NEGATIVE
BUN BLD-MCNC: 12 MG/DL (ref 7–18)
CALCIUM BLD-MCNC: 9.9 MG/DL (ref 8.5–10.1)
CHLORIDE SERPL-SCNC: 91 MMOL/L (ref 98–112)
CLARITY UR REFRACT.AUTO: CLEAR
CO2 SERPL-SCNC: 32 MMOL/L (ref 21–32)
COLOR UR AUTO: YELLOW
CREAT BLD-MCNC: 0.93 MG/DL
EOSINOPHIL # BLD AUTO: 0.1 X10(3) UL (ref 0–0.7)
EOSINOPHIL NFR BLD AUTO: 1.1 %
ERYTHROCYTE [DISTWIDTH] IN BLOOD BY AUTOMATED COUNT: 16.8 %
GLOBULIN PLAS-MCNC: 3.7 G/DL (ref 2.8–4.4)
GLUCOSE BLD-MCNC: 101 MG/DL (ref 70–99)
GLUCOSE UR STRIP.AUTO-MCNC: NEGATIVE MG/DL
HCT VFR BLD AUTO: 37.2 %
HGB BLD-MCNC: 12.3 G/DL
IMM GRANULOCYTES # BLD AUTO: 0.04 X10(3) UL (ref 0–1)
IMM GRANULOCYTES NFR BLD: 0.4 %
KETONES UR STRIP.AUTO-MCNC: NEGATIVE MG/DL
LACTATE SERPL-SCNC: 1.9 MMOL/L (ref 0.4–2)
LEUKOCYTE ESTERASE UR QL STRIP.AUTO: NEGATIVE
LIPASE SERPL-CCNC: 81 U/L (ref 73–393)
LYMPHOCYTES # BLD AUTO: 0.95 X10(3) UL (ref 1–4)
LYMPHOCYTES NFR BLD AUTO: 10.5 %
MCH RBC QN AUTO: 27 PG (ref 26–34)
MCHC RBC AUTO-ENTMCNC: 33.1 G/DL (ref 31–37)
MCV RBC AUTO: 81.6 FL
MONOCYTES # BLD AUTO: 0.6 X10(3) UL (ref 0.1–1)
MONOCYTES NFR BLD AUTO: 6.6 %
NEUTROPHILS # BLD AUTO: 7.34 X10 (3) UL (ref 1.5–7.7)
NEUTROPHILS # BLD AUTO: 7.34 X10(3) UL (ref 1.5–7.7)
NEUTROPHILS NFR BLD AUTO: 81 %
NITRITE UR QL STRIP.AUTO: NEGATIVE
OSMOLALITY SERPL CALC.SUM OF ELEC: 268 MOSM/KG (ref 275–295)
PH UR STRIP.AUTO: 7 [PH] (ref 5–8)
PLATELET # BLD AUTO: 224 10(3)UL (ref 150–450)
POTASSIUM SERPL-SCNC: 3.9 MMOL/L (ref 3.5–5.1)
PROCALCITONIN SERPL-MCNC: 0.05 NG/ML (ref ?–0.16)
PROT SERPL-MCNC: 6.3 G/DL (ref 6.4–8.2)
PROT UR STRIP.AUTO-MCNC: NEGATIVE MG/DL
RBC # BLD AUTO: 4.56 X10(6)UL
RBC UR QL AUTO: NEGATIVE
SARS-COV-2 RNA RESP QL NAA+PROBE: NOT DETECTED
SODIUM SERPL-SCNC: 129 MMOL/L (ref 136–145)
SP GR UR STRIP.AUTO: 1.01 (ref 1–1.03)
UROBILINOGEN UR STRIP.AUTO-MCNC: 0.2 MG/DL
WBC # BLD AUTO: 9.1 X10(3) UL (ref 4–11)

## 2022-07-17 PROCEDURE — 99285 EMERGENCY DEPT VISIT HI MDM: CPT

## 2022-07-17 PROCEDURE — 74177 CT ABD & PELVIS W/CONTRAST: CPT | Performed by: EMERGENCY MEDICINE

## 2022-07-17 PROCEDURE — 81003 URINALYSIS AUTO W/O SCOPE: CPT | Performed by: EMERGENCY MEDICINE

## 2022-07-17 PROCEDURE — 83605 ASSAY OF LACTIC ACID: CPT | Performed by: EMERGENCY MEDICINE

## 2022-07-17 PROCEDURE — 80053 COMPREHEN METABOLIC PANEL: CPT | Performed by: EMERGENCY MEDICINE

## 2022-07-17 PROCEDURE — 87040 BLOOD CULTURE FOR BACTERIA: CPT | Performed by: EMERGENCY MEDICINE

## 2022-07-17 PROCEDURE — 85025 COMPLETE CBC W/AUTO DIFF WBC: CPT | Performed by: EMERGENCY MEDICINE

## 2022-07-17 PROCEDURE — 71045 X-RAY EXAM CHEST 1 VIEW: CPT | Performed by: EMERGENCY MEDICINE

## 2022-07-17 PROCEDURE — 84145 PROCALCITONIN (PCT): CPT | Performed by: EMERGENCY MEDICINE

## 2022-07-17 PROCEDURE — 96361 HYDRATE IV INFUSION ADD-ON: CPT

## 2022-07-17 PROCEDURE — 83690 ASSAY OF LIPASE: CPT | Performed by: EMERGENCY MEDICINE

## 2022-07-17 PROCEDURE — 36415 COLL VENOUS BLD VENIPUNCTURE: CPT

## 2022-07-17 PROCEDURE — 82140 ASSAY OF AMMONIA: CPT | Performed by: EMERGENCY MEDICINE

## 2022-07-17 PROCEDURE — 96374 THER/PROPH/DIAG INJ IV PUSH: CPT

## 2022-07-17 RX ORDER — PANTOPRAZOLE SODIUM 40 MG/1
40 TABLET, DELAYED RELEASE ORAL
Status: DISCONTINUED | OUTPATIENT
Start: 2022-07-18 | End: 2022-07-22

## 2022-07-17 RX ORDER — SODIUM CHLORIDE 9 MG/ML
INJECTION, SOLUTION INTRAVENOUS CONTINUOUS
Status: DISCONTINUED | OUTPATIENT
Start: 2022-07-17 | End: 2022-07-19

## 2022-07-17 RX ORDER — OXYCODONE HYDROCHLORIDE 10 MG/1
10 TABLET ORAL EVERY 4 HOURS PRN
Status: DISCONTINUED | OUTPATIENT
Start: 2022-07-17 | End: 2022-07-22

## 2022-07-17 RX ORDER — PROCHLORPERAZINE EDISYLATE 5 MG/ML
5 INJECTION INTRAMUSCULAR; INTRAVENOUS EVERY 8 HOURS PRN
Status: DISCONTINUED | OUTPATIENT
Start: 2022-07-17 | End: 2022-07-22

## 2022-07-17 RX ORDER — POLYETHYLENE GLYCOL 3350 17 G/17G
17 POWDER, FOR SOLUTION ORAL DAILY PRN
Status: DISCONTINUED | OUTPATIENT
Start: 2022-07-17 | End: 2022-07-22

## 2022-07-17 RX ORDER — HYDROMORPHONE HYDROCHLORIDE 1 MG/ML
0.5 INJECTION, SOLUTION INTRAMUSCULAR; INTRAVENOUS; SUBCUTANEOUS EVERY 30 MIN PRN
Status: COMPLETED | OUTPATIENT
Start: 2022-07-17 | End: 2022-07-19

## 2022-07-17 RX ORDER — SENNA AND DOCUSATE SODIUM 50; 8.6 MG/1; MG/1
1 TABLET, FILM COATED ORAL 2 TIMES DAILY
Status: DISCONTINUED | OUTPATIENT
Start: 2022-07-17 | End: 2022-07-22

## 2022-07-17 RX ORDER — ONDANSETRON 2 MG/ML
4 INJECTION INTRAMUSCULAR; INTRAVENOUS EVERY 6 HOURS PRN
Status: DISCONTINUED | OUTPATIENT
Start: 2022-07-17 | End: 2022-07-22

## 2022-07-17 RX ORDER — ACETAMINOPHEN 500 MG
500 TABLET ORAL EVERY 4 HOURS PRN
Status: DISCONTINUED | OUTPATIENT
Start: 2022-07-17 | End: 2022-07-20

## 2022-07-17 NOTE — ED INITIAL ASSESSMENT (HPI)
Patient relates ongoing dizziness and weakness that has been getting progressively worse since removal of cancerous lymph nodes in February. Patient also complains of right to mid upper abdominal pain. Currently denies N/V/D.

## 2022-07-18 LAB
ANION GAP SERPL CALC-SCNC: 5 MMOL/L (ref 0–18)
BASOPHILS # BLD AUTO: 0.04 X10(3) UL (ref 0–0.2)
BASOPHILS NFR BLD AUTO: 0.7 %
BUN BLD-MCNC: 10 MG/DL (ref 7–18)
CALCIUM BLD-MCNC: 8.5 MG/DL (ref 8.5–10.1)
CHLORIDE SERPL-SCNC: 99 MMOL/L (ref 98–112)
CO2 SERPL-SCNC: 28 MMOL/L (ref 21–32)
CREAT BLD-MCNC: 0.83 MG/DL
EOSINOPHIL # BLD AUTO: 0.25 X10(3) UL (ref 0–0.7)
EOSINOPHIL NFR BLD AUTO: 4.3 %
ERYTHROCYTE [DISTWIDTH] IN BLOOD BY AUTOMATED COUNT: 16.9 %
GLUCOSE BLD-MCNC: 88 MG/DL (ref 70–99)
HCT VFR BLD AUTO: 30.1 %
HGB BLD-MCNC: 10 G/DL
IMM GRANULOCYTES # BLD AUTO: 0.03 X10(3) UL (ref 0–1)
IMM GRANULOCYTES NFR BLD: 0.5 %
LYMPHOCYTES # BLD AUTO: 0.9 X10(3) UL (ref 1–4)
LYMPHOCYTES NFR BLD AUTO: 15.5 %
MAGNESIUM SERPL-MCNC: 2.1 MG/DL (ref 1.6–2.6)
MCH RBC QN AUTO: 27.3 PG (ref 26–34)
MCHC RBC AUTO-ENTMCNC: 33.2 G/DL (ref 31–37)
MCV RBC AUTO: 82.2 FL
MONOCYTES # BLD AUTO: 0.59 X10(3) UL (ref 0.1–1)
MONOCYTES NFR BLD AUTO: 10.2 %
NEUTROPHILS # BLD AUTO: 3.98 X10 (3) UL (ref 1.5–7.7)
NEUTROPHILS # BLD AUTO: 3.98 X10(3) UL (ref 1.5–7.7)
NEUTROPHILS NFR BLD AUTO: 68.8 %
OSMOLALITY SERPL CALC.SUM OF ELEC: 272 MOSM/KG (ref 275–295)
PLATELET # BLD AUTO: 178 10(3)UL (ref 150–450)
POTASSIUM SERPL-SCNC: 3.7 MMOL/L (ref 3.5–5.1)
RBC # BLD AUTO: 3.66 X10(6)UL
SODIUM SERPL-SCNC: 132 MMOL/L (ref 136–145)
WBC # BLD AUTO: 5.8 X10(3) UL (ref 4–11)

## 2022-07-18 PROCEDURE — 85025 COMPLETE CBC W/AUTO DIFF WBC: CPT | Performed by: INTERNAL MEDICINE

## 2022-07-18 PROCEDURE — 83735 ASSAY OF MAGNESIUM: CPT | Performed by: INTERNAL MEDICINE

## 2022-07-18 PROCEDURE — 97165 OT EVAL LOW COMPLEX 30 MIN: CPT

## 2022-07-18 PROCEDURE — 80048 BASIC METABOLIC PNL TOTAL CA: CPT | Performed by: INTERNAL MEDICINE

## 2022-07-18 PROCEDURE — 97161 PT EVAL LOW COMPLEX 20 MIN: CPT

## 2022-07-18 PROCEDURE — 97116 GAIT TRAINING THERAPY: CPT

## 2022-07-18 RX ORDER — MULTIPLE VITAMINS W/ MINERALS TAB 9MG-400MCG
1 TAB ORAL DAILY
Status: DISCONTINUED | OUTPATIENT
Start: 2022-07-18 | End: 2022-07-22

## 2022-07-18 RX ORDER — AZITHROMYCIN 250 MG/1
500 TABLET, FILM COATED ORAL
Status: DISCONTINUED | OUTPATIENT
Start: 2022-07-18 | End: 2022-07-22

## 2022-07-18 NOTE — ED QUICK NOTES
Report from Landon Jang RN. Pt resting in bed, family at bedside. States pain 5/10. IV Dilaudid given as ordered. Pt denies further need at this time.  Call light in reach

## 2022-07-18 NOTE — ED QUICK NOTES
Pt states pain better, 2/10. Pt informed that UA is needed, offered straight cath if unable to go, pt declined. States she will try to use the bathroom, ambulated, gait slow, steady. UA sent.

## 2022-07-18 NOTE — PROGRESS NOTES
NURSING ADMISSION NOTE      Patient admitted via Cart  Oriented to room. Safety precautions initiated. Bed in low position. Call light in reach. Admitted in rm 413 with dizziness and weakness. AOx4, VSS. IVF. meds given per [facility]. Complaint of pain. Pain medicine given. Tolerated well. Ambulatory with assistance until strength is back. Safety precautions in place. Will continue to monitor.

## 2022-07-18 NOTE — ED QUICK NOTES
Orders for admission, patient is aware of plan and ready to go upstairs.  Any questions, please call ED RN Emeka Tavera at extension [phone]    Patient Covid vaccination status: Fully vaccinated     COVID Test Ordered in ED: Rapid SARS-CoV-2 by PCR    COVID Suspicion at Admission: Low clinical suspicion for COVID    Running Infusions:  None    Mental Status/LOC at time of transport: A/Ox4    Other pertinent information:   CIWA score: N/A   NIH score:  N/A

## 2022-07-18 NOTE — PLAN OF CARE
Problem: PAIN - ADULT  Goal: Verbalizes/displays adequate comfort level or patient's stated pain goal  Description: INTERVENTIONS:  - Encourage pt to monitor pain and request assistance  - Assess pain using appropriate pain scale  - Administer analgesics based on type and severity of pain and evaluate response  - Implement non-pharmacological measures as appropriate and evaluate response  - Consider cultural and social influences on pain and pain management  - Manage/alleviate anxiety  - Utilize distraction and/or relaxation techniques  - Monitor for opioid side effects  - Notify MD/LIP if interventions unsuccessful or patient reports new pain  - Anticipate increased pain with activity and pre-medicate as appropriate  Outcome: Progressing   Intermittent mid abd pain. Oxycodone and Dilaudid given w/ relief reported. Problem: GASTROINTESTINAL - ADULT  Goal: Minimal or absence of nausea and vomiting  Description: INTERVENTIONS:  - Maintain adequate hydration with IV or PO as ordered and tolerated  - Nasogastric tube to low intermittent suction as ordered  - Evaluate effectiveness of ordered antiemetic medications  - Provide nonpharmacologic comfort measures as appropriate  - Advance diet as tolerated, if ordered  - Obtain nutritional consult as needed  - Evaluate fluid balance  Outcome: Progressing   Pt. Tolerating diet w/o nausea. No BM today. Reports small BM yesterday. Milk of Mag ordered PRN. Pt requests to take tonight. Order received from Dr. Ava Burton for [address] guided paracentesis and liver biopsy.      Problem: METABOLIC/FLUID AND ELECTROLYTES - ADULT  Goal: Electrolytes maintained within normal limits  Description: INTERVENTIONS:  - Monitor labs and rhythm and assess patient for signs and symptoms of electrolyte imbalances  - Administer electrolyte replacement as ordered  - Monitor response to electrolyte replacements, including rhythm and repeat lab results as appropriate  - Fluid restriction as ordered  - Instruct patient on fluid and nutrition restrictions as appropriate  Outcome: Progressing   Pt. Receiving IV Ns at 100 ml/hr Pt's  today. BUN 10, Cr 0.83, K 3.7. BP stable. Voiding w/o difficulty. Problem: HEMATOLOGIC - ADULT  Goal: Maintains hematologic stability  Description: INTERVENTIONS  - Assess for signs and symptoms of bleeding or hemorrhage  - Monitor labs and vital signs for trends  - Administer supportive blood products/factors, fluids and medications as ordered and appropriate  - Administer supportive blood products/factors as ordered and appropriate  Outcome: Progressing   No evidence of bleeding. Pt's Eliquis put on hold this am. Plans for US guided paracentesis and liver biopsy to be completed Wednesday. Per dept, Eliquis must be held x 48 hrs before biopsy.

## 2022-07-19 LAB
ANION GAP SERPL CALC-SCNC: 4 MMOL/L (ref 0–18)
BUN BLD-MCNC: 8 MG/DL (ref 7–18)
CALCIUM BLD-MCNC: 8.6 MG/DL (ref 8.5–10.1)
CHLORIDE SERPL-SCNC: 103 MMOL/L (ref 98–112)
CO2 SERPL-SCNC: 28 MMOL/L (ref 21–32)
CREAT BLD-MCNC: 0.75 MG/DL
GLUCOSE BLD-MCNC: 100 MG/DL (ref 70–99)
OSMOLALITY SERPL CALC.SUM OF ELEC: 278 MOSM/KG (ref 275–295)
POTASSIUM SERPL-SCNC: 3.7 MMOL/L (ref 3.5–5.1)
SODIUM SERPL-SCNC: 135 MMOL/L (ref 136–145)

## 2022-07-19 PROCEDURE — 80048 BASIC METABOLIC PNL TOTAL CA: CPT | Performed by: HOSPITALIST

## 2022-07-19 RX ORDER — FUROSEMIDE 40 MG/1
40 TABLET ORAL DAILY
Status: DISCONTINUED | OUTPATIENT
Start: 2022-07-19 | End: 2022-07-22

## 2022-07-19 RX ORDER — SPIRONOLACTONE 100 MG/1
100 TABLET, FILM COATED ORAL DAILY
Status: DISCONTINUED | OUTPATIENT
Start: 2022-07-19 | End: 2022-07-22

## 2022-07-19 NOTE — PLAN OF CARE
A/o x4. Room air. Vss. Afebrile. No bleeding noted. Prn pain med given as needed for abdominal pain with good relief. Prn milk of magnesia given for constipation. Slept well overnight.    Problem: GASTROINTESTINAL - ADULT  Goal: Minimal or absence of nausea and vomiting  Description: INTERVENTIONS:  - Maintain adequate hydration with IV or PO as ordered and tolerated  - Nasogastric tube to low intermittent suction as ordered  - Evaluate effectiveness of ordered antiemetic medications  - Provide nonpharmacologic comfort measures as appropriate  - Advance diet as tolerated, if ordered  - Obtain nutritional consult as needed  - Evaluate fluid balance  Outcome: Progressing

## 2022-07-19 NOTE — IMAGING NOTE
Called Rhonda, floor RN and instructions given to have labs drawn and NPO status. Pt is consentable and has working IV, port. Appt time given.

## 2022-07-19 NOTE — PLAN OF CARE
Alert and oriented, states she feels more bloated today. Abdomen is distended and firm, states has decreased urine output, was taking Lasix and Aldactone, on hold due to sodium level. Repeat BMP was drawn, Na is better, MD was notified, started on diuretics. IVF was discontinued. Pt is for paracentesis and liver biopsy tomorrow, NPO after midnight.    Problem: PAIN - ADULT  Goal: Verbalizes/displays adequate comfort level or patient's stated pain goal  Description: INTERVENTIONS:  - Encourage pt to monitor pain and request assistance  - Assess pain using appropriate pain scale  - Administer analgesics based on type and severity of pain and evaluate response  - Implement non-pharmacological measures as appropriate and evaluate response  - Consider cultural and social influences on pain and pain management  - Manage/alleviate anxiety  - Utilize distraction and/or relaxation techniques  - Monitor for opioid side effects  - Notify MD/LIP if interventions unsuccessful or patient reports new pain  - Anticipate increased pain with activity and pre-medicate as appropriate  Outcome: Progressing     Problem: GASTROINTESTINAL - ADULT  Goal: Minimal or absence of nausea and vomiting  Description: INTERVENTIONS:  - Maintain adequate hydration with IV or PO as ordered and tolerated  - Nasogastric tube to low intermittent suction as ordered  - Evaluate effectiveness of ordered antiemetic medications  - Provide nonpharmacologic comfort measures as appropriate  - Advance diet as tolerated, if ordered  - Obtain nutritional consult as needed  - Evaluate fluid balance  Outcome: Progressing     Problem: GASTROINTESTINAL - ADULT  Goal: Maintains or returns to baseline bowel function  Description: INTERVENTIONS:  - Assess bowel function  - Maintain adequate hydration with IV or PO as ordered and tolerated  - Evaluate effectiveness of GI medications  - Encourage mobilization and activity  - Obtain nutritional consult as needed  - Establish a toileting routine/schedule  - Consider collaborating with pharmacy to review patient's medication profile  Outcome: Progressing

## 2022-07-20 ENCOUNTER — APPOINTMENT (OUTPATIENT)
Dept: CT IMAGING | Facility: HOSPITAL | Age: 57
DRG: 435 | End: 2022-07-20
Attending: INTERNAL MEDICINE
Payer: COMMERCIAL

## 2022-07-20 ENCOUNTER — APPOINTMENT (OUTPATIENT)
Dept: ULTRASOUND IMAGING | Facility: HOSPITAL | Age: 57
End: 2022-07-20
Attending: INTERNAL MEDICINE
Payer: COMMERCIAL

## 2022-07-20 ENCOUNTER — APPOINTMENT (OUTPATIENT)
Dept: ULTRASOUND IMAGING | Facility: HOSPITAL | Age: 57
DRG: 435 | End: 2022-07-20
Attending: INTERNAL MEDICINE
Payer: COMMERCIAL

## 2022-07-20 ENCOUNTER — APPOINTMENT (OUTPATIENT)
Dept: CT IMAGING | Facility: HOSPITAL | Age: 57
End: 2022-07-20
Attending: INTERNAL MEDICINE
Payer: COMMERCIAL

## 2022-07-20 LAB
INR BLD: 1.03 (ref 0.8–1.2)
PROTHROMBIN TIME: 13.5 SECONDS (ref 11.6–14.8)

## 2022-07-20 PROCEDURE — 76705 ECHO EXAM OF ABDOMEN: CPT | Performed by: INTERNAL MEDICINE

## 2022-07-20 PROCEDURE — 88108 CYTOPATH CONCENTRATE TECH: CPT | Performed by: INTERNAL MEDICINE

## 2022-07-20 PROCEDURE — 88341 IMHCHEM/IMCYTCHM EA ADD ANTB: CPT | Performed by: INTERNAL MEDICINE

## 2022-07-20 PROCEDURE — 0FB03ZX EXCISION OF LIVER, PERCUTANEOUS APPROACH, DIAGNOSTIC: ICD-10-PCS | Performed by: RADIOLOGY

## 2022-07-20 PROCEDURE — 88342 IMHCHEM/IMCYTCHM 1ST ANTB: CPT | Performed by: INTERNAL MEDICINE

## 2022-07-20 PROCEDURE — 85610 PROTHROMBIN TIME: CPT | Performed by: HOSPITALIST

## 2022-07-20 PROCEDURE — 77012 CT SCAN FOR NEEDLE BIOPSY: CPT | Performed by: INTERNAL MEDICINE

## 2022-07-20 PROCEDURE — 99153 MOD SED SAME PHYS/QHP EA: CPT | Performed by: INTERNAL MEDICINE

## 2022-07-20 PROCEDURE — 88307 TISSUE EXAM BY PATHOLOGIST: CPT | Performed by: INTERNAL MEDICINE

## 2022-07-20 PROCEDURE — 0W9G3ZZ DRAINAGE OF PERITONEAL CAVITY, PERCUTANEOUS APPROACH: ICD-10-PCS | Performed by: RADIOLOGY

## 2022-07-20 PROCEDURE — 47000 NEEDLE BIOPSY OF LIVER PERQ: CPT | Performed by: INTERNAL MEDICINE

## 2022-07-20 PROCEDURE — 49083 ABD PARACENTESIS W/IMAGING: CPT | Performed by: INTERNAL MEDICINE

## 2022-07-20 PROCEDURE — 99152 MOD SED SAME PHYS/QHP 5/>YRS: CPT | Performed by: INTERNAL MEDICINE

## 2022-07-20 RX ORDER — FLUMAZENIL 0.1 MG/ML
INJECTION, SOLUTION INTRAVENOUS
Status: DISCONTINUED
Start: 2022-07-20 | End: 2022-07-20 | Stop reason: WASHOUT

## 2022-07-20 RX ORDER — NALOXONE HYDROCHLORIDE 0.4 MG/ML
80 INJECTION, SOLUTION INTRAMUSCULAR; INTRAVENOUS; SUBCUTANEOUS AS NEEDED
Status: DISCONTINUED | OUTPATIENT
Start: 2022-07-20 | End: 2022-07-20 | Stop reason: HOSPADM

## 2022-07-20 RX ORDER — LEVOFLOXACIN 500 MG/1
500 TABLET, FILM COATED ORAL DAILY
Qty: 4 TABLET | Refills: 0 | Status: SHIPPED | OUTPATIENT
Start: 2022-07-20 | End: 2022-07-22

## 2022-07-20 RX ORDER — FLUMAZENIL 0.1 MG/ML
0.2 INJECTION, SOLUTION INTRAVENOUS AS NEEDED
Status: DISCONTINUED | OUTPATIENT
Start: 2022-07-20 | End: 2022-07-20 | Stop reason: HOSPADM

## 2022-07-20 RX ORDER — HYDROMORPHONE HYDROCHLORIDE 1 MG/ML
0.5 INJECTION, SOLUTION INTRAMUSCULAR; INTRAVENOUS; SUBCUTANEOUS EVERY 4 HOURS PRN
Status: COMPLETED | OUTPATIENT
Start: 2022-07-20 | End: 2022-07-20

## 2022-07-20 RX ORDER — MIDAZOLAM HYDROCHLORIDE 1 MG/ML
INJECTION INTRAMUSCULAR; INTRAVENOUS
Status: COMPLETED
Start: 2022-07-20 | End: 2022-07-20

## 2022-07-20 RX ORDER — SODIUM CHLORIDE 9 MG/ML
INJECTION, SOLUTION INTRAVENOUS CONTINUOUS
Status: DISCONTINUED | OUTPATIENT
Start: 2022-07-20 | End: 2022-07-20 | Stop reason: HOSPADM

## 2022-07-20 RX ORDER — MIDAZOLAM HYDROCHLORIDE 1 MG/ML
1 INJECTION INTRAMUSCULAR; INTRAVENOUS EVERY 5 MIN PRN
Status: DISCONTINUED | OUTPATIENT
Start: 2022-07-20 | End: 2022-07-20 | Stop reason: HOSPADM

## 2022-07-20 RX ORDER — ACETAMINOPHEN 325 MG/1
650 TABLET ORAL EVERY 6 HOURS PRN
Status: DISCONTINUED | OUTPATIENT
Start: 2022-07-20 | End: 2022-07-22

## 2022-07-20 RX ORDER — NALOXONE HYDROCHLORIDE 0.4 MG/ML
INJECTION, SOLUTION INTRAMUSCULAR; INTRAVENOUS; SUBCUTANEOUS
Status: DISCONTINUED
Start: 2022-07-20 | End: 2022-07-20 | Stop reason: WASHOUT

## 2022-07-20 NOTE — IMAGING NOTE
Report called to AJIT-maite Ellis on r/a, dressing to right flank CDI. The pt presently denies pain and tolerated procedure well. May resume Eliquis tomorrow per Dr Kylah Gruber. Pt transported to [address] [facility] with Radiology RN at bedside.

## 2022-07-20 NOTE — IMAGING NOTE
Report called to AJIT-maite Ellis on r/a, 3.7L removed from LLQ and dressing CDI, presently denies pain and tolerated procedure well. The pt's liver biopsy will be done in CT around 1500 today. Pt transported back to [address] [facility] with  at bedside.

## 2022-07-20 NOTE — PROCEDURES
Jessenia Patient Status:  Inpatient    1965 MRN [id number]   [facility] 4NW-A Attending Eryn East MD   Hosp Day # 3 PCP July Bautista MD         Brief Procedure Report    Pre-Operative Diagnosis: Liver mass. Colon cancer    Post-Operative Diagnosis: Same as above. Procedure Performed: CT guided needle core biopsy of liver mass    Anesthesia: 1% lidocaine. Moderate sedation    EBL: 3cc    Complications: None    Summary of Case: 18g Needle core biopsy of segment 1 lesion obtained without immediate complication. Gel foam plug injected at periphery of liver prior to removing needle. Full report to follow in PACS.      Donovan Medina

## 2022-07-20 NOTE — PLAN OF CARE
Problem: PAIN - ADULT  Goal: Verbalizes/displays adequate comfort level or patient's stated pain goal  Description: INTERVENTIONS:  - Encourage pt to monitor pain and request assistance  - Assess pain using appropriate pain scale  - Administer analgesics based on type and severity of pain and evaluate response  - Implement non-pharmacological measures as appropriate and evaluate response  - Consider cultural and social influences on pain and pain management  - Manage/alleviate anxiety  - Utilize distraction and/or relaxation techniques  - Monitor for opioid side effects  - Notify MD/LIP if interventions unsuccessful or patient reports new pain  - Anticipate increased pain with activity and pre-medicate as appropriate  Outcome: Progressing     Problem: HEMATOLOGIC - ADULT  Goal: Maintains hematologic stability  Description: INTERVENTIONS  - Assess for signs and symptoms of bleeding or hemorrhage  - Monitor labs and vital signs for trends  - Administer supportive blood products/factors, fluids and medications as ordered and appropriate  - Administer supportive blood products/factors as ordered and appropriate  Outcome: Progressing     Alert and oriented, patient aware of POC, paracentesis and liver biopsy in am, NPO post midnight, c/o moderate abd pain, oxycodone as prn given with poor relief tonight, IV pain med given with good relief, Will monitor.

## 2022-07-20 NOTE — PROCEDURES
[facility]  Pre-Procedure Note    Name: Shasta Snider  MRN#: [id number]  : 1965    Procedure:  CT Guided Liver Needle Core Biopsy    Indication: Liver mass. Colon cancer history    Allergies:    No Known Allergies    Pertinent Medications:    Is patient on any Aspirin, Coumadin, or any other Anticoagulations/Antiplatelet medications? no    Adverse Reactions to Medication:  no    Mental Status:  Alert and Oriented      Health Status: Acceptable for Procedure    Impression and Plans:    Liver mass with history of colon cancer. Needle core biopsy requested. I have reviewed the above information prior to procedure. I have discussed the risks and benefits and alternatives with the patient/family. They understand and agree to proceed with plan of care.     Shavon Wilson MD  2022  3:15 PM

## 2022-07-20 NOTE — PLAN OF CARE
Had paracentesis done this morning, took off 3.7 liters of fluids, abdomen is softer and less distended, states she feels less bloated, still has pain, 7/10, requested IV Dilaudid. Returned to IR for liver biopsy, was kept NPO all day. 1700 returned from liver biopsy, groggy but arouses easily, had Versed and Fentanyl during procedure, no distress, oxygen saturations  are stable. Liver biopsy site to right flank with dressing which is dry and intact. No drainage noted, bed rest for 3 hours. Discharge orders from Dr Bina Fritz, updated that patient would like to stay overnight. Medicated for pain to biopsy site with IV, declined oral narcotics.    Problem: GASTROINTESTINAL - ADULT  Goal: Minimal or absence of nausea and vomiting  Description: INTERVENTIONS:  - Maintain adequate hydration with IV or PO as ordered and tolerated  - Nasogastric tube to low intermittent suction as ordered  - Evaluate effectiveness of ordered antiemetic medications  - Provide nonpharmacologic comfort measures as appropriate  - Advance diet as tolerated, if ordered  - Obtain nutritional consult as needed  - Evaluate fluid balance  Outcome: Progressing     Problem: PAIN - ADULT  Goal: Verbalizes/displays adequate comfort level or patient's stated pain goal  Description: INTERVENTIONS:  - Encourage pt to monitor pain and request assistance  - Assess pain using appropriate pain scale  - Administer analgesics based on type and severity of pain and evaluate response  - Implement non-pharmacological measures as appropriate and evaluate response  - Consider cultural and social influences on pain and pain management  - Manage/alleviate anxiety  - Utilize distraction and/or relaxation techniques  - Monitor for opioid side effects  - Notify MD/LIP if interventions unsuccessful or patient reports new pain  - Anticipate increased pain with activity and pre-medicate as appropriate  Outcome: Not Progressing

## 2022-07-21 LAB
ANION GAP SERPL CALC-SCNC: 6 MMOL/L (ref 0–18)
BUN BLD-MCNC: 7 MG/DL (ref 7–18)
CALCIUM BLD-MCNC: 8.8 MG/DL (ref 8.5–10.1)
CHLORIDE SERPL-SCNC: 100 MMOL/L (ref 98–112)
CO2 SERPL-SCNC: 30 MMOL/L (ref 21–32)
CREAT BLD-MCNC: 0.78 MG/DL
GLUCOSE BLD-MCNC: 91 MG/DL (ref 70–99)
OSMOLALITY SERPL CALC.SUM OF ELEC: 280 MOSM/KG (ref 275–295)
POTASSIUM SERPL-SCNC: 3.9 MMOL/L (ref 3.5–5.1)
SODIUM SERPL-SCNC: 136 MMOL/L (ref 136–145)

## 2022-07-21 PROCEDURE — 80048 BASIC METABOLIC PNL TOTAL CA: CPT | Performed by: HOSPITALIST

## 2022-07-21 RX ORDER — HYDROMORPHONE HYDROCHLORIDE 1 MG/ML
0.5 INJECTION, SOLUTION INTRAMUSCULAR; INTRAVENOUS; SUBCUTANEOUS EVERY 4 HOURS PRN
Status: DISCONTINUED | OUTPATIENT
Start: 2022-07-21 | End: 2022-07-22

## 2022-07-21 RX ORDER — GABAPENTIN 300 MG/1
300 CAPSULE ORAL ONCE
Status: COMPLETED | OUTPATIENT
Start: 2022-07-21 | End: 2022-07-21

## 2022-07-21 RX ORDER — GABAPENTIN 100 MG/1
100 CAPSULE ORAL 3 TIMES DAILY
Status: DISCONTINUED | OUTPATIENT
Start: 2022-07-21 | End: 2022-07-22

## 2022-07-21 NOTE — PLAN OF CARE
Problem: GASTROINTESTINAL - ADULT  Goal: Minimal or absence of nausea and vomiting  Description: INTERVENTIONS:  - Maintain adequate hydration with IV or PO as ordered and tolerated  - Nasogastric tube to low intermittent suction as ordered  - Evaluate effectiveness of ordered antiemetic medications  - Provide nonpharmacologic comfort measures as appropriate  - Advance diet as tolerated, if ordered  - Obtain nutritional consult as needed  - Evaluate fluid balance  Outcome: Progressing   Pt tolerating diet w/o complaints. Loose stool x 1 this am. Senna held. Abd soft, tender over RUQ. Reports some relief of pressure following paracentesis yesterday. Has had pain in the RUQ since the liver biopsy. Problem: METABOLIC/FLUID AND ELECTROLYTES - ADULT  Goal: Electrolytes maintained within normal limits  Description: INTERVENTIONS:  - Monitor labs and rhythm and assess patient for signs and symptoms of electrolyte imbalances  - Administer electrolyte replacement as ordered  - Monitor response to electrolyte replacements, including rhythm and repeat lab results as appropriate  - Fluid restriction as ordered  - Instruct patient on fluid and nutrition restrictions as appropriate  Outcome: Progressing   Pt receiving Lasix and aldactone. Good urine output. BUN 7, Cr 0.78, Na 136, K 3.9.        Problem: PAIN - ADULT  Goal: Verbalizes/displays adequate comfort level or patient's stated pain goal  Description: INTERVENTIONS:  - Encourage pt to monitor pain and request assistance  - Assess pain using appropriate pain scale  - Administer analgesics based on type and severity of pain and evaluate response  - Implement non-pharmacological measures as appropriate and evaluate response  - Consider cultural and social influences on pain and pain management  - Manage/alleviate anxiety  - Utilize distraction and/or relaxation techniques  - Monitor for opioid side effects  - Notify MD/LIP if interventions unsuccessful or patient reports new pain  - Anticipate increased pain with activity and pre-medicate as appropriate  Outcome: Progressing   Pt w/ pain in the RUQ of the abd since her liver biopsy. Reports it is intermittent. Sometimes brought on by movement other times no obvious trigger but it is abrupt like a spasm and stabbing in nature. Oxy IR given but pt required IV dilaudid for breakthrough pain. Dr. Luke Troncoso called and order received for gabapentin x 1. Dose given. Pt. Reports the gabapentin helped and looks more comfortable. Dr. Luke Troncoso notified for further orders. Pt. Getting up to chair/ BR independently. Gait steady. Reports \"I want to go home but I am concerned about my pain. I don't want to go home until I am sure I can manage the pain on the meds I have at home. \" Pt's  at the bedside, both pt and  encouraged to call w/ needs.

## 2022-07-21 NOTE — PLAN OF CARE
Problem: PAIN - ADULT  Goal: Verbalizes/displays adequate comfort level or patient's stated pain goal  Description: INTERVENTIONS:  - Encourage pt to monitor pain and request assistance  - Assess pain using appropriate pain scale  - Administer analgesics based on type and severity of pain and evaluate response  - Implement non-pharmacological measures as appropriate and evaluate response  - Consider cultural and social influences on pain and pain management  - Manage/alleviate anxiety  - Utilize distraction and/or relaxation techniques  - Monitor for opioid side effects  - Notify MD/LIP if interventions unsuccessful or patient reports new pain  - Anticipate increased pain with activity and pre-medicate as appropriate  Outcome: Progressing     Problem: GASTROINTESTINAL - ADULT  Goal: Minimal or absence of nausea and vomiting  Description: INTERVENTIONS:  - Maintain adequate hydration with IV or PO as ordered and tolerated  -- Provide nonpharmacologic comfort measures as appropriate  - Advance diet as tolerated, if ordered  - Obtain nutritional consult as needed  - Evaluate fluid balance  Outcome: Progressing     Abdomen less distended post paracentesis, ate approximately 50% of dinner,  Still  C/o  moderate abdominal  And biopsy site pain ,  Took PO pain med with only mild relief, requested IV pain med, MD notified with order. Patient aware that she is probably be discharged in am with oral pain med. Verbalized understanding.

## 2022-07-21 NOTE — CM/SW NOTE
Patient was discussed during nursing rounds and patient has no identified discharge planning needs at this time. SW will continue to follow for plan of care changes and remain available for any additional DC needs or concerns.      Marcel Villatoro MSW, LSW  Discharge Planner   [phone]

## 2022-07-22 VITALS
SYSTOLIC BLOOD PRESSURE: 102 MMHG | RESPIRATION RATE: 18 BRPM | DIASTOLIC BLOOD PRESSURE: 74 MMHG | TEMPERATURE: 99 F | HEART RATE: 98 BPM | BODY MASS INDEX: 19 KG/M2 | OXYGEN SATURATION: 94 % | WEIGHT: 115.81 LBS

## 2022-07-22 RX ORDER — OXYCODONE HYDROCHLORIDE 10 MG/1
10 TABLET ORAL EVERY 4 HOURS PRN
Qty: 20 TABLET | Refills: 0 | Status: ON HOLD | OUTPATIENT
Start: 2022-07-22 | End: 2022-08-03

## 2022-07-22 RX ORDER — GABAPENTIN 100 MG/1
100 CAPSULE ORAL 3 TIMES DAILY
Qty: 30 CAPSULE | Refills: 0 | Status: SHIPPED | OUTPATIENT
Start: 2022-07-22 | End: 2022-08-19 | Stop reason: CLARIF

## 2022-07-22 NOTE — PLAN OF CARE
Pt a/o x4. VSS on RA. C/o pain, pt requesting gabapentin. Dr. Corazon naqvi, new order for gabapentin received. Reports good pain relief. Meds given per MAR. Pt up to bathroom. Dressing sites C/D/I. Call light within reach. C/o continued pain, prns given per MAR.      Problem: PAIN - ADULT  Goal: Verbalizes/displays adequate comfort level or patient's stated pain goal  Description: INTERVENTIONS:  - Encourage pt to monitor pain and request assistance  - Assess pain using appropriate pain scale  - Administer analgesics based on type and severity of pain and evaluate response  - Implement non-pharmacological measures as appropriate and evaluate response  - Consider cultural and social influences on pain and pain management  - Manage/alleviate anxiety  - Utilize distraction and/or relaxation techniques  - Monitor for opioid side effects  - Notify MD/LIP if interventions unsuccessful or patient reports new pain  - Anticipate increased pain with activity and pre-medicate as appropriate  Outcome: Progressing     Problem: HEMATOLOGIC - ADULT  Goal: Free from bleeding injury  Description: (Example usage: patient with low platelets)  INTERVENTIONS:  - Avoid intramuscular injections, enemas and rectal medication administration  - Ensure safe mobilization of patient  - Hold pressure on venipuncture sites to achieve adequate hemostasis  - Assess for signs and symptoms of internal bleeding  - Monitor lab trends  - Patient is to report abnormal signs of bleeding to staff  - Avoid use of toothpicks and dental floss  - Use electric shaver for shaving  - Use soft bristle tooth brush  - Limit straining and forceful nose blowing  Outcome: Progressing     Problem: Impaired Functional Mobility  Goal: Achieve highest/safest level of mobility/gait  Description: Interventions:  - Assess patient's functional ability and stability  - Promote increasing activity/tolerance for mobility and gait  - Educate and engage patient/family in tolerated activity level and precautions  Outcome: Progressing

## 2022-07-22 NOTE — CDS QUERY
Uncertain Diagnosis  Simon Jay  Dear Dr. Rae Key,  Clinical information (provided below) indicates a documented diagnosis of pneumonia. For accurate ICD-10-CM code assignment,   PLEASE clarify the status of the diagnosis of pneumonia     [ x  ] Pneumonia is ruled out   [   ] Pneumonia is ruled in    [   ] Other (please specify):   [   ] Clinically Unable to Determine (please comment):       Documentation from the Medical Record:   7/17 procal- 0.05. RVP not done- no resp symptoms. Blood cultures no growth x 4 days. No elevation WBC  CXR Patchy infiltrate is seen in both lung bases. Consider pneumonia in the proper clinical setting. Treatment; IV anbx-completed 7/22 and described as empiric. H&P and Hospitalist Pn- Possible pneumonia(PNA) Exams -Lungs clear. Normal respiratory effort. Please clarify. For questions regarding this query, please contact Clinical :   Sameer Jimenez RN, BSN, [phone] or [email or web address]    Thank you.                                                              THIS FORM IS A PERMANENT PART OF THE MEDICAL RECORD

## 2022-07-22 NOTE — PROGRESS NOTES
Pt A&Ox4, VSS. Rates pain 7/10 in RLQ, reports she is still able to do ADLs and is tolerable. This RN provided emotional support and reassured. Discharge education provided on new meds, where to  prescription, and follow up appts. Pt and  verbalized understanding. Script for oxy given to pt. PIV removed with catheter intact and port deaccessed with no complications. Fall precautions maintained. Pt driven home by .

## 2022-07-24 ENCOUNTER — HOSPITAL ENCOUNTER (INPATIENT)
Facility: HOSPITAL | Age: 57
LOS: 7 days | Discharge: HOME | End: 2022-08-03
Attending: EMERGENCY MEDICINE | Admitting: INTERNAL MEDICINE
Payer: COMMERCIAL

## 2022-07-24 ENCOUNTER — APPOINTMENT (OUTPATIENT)
Dept: CT IMAGING | Facility: HOSPITAL | Age: 57
End: 2022-07-24
Attending: EMERGENCY MEDICINE
Payer: COMMERCIAL

## 2022-07-24 DIAGNOSIS — R53.1 WEAKNESS GENERALIZED: ICD-10-CM

## 2022-07-24 DIAGNOSIS — R11.2 NAUSEA AND VOMITING, UNSPECIFIED VOMITING TYPE: Primary | ICD-10-CM

## 2022-07-24 LAB
ALBUMIN SERPL-MCNC: 2.4 G/DL (ref 3.4–5)
ALBUMIN/GLOB SERPL: 0.6 {RATIO} (ref 1–2)
ALP LIVER SERPL-CCNC: 126 U/L
ALT SERPL-CCNC: 18 U/L
ANION GAP SERPL CALC-SCNC: 8 MMOL/L (ref 0–18)
AST SERPL-CCNC: 48 U/L (ref 15–37)
BASOPHILS # BLD AUTO: 0.03 X10(3) UL (ref 0–0.2)
BASOPHILS NFR BLD AUTO: 0.3 %
BILIRUB SERPL-MCNC: 0.6 MG/DL (ref 0.1–2)
BUN BLD-MCNC: 6 MG/DL (ref 7–18)
CALCIUM BLD-MCNC: 9.3 MG/DL (ref 8.5–10.1)
CHLORIDE SERPL-SCNC: 92 MMOL/L (ref 98–112)
CO2 SERPL-SCNC: 30 MMOL/L (ref 21–32)
CREAT BLD-MCNC: 0.88 MG/DL
EOSINOPHIL # BLD AUTO: 0.13 X10(3) UL (ref 0–0.7)
EOSINOPHIL NFR BLD AUTO: 1.4 %
ERYTHROCYTE [DISTWIDTH] IN BLOOD BY AUTOMATED COUNT: 16.2 %
GLOBULIN PLAS-MCNC: 3.8 G/DL (ref 2.8–4.4)
GLUCOSE BLD-MCNC: 113 MG/DL (ref 70–99)
HCT VFR BLD AUTO: 34 %
HGB BLD-MCNC: 11.5 G/DL
IMM GRANULOCYTES # BLD AUTO: 0.04 X10(3) UL (ref 0–1)
IMM GRANULOCYTES NFR BLD: 0.4 %
LIPASE SERPL-CCNC: 119 U/L (ref 73–393)
LYMPHOCYTES # BLD AUTO: 0.79 X10(3) UL (ref 1–4)
LYMPHOCYTES NFR BLD AUTO: 8.6 %
MCH RBC QN AUTO: 27.1 PG (ref 26–34)
MCHC RBC AUTO-ENTMCNC: 33.8 G/DL (ref 31–37)
MCV RBC AUTO: 80 FL
MONOCYTES # BLD AUTO: 0.75 X10(3) UL (ref 0.1–1)
MONOCYTES NFR BLD AUTO: 8.1 %
NEUTROPHILS # BLD AUTO: 7.49 X10 (3) UL (ref 1.5–7.7)
NEUTROPHILS # BLD AUTO: 7.49 X10(3) UL (ref 1.5–7.7)
NEUTROPHILS NFR BLD AUTO: 81.2 %
OSMOLALITY SERPL CALC.SUM OF ELEC: 268 MOSM/KG (ref 275–295)
PLATELET # BLD AUTO: 296 10(3)UL (ref 150–450)
POTASSIUM SERPL-SCNC: 3.4 MMOL/L (ref 3.5–5.1)
PROT SERPL-MCNC: 6.2 G/DL (ref 6.4–8.2)
RBC # BLD AUTO: 4.25 X10(6)UL
SARS-COV-2 RNA RESP QL NAA+PROBE: NOT DETECTED
SODIUM SERPL-SCNC: 130 MMOL/L (ref 136–145)
TROPONIN I HIGH SENSITIVITY: 6 NG/L
WBC # BLD AUTO: 9.2 X10(3) UL (ref 4–11)

## 2022-07-24 PROCEDURE — 96361 HYDRATE IV INFUSION ADD-ON: CPT

## 2022-07-24 PROCEDURE — 99285 EMERGENCY DEPT VISIT HI MDM: CPT

## 2022-07-24 PROCEDURE — 85025 COMPLETE CBC W/AUTO DIFF WBC: CPT | Performed by: EMERGENCY MEDICINE

## 2022-07-24 PROCEDURE — 96376 TX/PRO/DX INJ SAME DRUG ADON: CPT

## 2022-07-24 PROCEDURE — 83690 ASSAY OF LIPASE: CPT | Performed by: EMERGENCY MEDICINE

## 2022-07-24 PROCEDURE — 80053 COMPREHEN METABOLIC PANEL: CPT | Performed by: EMERGENCY MEDICINE

## 2022-07-24 PROCEDURE — 96375 TX/PRO/DX INJ NEW DRUG ADDON: CPT

## 2022-07-24 PROCEDURE — 84484 ASSAY OF TROPONIN QUANT: CPT | Performed by: EMERGENCY MEDICINE

## 2022-07-24 PROCEDURE — 93005 ELECTROCARDIOGRAM TRACING: CPT

## 2022-07-24 PROCEDURE — 96365 THER/PROPH/DIAG IV INF INIT: CPT

## 2022-07-24 PROCEDURE — 74176 CT ABD & PELVIS W/O CONTRAST: CPT | Performed by: EMERGENCY MEDICINE

## 2022-07-24 RX ORDER — ONDANSETRON 2 MG/ML
4 INJECTION INTRAMUSCULAR; INTRAVENOUS ONCE
Status: COMPLETED | OUTPATIENT
Start: 2022-07-24 | End: 2022-07-24

## 2022-07-24 RX ORDER — HYDROMORPHONE HYDROCHLORIDE 1 MG/ML
0.5 INJECTION, SOLUTION INTRAMUSCULAR; INTRAVENOUS; SUBCUTANEOUS ONCE
Status: COMPLETED | OUTPATIENT
Start: 2022-07-24 | End: 2022-07-24

## 2022-07-24 RX ORDER — SODIUM CHLORIDE 9 MG/ML
1000 INJECTION, SOLUTION INTRAVENOUS CONTINUOUS
Status: ACTIVE | OUTPATIENT
Start: 2022-07-24 | End: 2022-07-25

## 2022-07-24 RX ORDER — POTASSIUM CHLORIDE 14.9 MG/ML
20 INJECTION INTRAVENOUS ONCE
Status: COMPLETED | OUTPATIENT
Start: 2022-07-24 | End: 2022-07-25

## 2022-07-25 ENCOUNTER — APPOINTMENT (OUTPATIENT)
Dept: ULTRASOUND IMAGING | Facility: HOSPITAL | Age: 57
End: 2022-07-25
Attending: INTERNAL MEDICINE
Payer: COMMERCIAL

## 2022-07-25 PROBLEM — R11.2 NAUSEA AND VOMITING, UNSPECIFIED VOMITING TYPE: Status: ACTIVE | Noted: 2022-01-01

## 2022-07-25 PROBLEM — R11.2 NAUSEA AND VOMITING, UNSPECIFIED VOMITING TYPE: Status: ACTIVE | Noted: 2022-07-25

## 2022-07-25 LAB
ATRIAL RATE: 108 BPM
BASOPHILS NFR PRT: 0 %
BILIRUB UR QL CFM: NEGATIVE
CLARITY UR REFRACT.AUTO: CLEAR
COLOR UR AUTO: YELLOW
EOSINOPHIL NFR PRT: 0 %
GLUCOSE UR STRIP.AUTO-MCNC: NEGATIVE MG/DL
INR BLD: 1.05 (ref 0.8–1.2)
KETONES UR STRIP.AUTO-MCNC: 15 MG/DL
LEUKOCYTE ESTERASE UR QL STRIP.AUTO: NEGATIVE
LYMPHOCYTES NFR PRT: 75 %
MONOS+MACROS NFR PRT: 13 %
NEUTROPHILS PERITONEAL FLUID: 12 %
NITRITE UR QL STRIP.AUTO: NEGATIVE
P AXIS: 73 DEGREES
P-R INTERVAL: 136 MS
PH UR STRIP.AUTO: 6.5 [PH] (ref 5–8)
POTASSIUM SERPL-SCNC: 4.3 MMOL/L (ref 3.5–5.1)
PROTHROMBIN TIME: 13.7 SECONDS (ref 11.6–14.8)
Q-T INTERVAL: 352 MS
QRS DURATION: 86 MS
QTC CALCULATION (BEZET): 471 MS
R AXIS: 87 DEGREES
RBC PERITONEAL FLUID: NORMAL /MM3
SP GR UR STRIP.AUTO: 1.02 (ref 1–1.03)
T AXIS: 71 DEGREES
TOTAL CELLS COUNTED FLD: 100
UROBILINOGEN UR STRIP.AUTO-MCNC: 0.2 MG/DL
VENTRICULAR RATE: 108 BPM
WBC # PRT: 344 /MM3

## 2022-07-25 PROCEDURE — 49083 ABD PARACENTESIS W/IMAGING: CPT | Performed by: INTERNAL MEDICINE

## 2022-07-25 PROCEDURE — 88305 TISSUE EXAM BY PATHOLOGIST: CPT | Performed by: INTERNAL MEDICINE

## 2022-07-25 PROCEDURE — 85610 PROTHROMBIN TIME: CPT | Performed by: INTERNAL MEDICINE

## 2022-07-25 PROCEDURE — 88341 IMHCHEM/IMCYTCHM EA ADD ANTB: CPT | Performed by: INTERNAL MEDICINE

## 2022-07-25 PROCEDURE — 88108 CYTOPATH CONCENTRATE TECH: CPT | Performed by: INTERNAL MEDICINE

## 2022-07-25 PROCEDURE — 89051 BODY FLUID CELL COUNT: CPT | Performed by: INTERNAL MEDICINE

## 2022-07-25 PROCEDURE — 89050 BODY FLUID CELL COUNT: CPT | Performed by: INTERNAL MEDICINE

## 2022-07-25 PROCEDURE — 88342 IMHCHEM/IMCYTCHM 1ST ANTB: CPT | Performed by: INTERNAL MEDICINE

## 2022-07-25 PROCEDURE — 81001 URINALYSIS AUTO W/SCOPE: CPT | Performed by: EMERGENCY MEDICINE

## 2022-07-25 PROCEDURE — 84132 ASSAY OF SERUM POTASSIUM: CPT | Performed by: HOSPITALIST

## 2022-07-25 RX ORDER — GABAPENTIN 100 MG/1
100 CAPSULE ORAL 3 TIMES DAILY
Status: DISCONTINUED | OUTPATIENT
Start: 2022-07-25 | End: 2022-07-25

## 2022-07-25 RX ORDER — HYDROMORPHONE HYDROCHLORIDE 1 MG/ML
0.8 INJECTION, SOLUTION INTRAMUSCULAR; INTRAVENOUS; SUBCUTANEOUS EVERY 4 HOURS PRN
Status: DISCONTINUED | OUTPATIENT
Start: 2022-07-25 | End: 2022-07-26

## 2022-07-25 RX ORDER — FUROSEMIDE 40 MG/1
40 TABLET ORAL DAILY
Status: DISCONTINUED | OUTPATIENT
Start: 2022-07-25 | End: 2022-07-25

## 2022-07-25 RX ORDER — PANTOPRAZOLE SODIUM 40 MG/1
40 TABLET, DELAYED RELEASE ORAL
Status: DISCONTINUED | OUTPATIENT
Start: 2022-07-25 | End: 2022-08-03

## 2022-07-25 RX ORDER — BISACODYL 10 MG
10 SUPPOSITORY, RECTAL RECTAL
Status: DISCONTINUED | OUTPATIENT
Start: 2022-07-25 | End: 2022-07-25

## 2022-07-25 RX ORDER — PROCHLORPERAZINE EDISYLATE 5 MG/ML
5 INJECTION INTRAMUSCULAR; INTRAVENOUS EVERY 8 HOURS PRN
Status: DISCONTINUED | OUTPATIENT
Start: 2022-07-25 | End: 2022-08-03

## 2022-07-25 RX ORDER — HYDROMORPHONE HYDROCHLORIDE 1 MG/ML
0.2 INJECTION, SOLUTION INTRAMUSCULAR; INTRAVENOUS; SUBCUTANEOUS EVERY 2 HOUR PRN
Status: DISCONTINUED | OUTPATIENT
Start: 2022-07-25 | End: 2022-07-25

## 2022-07-25 RX ORDER — HYDROMORPHONE HYDROCHLORIDE 1 MG/ML
0.4 INJECTION, SOLUTION INTRAMUSCULAR; INTRAVENOUS; SUBCUTANEOUS EVERY 2 HOUR PRN
Status: DISCONTINUED | OUTPATIENT
Start: 2022-07-25 | End: 2022-07-25

## 2022-07-25 RX ORDER — OXYCODONE HYDROCHLORIDE 10 MG/1
10 TABLET ORAL EVERY 4 HOURS PRN
Status: DISCONTINUED | OUTPATIENT
Start: 2022-07-25 | End: 2022-08-03

## 2022-07-25 RX ORDER — POLYETHYLENE GLYCOL 3350 17 G/17G
17 POWDER, FOR SOLUTION ORAL DAILY PRN
Status: DISCONTINUED | OUTPATIENT
Start: 2022-07-25 | End: 2022-08-03

## 2022-07-25 RX ORDER — HYDROMORPHONE HYDROCHLORIDE 1 MG/ML
INJECTION, SOLUTION INTRAMUSCULAR; INTRAVENOUS; SUBCUTANEOUS
Status: COMPLETED
Start: 2022-07-25 | End: 2022-07-25

## 2022-07-25 RX ORDER — SODIUM CHLORIDE 9 MG/ML
INJECTION, SOLUTION INTRAVENOUS CONTINUOUS
Status: DISCONTINUED | OUTPATIENT
Start: 2022-07-25 | End: 2022-07-26

## 2022-07-25 RX ORDER — DOCUSATE SODIUM 100 MG/1
100 CAPSULE, LIQUID FILLED ORAL 2 TIMES DAILY
Status: DISCONTINUED | OUTPATIENT
Start: 2022-07-25 | End: 2022-08-03

## 2022-07-25 RX ORDER — ONDANSETRON 2 MG/ML
4 INJECTION INTRAMUSCULAR; INTRAVENOUS EVERY 6 HOURS PRN
Status: DISCONTINUED | OUTPATIENT
Start: 2022-07-25 | End: 2022-08-03

## 2022-07-25 RX ORDER — ACETAMINOPHEN 500 MG
500 TABLET ORAL EVERY 4 HOURS PRN
Status: DISCONTINUED | OUTPATIENT
Start: 2022-07-25 | End: 2022-08-03

## 2022-07-25 RX ORDER — SODIUM PHOSPHATE, DIBASIC AND SODIUM PHOSPHATE, MONOBASIC 7; 19 G/133ML; G/133ML
1 ENEMA RECTAL ONCE AS NEEDED
Status: DISCONTINUED | OUTPATIENT
Start: 2022-07-25 | End: 2022-08-03

## 2022-07-25 RX ORDER — SPIRONOLACTONE 100 MG/1
100 TABLET, FILM COATED ORAL DAILY
Status: DISCONTINUED | OUTPATIENT
Start: 2022-07-25 | End: 2022-07-25

## 2022-07-25 RX ORDER — ONDANSETRON 2 MG/ML
INJECTION INTRAMUSCULAR; INTRAVENOUS
Status: COMPLETED
Start: 2022-07-25 | End: 2022-07-25

## 2022-07-25 RX ORDER — HYDROMORPHONE HYDROCHLORIDE 1 MG/ML
0.8 INJECTION, SOLUTION INTRAMUSCULAR; INTRAVENOUS; SUBCUTANEOUS EVERY 2 HOUR PRN
Status: DISCONTINUED | OUTPATIENT
Start: 2022-07-25 | End: 2022-07-25

## 2022-07-25 RX ORDER — BISACODYL 10 MG
10 SUPPOSITORY, RECTAL RECTAL
Status: DISCONTINUED | OUTPATIENT
Start: 2022-07-25 | End: 2022-08-03

## 2022-07-25 RX ORDER — SENNOSIDES 8.6 MG
17.2 TABLET ORAL NIGHTLY PRN
Status: DISCONTINUED | OUTPATIENT
Start: 2022-07-25 | End: 2022-08-03

## 2022-07-25 NOTE — ED INITIAL ASSESSMENT (HPI)
Pt presents to ED for vomiting and weakness. Per  pt discharged from here Friday, had 3.7 liters removed from abdomen, hx of colon cancer.

## 2022-07-25 NOTE — PROGRESS NOTES
NURSING ADMISSION NOTE      Patient admitted via Cart from ER accompanied  By her  due to nausea and abd pain, pt  with hx of colon cancer, abdomen is distended but soft, denies Chest pain, had paracentesis from previous admission. Oriented to room. Safety precautions initiated. Bed in low position. Call light in reach. Patient felt she needed paracentesis again this time,   Had not taken her eliquis for 2 days now. Clear liquid, pain med.  Fall prec in place,  Will monitor,

## 2022-07-25 NOTE — PLAN OF CARE
Pt received alert and oriented x4, c/o abdominal pain 7-8/10. Prn dilaudid given with good relief. Paracentesis completed today with 4L off. Ambulating to bathroom with assist. Clear liquid diet, will advance as tolerated. VSS, afebrile. PAC with good blood return. Call light within reach.

## 2022-07-26 ENCOUNTER — APPOINTMENT (OUTPATIENT)
Dept: MRI IMAGING | Facility: HOSPITAL | Age: 57
End: 2022-07-26
Attending: INTERNAL MEDICINE
Payer: COMMERCIAL

## 2022-07-26 LAB
ANION GAP SERPL CALC-SCNC: 3 MMOL/L (ref 0–18)
BUN BLD-MCNC: 7 MG/DL (ref 7–18)
CALCIUM BLD-MCNC: 8.4 MG/DL (ref 8.5–10.1)
CHLORIDE SERPL-SCNC: 100 MMOL/L (ref 98–112)
CO2 SERPL-SCNC: 28 MMOL/L (ref 21–32)
CREAT BLD-MCNC: 0.76 MG/DL
ERYTHROCYTE [DISTWIDTH] IN BLOOD BY AUTOMATED COUNT: 16 %
GLUCOSE BLD-MCNC: 83 MG/DL (ref 70–99)
HCT VFR BLD AUTO: 30 %
HGB BLD-MCNC: 9.7 G/DL
MCH RBC QN AUTO: 27.2 PG (ref 26–34)
MCHC RBC AUTO-ENTMCNC: 32.3 G/DL (ref 31–37)
MCV RBC AUTO: 84 FL
OSMOLALITY SERPL CALC.SUM OF ELEC: 269 MOSM/KG (ref 275–295)
PLATELET # BLD AUTO: 252 10(3)UL (ref 150–450)
POTASSIUM SERPL-SCNC: 4.1 MMOL/L (ref 3.5–5.1)
RBC # BLD AUTO: 3.57 X10(6)UL
SODIUM SERPL-SCNC: 131 MMOL/L (ref 136–145)
WBC # BLD AUTO: 6.1 X10(3) UL (ref 4–11)

## 2022-07-26 PROCEDURE — A9581 GADOXETATE DISODIUM INJ: HCPCS | Performed by: INTERNAL MEDICINE

## 2022-07-26 PROCEDURE — 85027 COMPLETE CBC AUTOMATED: CPT | Performed by: STUDENT IN AN ORGANIZED HEALTH CARE EDUCATION/TRAINING PROGRAM

## 2022-07-26 PROCEDURE — 80048 BASIC METABOLIC PNL TOTAL CA: CPT | Performed by: STUDENT IN AN ORGANIZED HEALTH CARE EDUCATION/TRAINING PROGRAM

## 2022-07-26 PROCEDURE — 74183 MRI ABD W/O CNTR FLWD CNTR: CPT | Performed by: INTERNAL MEDICINE

## 2022-07-26 RX ORDER — MULTIPLE VITAMINS W/ MINERALS TAB 9MG-400MCG
1 TAB ORAL DAILY
Status: DISCONTINUED | OUTPATIENT
Start: 2022-07-26 | End: 2022-08-03

## 2022-07-26 RX ORDER — HYDROMORPHONE HYDROCHLORIDE 1 MG/ML
1 INJECTION, SOLUTION INTRAMUSCULAR; INTRAVENOUS; SUBCUTANEOUS
Status: DISCONTINUED | OUTPATIENT
Start: 2022-07-26 | End: 2022-08-03

## 2022-07-26 NOTE — PLAN OF CARE
Received pt alert and oriented x4. VSS. Afebrile. Pt c/o abdominal pain 8-9/10. Pt given PRN Dilaudid 0.8mg and PRN oxy with no relief. MD paged and ordered PRN Dilaudid 1mg Q3H - given to pt with relief. IVF infusing at 83ml/hr. Safety precautions in place and call light within reach.

## 2022-07-26 NOTE — PROGRESS NOTES
Patient is alert and oriented x4. Patient is receiving prn pain meds with good relief. Medications given per MAR. IVF discontinued. Patient scheduled for MRI of the abdomen with liver. Fall precautions in place.

## 2022-07-27 RX ORDER — SPIRONOLACTONE 25 MG/1
50 TABLET ORAL DAILY
Status: DISCONTINUED | OUTPATIENT
Start: 2022-07-27 | End: 2022-08-03

## 2022-07-27 RX ORDER — FUROSEMIDE 20 MG/1
20 TABLET ORAL DAILY
Status: DISCONTINUED | OUTPATIENT
Start: 2022-07-27 | End: 2022-08-03

## 2022-07-27 NOTE — PLAN OF CARE
Received pt alert and oriented x4. VSS. Afebrile. Pt c/o abdominal pain 5/10 and given PRN oxycodone and PRN Dilaudid. Port flushed with good blood return. Meds given per MAR orders. Safety precautions in place and call light within reach.

## 2022-07-27 NOTE — PLAN OF CARE
Pt received alert and oriented x4, c/o abdominal pain 5/10. Prn dilaudid per [facility] with good relief. Tolerating soft diet. BM x1. Eliquis held per Dr. Yates Case for possible paracentesis or pleurex placement. Abdomen with mild distention. GI consulted per orders. Ambulating to bathroom. Call light within reach.

## 2022-07-28 LAB
ANION GAP SERPL CALC-SCNC: 3 MMOL/L (ref 0–18)
BASOPHILS # BLD AUTO: 0.04 X10(3) UL (ref 0–0.2)
BASOPHILS NFR BLD AUTO: 0.6 %
BUN BLD-MCNC: 8 MG/DL (ref 7–18)
CALCIUM BLD-MCNC: 8.9 MG/DL (ref 8.5–10.1)
CHLORIDE SERPL-SCNC: 99 MMOL/L (ref 98–112)
CO2 SERPL-SCNC: 30 MMOL/L (ref 21–32)
CREAT BLD-MCNC: 0.85 MG/DL
EOSINOPHIL # BLD AUTO: 0.5 X10(3) UL (ref 0–0.7)
EOSINOPHIL NFR BLD AUTO: 7.2 %
ERYTHROCYTE [DISTWIDTH] IN BLOOD BY AUTOMATED COUNT: 16.1 %
GLUCOSE BLD-MCNC: 103 MG/DL (ref 70–99)
HCT VFR BLD AUTO: 30.6 %
HGB BLD-MCNC: 10 G/DL
IMM GRANULOCYTES # BLD AUTO: 0.02 X10(3) UL (ref 0–1)
IMM GRANULOCYTES NFR BLD: 0.3 %
LYMPHOCYTES # BLD AUTO: 0.89 X10(3) UL (ref 1–4)
LYMPHOCYTES NFR BLD AUTO: 12.8 %
MCH RBC QN AUTO: 27.1 PG (ref 26–34)
MCHC RBC AUTO-ENTMCNC: 32.7 G/DL (ref 31–37)
MCV RBC AUTO: 82.9 FL
MONOCYTES # BLD AUTO: 0.67 X10(3) UL (ref 0.1–1)
MONOCYTES NFR BLD AUTO: 9.6 %
NEUTROPHILS # BLD AUTO: 4.86 X10 (3) UL (ref 1.5–7.7)
NEUTROPHILS # BLD AUTO: 4.86 X10(3) UL (ref 1.5–7.7)
NEUTROPHILS NFR BLD AUTO: 69.5 %
OSMOLALITY SERPL CALC.SUM OF ELEC: 273 MOSM/KG (ref 275–295)
PLATELET # BLD AUTO: 292 10(3)UL (ref 150–450)
POTASSIUM SERPL-SCNC: 4.1 MMOL/L (ref 3.5–5.1)
RBC # BLD AUTO: 3.69 X10(6)UL
SODIUM SERPL-SCNC: 132 MMOL/L (ref 136–145)
WBC # BLD AUTO: 7 X10(3) UL (ref 4–11)

## 2022-07-28 PROCEDURE — 85025 COMPLETE CBC W/AUTO DIFF WBC: CPT | Performed by: INTERNAL MEDICINE

## 2022-07-28 PROCEDURE — 80048 BASIC METABOLIC PNL TOTAL CA: CPT | Performed by: INTERNAL MEDICINE

## 2022-07-28 NOTE — PLAN OF CARE
Problem: PAIN - ADULT  Goal: Verbalizes/displays adequate comfort level or patient's stated pain goal  Description: INTERVENTIONS:  - Encourage pt to monitor pain and request assistance  - Assess pain using appropriate pain scale  - Administer analgesics based on type and severity of pain and evaluate response  - Implement non-pharmacological measures as appropriate and evaluate response  - Consider cultural and social influences on pain and pain management  - Manage/alleviate anxiety  - Utilize distraction and/or relaxation techniques  - Monitor for opioid side effects  - Notify MD/LIP if interventions unsuccessful or patient reports new pain  - Anticipate increased pain with activity and pre-medicate as appropriate  Pt uses pain scale for PRN medications   Outcome: Progressing   Plan of care discussed with patient for evening. Pain medication discussed. Pt continues to have abd pain, possible paracentesis tomorrow. To remain NPO awaiting decision for tomorrow.

## 2022-07-28 NOTE — PROGRESS NOTES
Patient is alert and oriented x4. Patient receiving prn pain medications with some relief. Medications given per MAR. Patient scheduled for pleurx placement tomorrow. Was scheduled for today but moved to tomorrow due to patient receiving eliquis on the 26th. Eliquis on hold. Patient NPO after midnight. Call light within reach.

## 2022-07-29 ENCOUNTER — APPOINTMENT (OUTPATIENT)
Dept: INTERVENTIONAL RADIOLOGY/VASCULAR | Facility: HOSPITAL | Age: 57
End: 2022-07-29
Attending: INTERNAL MEDICINE
Payer: COMMERCIAL

## 2022-07-29 LAB
ANION GAP SERPL CALC-SCNC: 3 MMOL/L (ref 0–18)
BUN BLD-MCNC: 10 MG/DL (ref 7–18)
CALCIUM BLD-MCNC: 8.7 MG/DL (ref 8.5–10.1)
CHLORIDE SERPL-SCNC: 97 MMOL/L (ref 98–112)
CO2 SERPL-SCNC: 32 MMOL/L (ref 21–32)
CREAT BLD-MCNC: 0.92 MG/DL
GLUCOSE BLD-MCNC: 100 MG/DL (ref 70–99)
INR BLD: 1 (ref 0.8–1.2)
OSMOLALITY SERPL CALC.SUM OF ELEC: 273 MOSM/KG (ref 275–295)
POTASSIUM SERPL-SCNC: 4.2 MMOL/L (ref 3.5–5.1)
PROTHROMBIN TIME: 13.2 SECONDS (ref 11.6–14.8)
SODIUM SERPL-SCNC: 132 MMOL/L (ref 136–145)

## 2022-07-29 PROCEDURE — 0W9G30Z DRAINAGE OF PERITONEAL CAVITY WITH DRAINAGE DEVICE, PERCUTANEOUS APPROACH: ICD-10-PCS | Performed by: RADIOLOGY

## 2022-07-29 PROCEDURE — 80048 BASIC METABOLIC PNL TOTAL CA: CPT | Performed by: INTERNAL MEDICINE

## 2022-07-29 PROCEDURE — 85610 PROTHROMBIN TIME: CPT | Performed by: HOSPITALIST

## 2022-07-29 RX ORDER — LIDOCAINE HYDROCHLORIDE AND EPINEPHRINE 10; 10 MG/ML; UG/ML
INJECTION, SOLUTION INFILTRATION; PERINEURAL
Status: COMPLETED
Start: 2022-07-29 | End: 2022-07-29

## 2022-07-29 RX ORDER — CEFAZOLIN SODIUM/WATER 2 G/20 ML
SYRINGE (ML) INTRAVENOUS
Status: COMPLETED
Start: 2022-07-29 | End: 2022-07-29

## 2022-07-29 RX ORDER — LIDOCAINE HYDROCHLORIDE 10 MG/ML
INJECTION, SOLUTION INFILTRATION; PERINEURAL
Status: COMPLETED
Start: 2022-07-29 | End: 2022-07-29

## 2022-07-29 RX ORDER — MIDAZOLAM HYDROCHLORIDE 1 MG/ML
INJECTION INTRAMUSCULAR; INTRAVENOUS
Status: COMPLETED
Start: 2022-07-29 | End: 2022-07-29

## 2022-07-29 NOTE — CM/SW NOTE
07/29/22 1338   / Referral Data   Referral Source    Reason for Referral Discharge planning   Informant Patient;Spouse/Significant Other   Readmission Assessment   Factors that patient feels contributed to this readmission Acute/Chronic Clinical Presentation   Pertinent Medical Hx   Does patient have an established PCP? 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 Spoke w/patient and spouse re: need for home health setup and drain equipment. Supply of drains at bedside for patient to take home at time of dc. CM requested department  [facility]) to initiate [address] referral for [facility] [address] [facility]. Page to Dr Talia Valentin and notified to sign order form for pleurx drains on paper chart. 1610 signed pleurx form uploaded to aidin referral. Per Dr Talia Valentin, patient to drain prn.      Gilma Chacon RN,   [id number]

## 2022-07-29 NOTE — PLAN OF CARE
Had abdominal pleurx catheter insertion this morning, abdomen was drained after procedure down in IR, returned to room, slightly groggy but experiencing pain to right lower quadrant, requesting IV Dilaudid for pain of 8/10 to insertion site. Diet was resumed, abdomen is softer, denies nausea or vomiting.    Problem: PAIN - ADULT  Goal: Verbalizes/displays adequate comfort level or patient's stated pain goal  Description: INTERVENTIONS:  - Encourage pt to monitor pain and request assistance  - Assess pain using appropriate pain scale  - Administer analgesics based on type and severity of pain and evaluate response  - Implement non-pharmacological measures as appropriate and evaluate response  - Consider cultural and social influences on pain and pain management  - Manage/alleviate anxiety  - Utilize distraction and/or relaxation techniques  - Monitor for opioid side effects  - Notify MD/LIP if interventions unsuccessful or patient reports new pain  - Anticipate increased pain with activity and pre-medicate as appropriate  Pt uses pain scale for PRN medications   Outcome: Not Progressing [facility] Hospitalist Progress Note    Date of Service: 2017    Chief Complaint  75 y.o. male admitted 2017 with alcohol abuse admitted after fall and intubated in ED.    Interval Problem Update  Admitted this am by Dr Panchal.      Consultants/Specialty  Pulmonary    Disposition  Watch for withdrawals        Review of Systems   Unable to perform ROS: intubated      Physical Exam  Laboratory/Imaging   Hemodynamics  Temp (24hrs), Av.7 °C (98 °F), Min:36.2 °C (97.2 °F), Max:36.9 °C (98.4 °F)   Temperature: 36.9 °C (98.4 °F), Monitored Temp: 37.6 °C (99.7 °F)  Pulse  Av.8  Min: 72  Max: 97 Heart Rate (Monitored): 74  Blood Pressure : 130/62 mmHg, NIBP: 115/58 mmHg      Respiratory  [place] Vent Mode: APVCMV, Rate (breaths/min): 20, PEEP/CPAP: 8, PEEP/CPAP: 8, FiO2: 40, P Peak (PIP): 22, P MEAN: 11   Respiration: 20, Pulse Oximetry: 99 %     Work Of Breathing / Effort: Vented  RUL Breath Sounds: Clear, RML Breath Sounds: Clear;Diminished, RLL Breath Sounds: Diminished, LION Breath Sounds: Clear, LLL Breath Sounds: Diminished    Fluids    Intake/Output Summary (Last 24 hours) at 17  Last data filed at 17   Gross per 24 hour   Intake 6815.62 ml   Output   1015 ml   Net 5800.62 ml       Nutrition  Orders Placed This Encounter   Procedures   • Diet NPO     Standing Status: Standing      Number of Occurrences: 1      Standing Expiration Date:      Order Specific Question:  Type:     Answer:  Now [1]     Order Specific Question:  Restrict to:     Answer:  Strict [1]     Physical Exam    Recent Labs      17   WBC  20.8*   RBC  3.32*   HEMOGLOBIN  11.1*   HEMATOCRIT  33.2*   MCV  100.0*   MCH  33.4*   MCHC  33.4*   RDW  48.7   PLATELETCT  162*   MPV  10.1     Recent Labs      17   SODIUM  133*   POTASSIUM  3.8   CHLORIDE  104   CO2  16*   GLUCOSE  134*   BUN  13   CREATININE  0.58   CALCIUM  8.1*     Recent Labs      17   APTT  26.3   INR  1.08                   Assessment/Plan     Aspiration pneumonitis (CMS-HCC)  Assessment & Plan  Intubated.  abx  Monitor vitals, I/Os    Respiratory failure (CMS-HCC)  Assessment & Plan  Pulmonary consulting.  Monitor ABG, CXR, vitals, I/O's  Wean once not sign of EtOH w/d.    Alcohol abuse (present on admission)  Assessment & Plan  Watch for w/d.      Core Measures

## 2022-07-29 NOTE — CM/SW NOTE
Department  notified of request for [facility], [facility] referrals started. Assigned CM/SW to follow up with pt/family on further discharge planning.      Mervat [facility]

## 2022-07-29 NOTE — PLAN OF CARE
Patient A&O x4. VSS. Abdomen distended and firm, causing pain. Pain relieved by IV dilaudid. NPO since midnight in preparation for 2 PM procedure. Bed alarm on. Fall precautions in place. Will continue to monitor.      Problem: PAIN - ADULT  Goal: Verbalizes/displays adequate comfort level or patient's stated pain goal  Description: INTERVENTIONS:  - Encourage pt to monitor pain and request assistance  - Assess pain using appropriate pain scale  - Administer analgesics based on type and severity of pain and evaluate response  - Implement non-pharmacological measures as appropriate and evaluate response  - Consider cultural and social influences on pain and pain management  - Manage/alleviate anxiety  - Utilize distraction and/or relaxation techniques  - Monitor for opioid side effects  - Notify MD/LIP if interventions unsuccessful or patient reports new pain  - Anticipate increased pain with activity and pre-medicate as appropriate  Pt uses pain scale for PRN medications   Outcome: Progressing

## 2022-07-30 LAB
ANION GAP SERPL CALC-SCNC: 5 MMOL/L (ref 0–18)
BUN BLD-MCNC: 10 MG/DL (ref 7–18)
CALCIUM BLD-MCNC: 8.4 MG/DL (ref 8.5–10.1)
CHLORIDE SERPL-SCNC: 97 MMOL/L (ref 98–112)
CO2 SERPL-SCNC: 30 MMOL/L (ref 21–32)
CREAT BLD-MCNC: 0.79 MG/DL
GLUCOSE BLD-MCNC: 89 MG/DL (ref 70–99)
OSMOLALITY SERPL CALC.SUM OF ELEC: 273 MOSM/KG (ref 275–295)
POTASSIUM SERPL-SCNC: 4.3 MMOL/L (ref 3.5–5.1)
SODIUM SERPL-SCNC: 132 MMOL/L (ref 136–145)

## 2022-07-30 PROCEDURE — 80048 BASIC METABOLIC PNL TOTAL CA: CPT | Performed by: INTERNAL MEDICINE

## 2022-07-30 RX ORDER — KETOROLAC TROMETHAMINE 30 MG/ML
15 INJECTION, SOLUTION INTRAMUSCULAR; INTRAVENOUS EVERY 6 HOURS PRN
Status: DISPENSED | OUTPATIENT
Start: 2022-07-30 | End: 2022-08-01

## 2022-07-30 RX ORDER — OXYCODONE HCL 10 MG/1
10 TABLET, FILM COATED, EXTENDED RELEASE ORAL EVERY 12 HOURS
Status: DISCONTINUED | OUTPATIENT
Start: 2022-07-30 | End: 2022-08-01

## 2022-07-30 RX ORDER — KETOROLAC TROMETHAMINE 30 MG/ML
30 INJECTION, SOLUTION INTRAMUSCULAR; INTRAVENOUS EVERY 6 HOURS PRN
Status: DISPENSED | OUTPATIENT
Start: 2022-07-30 | End: 2022-08-01

## 2022-07-30 NOTE — PLAN OF CARE
Problem: PAIN - ADULT  Goal: Verbalizes/displays adequate comfort level or patient's stated pain goal  Description: INTERVENTIONS:  - Encourage pt to monitor pain and request assistance  - Assess pain using appropriate pain scale  - Administer analgesics based on type and severity of pain and evaluate response  - Implement non-pharmacological measures as appropriate and evaluate response  - Consider cultural and social influences on pain and pain management  - Manage/alleviate anxiety  - Utilize distraction and/or relaxation techniques  - Monitor for opioid side effects  - Notify MD/LIP if interventions unsuccessful or patient reports new pain  - Anticipate increased pain with activity and pre-medicate as appropriate  Pt uses pain scale for PRN medications   Outcome: Not Progressing     Problem: HEMATOLOGIC - ADULT  Goal: Maintains hematologic stability  Description: INTERVENTIONS  - Assess for signs and symptoms of bleeding or hemorrhage  - Monitor labs and vital signs for trends  - Administer supportive blood products/factors, fluids and medications as ordered and appropriate  - Administer supportive blood products/factors as ordered and appropriate  Outcome: Progressing    Still c/o moderate  abd pain, rating 7/10 , stated that IV pain med works better,  noted pt fall asleep easily and needed prn oxygen after IV pain medicine. Fall precaution in place. Stated had a bowel movement today. Abd pleurx catheter dressing dry and intact. Will monitor.

## 2022-07-31 LAB
ANION GAP SERPL CALC-SCNC: 6 MMOL/L (ref 0–18)
BUN BLD-MCNC: 14 MG/DL (ref 7–18)
CALCIUM BLD-MCNC: 8.4 MG/DL (ref 8.5–10.1)
CHLORIDE SERPL-SCNC: 97 MMOL/L (ref 98–112)
CO2 SERPL-SCNC: 29 MMOL/L (ref 21–32)
CREAT BLD-MCNC: 1.07 MG/DL
GLUCOSE BLD-MCNC: 121 MG/DL (ref 70–99)
OSMOLALITY SERPL CALC.SUM OF ELEC: 276 MOSM/KG (ref 275–295)
POTASSIUM SERPL-SCNC: 4.2 MMOL/L (ref 3.5–5.1)
SODIUM SERPL-SCNC: 132 MMOL/L (ref 136–145)

## 2022-07-31 PROCEDURE — 80048 BASIC METABOLIC PNL TOTAL CA: CPT | Performed by: INTERNAL MEDICINE

## 2022-07-31 NOTE — PLAN OF CARE
Patient alert and oriented x4. Afebrile. Room air. No SOB. Complained of abdominal pain, PRN IV pain medication given with good relief. Pain seems to be under control at this time. No nausea and vomiting. Ambulatory. Fall precautions in place. Call light in reach. Will continue to monitor.        Problem: PAIN - ADULT  Goal: Verbalizes/displays adequate comfort level or patient's stated pain goal  Description: INTERVENTIONS:  - Encourage pt to monitor pain and request assistance  - Assess pain using appropriate pain scale  - Administer analgesics based on type and severity of pain and evaluate response  - Implement non-pharmacological measures as appropriate and evaluate response  - Consider cultural and social influences on pain and pain management  - Manage/alleviate anxiety  - Utilize distraction and/or relaxation techniques  - Monitor for opioid side effects  - Notify MD/LIP if interventions unsuccessful or patient reports new pain  - Anticipate increased pain with activity and pre-medicate as appropriate  Pt uses pain scale for PRN medications   Outcome: Progressing

## 2022-08-01 LAB — NON GYNE INTERPRETATION: NEGATIVE

## 2022-08-01 PROCEDURE — 99152 MOD SED SAME PHYS/QHP 5/>YRS: CPT | Performed by: RADIOLOGY

## 2022-08-01 PROCEDURE — 49418 INSERT TUN IP CATH PERC: CPT | Performed by: RADIOLOGY

## 2022-08-01 RX ORDER — OXYCODONE HYDROCHLORIDE 15 MG/1
15 TABLET, FILM COATED, EXTENDED RELEASE ORAL EVERY 12 HOURS
Status: DISCONTINUED | OUTPATIENT
Start: 2022-08-02 | End: 2022-08-03

## 2022-08-01 RX ORDER — HYDROMORPHONE HYDROCHLORIDE 2 MG/1
2 TABLET ORAL
Status: DISCONTINUED | OUTPATIENT
Start: 2022-08-01 | End: 2022-08-03

## 2022-08-01 NOTE — CM/SW NOTE
SW met with patient and  to provide them with a list of accepting [facility] agencies for [facility] at home. Patient selected [facility]. SW reserved [facility] [place] and added their information to patient DC instructions. SW will continue to follow for plan of care changes and remain available for any additional DC needs or concerns.      Jeffery Cervantes MSW, LSW  Discharge Planner   [phone]

## 2022-08-01 NOTE — PLAN OF CARE
Patient A&O x4. VSS. Afebrile. Patient complained of severe abdominal pain, controlled with IV pain medication administered per orders. Patient does not complain of nausea. Bed alarm on, fall precautions in place. Will continue to monitor.        Problem: PAIN - ADULT  Goal: Verbalizes/displays adequate comfort level or patient's stated pain goal  Description: INTERVENTIONS:  - Encourage pt to monitor pain and request assistance  - Assess pain using appropriate pain scale  - Administer analgesics based on type and severity of pain and evaluate response  - Implement non-pharmacological measures as appropriate and evaluate response  - Consider cultural and social influences on pain and pain management  - Manage/alleviate anxiety  - Utilize distraction and/or relaxation techniques  - Monitor for opioid side effects  - Notify MD/LIP if interventions unsuccessful or patient reports new pain  - Anticipate increased pain with activity and pre-medicate as appropriate  Pt uses pain scale for PRN medications   Outcome: Progressing     Problem: HEMATOLOGIC - ADULT  Goal: Maintains hematologic stability  Description: INTERVENTIONS  - Assess for signs and symptoms of bleeding or hemorrhage  - Monitor labs and vital signs for trends  - Administer supportive blood products/factors, fluids and medications as ordered and appropriate  - Administer supportive blood products/factors as ordered and appropriate  Outcome: Progressing

## 2022-08-02 LAB
ANION GAP SERPL CALC-SCNC: 6 MMOL/L (ref 0–18)
BUN BLD-MCNC: 19 MG/DL (ref 7–18)
CALCIUM BLD-MCNC: 8.7 MG/DL (ref 8.5–10.1)
CHLORIDE SERPL-SCNC: 95 MMOL/L (ref 98–112)
CO2 SERPL-SCNC: 27 MMOL/L (ref 21–32)
CREAT BLD-MCNC: 0.87 MG/DL
ERYTHROCYTE [DISTWIDTH] IN BLOOD BY AUTOMATED COUNT: 15.9 %
GLUCOSE BLD-MCNC: 97 MG/DL (ref 70–99)
HCT VFR BLD AUTO: 27.6 %
HGB BLD-MCNC: 9 G/DL
MAGNESIUM SERPL-MCNC: 1.9 MG/DL (ref 1.6–2.6)
MCH RBC QN AUTO: 27.2 PG (ref 26–34)
MCHC RBC AUTO-ENTMCNC: 32.6 G/DL (ref 31–37)
MCV RBC AUTO: 83.4 FL
OSMOLALITY SERPL CALC.SUM OF ELEC: 268 MOSM/KG (ref 275–295)
PLATELET # BLD AUTO: 244 10(3)UL (ref 150–450)
POTASSIUM SERPL-SCNC: 4.7 MMOL/L (ref 3.5–5.1)
RBC # BLD AUTO: 3.31 X10(6)UL
SODIUM SERPL-SCNC: 128 MMOL/L (ref 136–145)
WBC # BLD AUTO: 13 X10(3) UL (ref 4–11)

## 2022-08-02 PROCEDURE — 85027 COMPLETE CBC AUTOMATED: CPT | Performed by: INTERNAL MEDICINE

## 2022-08-02 PROCEDURE — 80048 BASIC METABOLIC PNL TOTAL CA: CPT | Performed by: INTERNAL MEDICINE

## 2022-08-02 PROCEDURE — 83735 ASSAY OF MAGNESIUM: CPT | Performed by: INTERNAL MEDICINE

## 2022-08-02 NOTE — PLAN OF CARE
Patient is A/O times 4, breathing on room air evenly and unlabored, vitals are stable. Patient received 1 bolus of 250 through out shift. Patient ambulated from bed to chair with ease. Reported abdominal throughout shift. Medication provided as ordered.    Problem: PAIN - ADULT  Goal: Verbalizes/displays adequate comfort level or patient's stated pain goal  Description: INTERVENTIONS:  - Encourage pt to monitor pain and request assistance  - Assess pain using appropriate pain scale  - Administer analgesics based on type and severity of pain and evaluate response  - Implement non-pharmacological measures as appropriate and evaluate response  - Consider cultural and social influences on pain and pain management  - Manage/alleviate anxiety  - Utilize distraction and/or relaxation techniques  - Monitor for opioid side effects  - Notify MD/LIP if interventions unsuccessful or patient reports new pain  - Anticipate increased pain with activity and pre-medicate as appropriate  Pt uses pain scale for PRN medications   Outcome: Progressing     Problem: HEMATOLOGIC - ADULT  Goal: Maintains hematologic stability  Description: INTERVENTIONS  - Assess for signs and symptoms of bleeding or hemorrhage  - Monitor labs and vital signs for trends  - Administer supportive blood products/factors, fluids and medications as ordered and appropriate  - Administer supportive blood products/factors as ordered and appropriate  Outcome: Progressing

## 2022-08-02 NOTE — PLAN OF CARE
Patient A&O x4. VSS. Afebrile. Patient complains of worsening abdominal pain throughout shift. Pain treated with PO opioids per orders. Oxycodone immediate release provided relief. Bed alarm on. Fall precautions in place. Will continue to monitor.        Problem: PAIN - ADULT  Goal: Verbalizes/displays adequate comfort level or patient's stated pain goal  Description: INTERVENTIONS:  - Encourage pt to monitor pain and request assistance  - Assess pain using appropriate pain scale  - Administer analgesics based on type and severity of pain and evaluate response  - Implement non-pharmacological measures as appropriate and evaluate response  - Consider cultural and social influences on pain and pain management  - Manage/alleviate anxiety  - Utilize distraction and/or relaxation techniques  - Monitor for opioid side effects  - Notify MD/LIP if interventions unsuccessful or patient reports new pain  - Anticipate increased pain with activity and pre-medicate as appropriate  Pt uses pain scale for PRN medications   Outcome: Progressing     Problem: HEMATOLOGIC - ADULT  Goal: Maintains hematologic stability  Description: INTERVENTIONS  - Assess for signs and symptoms of bleeding or hemorrhage  - Monitor labs and vital signs for trends  - Administer supportive blood products/factors, fluids and medications as ordered and appropriate  - Administer supportive blood products/factors as ordered and appropriate  Outcome: Progressing

## 2022-08-02 NOTE — PLAN OF CARE
Patient A&Ox4, reported abdominal pain, PO medications given per MAR. Educated patient on the use of PO pain medications. Pleurex drain on Right abdomen, clean& intact.        Problem: PAIN - ADULT  Goal: Verbalizes/displays adequate comfort level or patient's stated pain goal  Description: INTERVENTIONS:  - Encourage pt to monitor pain and request assistance  - Assess pain using appropriate pain scale  - Administer analgesics based on type and severity of pain and evaluate response  - Implement non-pharmacological measures as appropriate and evaluate response  - Consider cultural and social influences on pain and pain management  - Manage/alleviate anxiety  - Utilize distraction and/or relaxation techniques  - Monitor for opioid side effects  - Notify MD/LIP if interventions unsuccessful or patient reports new pain  - Anticipate increased pain with activity and pre-medicate as appropriate  Pt uses pain scale for PRN medications   Outcome: Progressing     Problem: HEMATOLOGIC - ADULT  Goal: Maintains hematologic stability  Description: INTERVENTIONS  - Assess for signs and symptoms of bleeding or hemorrhage  - Monitor labs and vital signs for trends  - Administer supportive blood products/factors, fluids and medications as ordered and appropriate  - Administer supportive blood products/factors as ordered and appropriate  Outcome: Progressing

## 2022-08-03 ENCOUNTER — TELEPHONE (OUTPATIENT)
Dept: INTERNAL MEDICINE UNIT | Facility: HOSPITAL | Age: 57
End: 2022-08-03

## 2022-08-03 VITALS
SYSTOLIC BLOOD PRESSURE: 95 MMHG | BODY MASS INDEX: 17.42 KG/M2 | WEIGHT: 111 LBS | TEMPERATURE: 97 F | DIASTOLIC BLOOD PRESSURE: 60 MMHG | HEIGHT: 67 IN | OXYGEN SATURATION: 95 % | HEART RATE: 89 BPM | RESPIRATION RATE: 18 BRPM

## 2022-08-03 RX ORDER — MORPHINE SULFATE 15 MG/1
15 TABLET, FILM COATED, EXTENDED RELEASE ORAL EVERY 12 HOURS SCHEDULED
Qty: 60 TABLET | Refills: 0 | Status: SHIPPED | OUTPATIENT
Start: 2022-08-03 | End: 2022-09-02

## 2022-08-03 RX ORDER — HYDROMORPHONE HYDROCHLORIDE 2 MG/1
2 TABLET ORAL
Qty: 30 TABLET | Refills: 0 | Status: SHIPPED | OUTPATIENT
Start: 2022-08-03

## 2022-08-03 RX ORDER — OXYCODONE HYDROCHLORIDE 10 MG/1
10 TABLET ORAL EVERY 4 HOURS PRN
Qty: 60 TABLET | Refills: 0 | Status: SHIPPED | OUTPATIENT
Start: 2022-08-03

## 2022-08-03 RX ORDER — FUROSEMIDE 20 MG/1
20 TABLET ORAL DAILY
Refills: 0 | Status: SHIPPED | COMMUNITY
Start: 2022-08-04

## 2022-08-03 RX ORDER — OXYCODONE HYDROCHLORIDE 15 MG/1
15 TABLET, FILM COATED, EXTENDED RELEASE ORAL EVERY 12 HOURS
Qty: 14 TABLET | Refills: 0 | Status: SHIPPED | OUTPATIENT
Start: 2022-08-03

## 2022-08-03 RX ORDER — SPIRONOLACTONE 50 MG/1
50 TABLET, FILM COATED ORAL DAILY
Refills: 0 | Status: SHIPPED | COMMUNITY
Start: 2022-08-04

## 2022-08-03 NOTE — PROGRESS NOTES
NURSING DISCHARGE NOTE    Discharged Home via Wheelchair. Accompanied by Family member  Belongings Taken by patient/family. Patient A&Ox4, reported abdominal pain, pain meds given per MAR. Pleurx in abdomen drained today, output= 3L. Educated and had  demonstrate draining of the pleurx. Port de-assessed. AVS, home medications, and follow up appts gone over w/patient & patient's , all questions answered.

## 2022-08-03 NOTE — PLAN OF CARE
Patient is alert and oriented x4, with moderate abdominal pain relieved by Oxycodone IR. VSS, soft mildly distended abdomen, active bowel sounds and mild tenderness. Pleurex drain on RLQ in abdomen, dressing is clean, dry and intact. Sutures intact and no noted s/s of infection to site. Able to ambulate with supervision only. Fall prec. maintained.     Problem: HEMATOLOGIC - ADULT  Goal: Maintains hematologic stability  Description: INTERVENTIONS  - Assess for signs and symptoms of bleeding or hemorrhage  - Monitor labs and vital signs for trends  - Administer supportive blood products/factors, fluids and medications as ordered and appropriate  - Administer supportive blood products/factors as ordered and appropriate  Outcome: Progressing

## 2022-08-04 ENCOUNTER — PATIENT OUTREACH (OUTPATIENT)
Dept: CASE MANAGEMENT | Age: 57
End: 2022-08-04

## 2022-08-04 NOTE — TELEPHONE ENCOUNTER
Notified by RN on 4 medical oncology floor at [facility] that patient's  calling. Patient was unable to  the hydromorphone prescription written by hospitalist at discharge as script had 'copy' printed on it.  upset, and requesting new prescription be set. Per chart review patient was being discharged on hydromorphine 2 mg q3 hours prn. Patient with history of metastatic colon cancer with peritoneal carcinomatosis. She was previously on oxycontin BID and oxycodone IR prn. Patient was taking all three medications in the hospital and tolerating. New prescription sent to pharmacy.

## 2022-08-04 NOTE — PROGRESS NOTES
1st attempt HFU apt request    Dr. Richie Juarez. [facility]  Gastroenterology  [facility]  [address] [address]  [phone]  Apt for Aug 25th @ 11:20am        [address]  [id number]  [place], [address] [place] [address] Rd  [phone]  Apt for Aug 9 @ 9:20am    Confirmed w/ pt  Faviola Linda encounter

## 2022-08-04 NOTE — PAYOR COMM NOTE
Discharge Notification    Patient Name: Edwin Villegas  Payor: MARI HAGAN  Subscriber #: [id number]  Authorization Number: [id number]  Admit Date/Time: 7/24/2022 7:48 PM  Discharge Date/Time: 8/3/2022 5:42 PM

## 2022-08-13 ENCOUNTER — HOSPITAL ENCOUNTER (EMERGENCY)
Facility: HOSPITAL | Age: 57
Discharge: HOME | End: 2022-08-13
Attending: EMERGENCY MEDICINE
Payer: COMMERCIAL

## 2022-08-13 VITALS
RESPIRATION RATE: 14 BRPM | SYSTOLIC BLOOD PRESSURE: 116 MMHG | OXYGEN SATURATION: 100 % | HEART RATE: 92 BPM | DIASTOLIC BLOOD PRESSURE: 85 MMHG | TEMPERATURE: 97 F

## 2022-08-13 DIAGNOSIS — C18.9 MALIGNANT NEOPLASM OF COLON, UNSPECIFIED PART OF COLON (HCC): ICD-10-CM

## 2022-08-13 DIAGNOSIS — R53.1 WEAKNESS GENERALIZED: Primary | ICD-10-CM

## 2022-08-13 LAB
ALBUMIN SERPL-MCNC: 2.1 G/DL (ref 3.4–5)
ALBUMIN/GLOB SERPL: 0.5 {RATIO} (ref 1–2)
ALP LIVER SERPL-CCNC: 124 U/L
ALT SERPL-CCNC: 13 U/L
ANION GAP SERPL CALC-SCNC: 7 MMOL/L (ref 0–18)
AST SERPL-CCNC: 53 U/L (ref 15–37)
BASOPHILS # BLD AUTO: 0.03 X10(3) UL (ref 0–0.2)
BASOPHILS NFR BLD AUTO: 0.3 %
BILIRUB SERPL-MCNC: 0.4 MG/DL (ref 0.1–2)
BUN BLD-MCNC: 16 MG/DL (ref 7–18)
CALCIUM BLD-MCNC: 9.2 MG/DL (ref 8.5–10.1)
CHLORIDE SERPL-SCNC: 90 MMOL/L (ref 98–112)
CO2 SERPL-SCNC: 30 MMOL/L (ref 21–32)
CREAT BLD-MCNC: 1.15 MG/DL
EOSINOPHIL # BLD AUTO: 0.17 X10(3) UL (ref 0–0.7)
EOSINOPHIL NFR BLD AUTO: 1.4 %
ERYTHROCYTE [DISTWIDTH] IN BLOOD BY AUTOMATED COUNT: 15.7 %
GFR SERPLBLD BASED ON 1.73 SQ M-ARVRAT: 56 ML/MIN/1.73M2 (ref 60–?)
GLOBULIN PLAS-MCNC: 4 G/DL (ref 2.8–4.4)
GLUCOSE BLD-MCNC: 95 MG/DL (ref 70–99)
HCT VFR BLD AUTO: 33.9 %
HGB BLD-MCNC: 11.1 G/DL
IMM GRANULOCYTES # BLD AUTO: 0.09 X10(3) UL (ref 0–1)
IMM GRANULOCYTES NFR BLD: 0.8 %
LYMPHOCYTES # BLD AUTO: 0.91 X10(3) UL (ref 1–4)
LYMPHOCYTES NFR BLD AUTO: 7.6 %
MCH RBC QN AUTO: 26.9 PG (ref 26–34)
MCHC RBC AUTO-ENTMCNC: 32.7 G/DL (ref 31–37)
MCV RBC AUTO: 82.1 FL
MONOCYTES # BLD AUTO: 0.66 X10(3) UL (ref 0.1–1)
MONOCYTES NFR BLD AUTO: 5.5 %
NEUTROPHILS # BLD AUTO: 10.07 X10 (3) UL (ref 1.5–7.7)
NEUTROPHILS # BLD AUTO: 10.07 X10(3) UL (ref 1.5–7.7)
NEUTROPHILS NFR BLD AUTO: 84.4 %
OSMOLALITY SERPL CALC.SUM OF ELEC: 265 MOSM/KG (ref 275–295)
PLATELET # BLD AUTO: 375 10(3)UL (ref 150–450)
POTASSIUM SERPL-SCNC: 4.6 MMOL/L (ref 3.5–5.1)
PROT SERPL-MCNC: 6.1 G/DL (ref 6.4–8.2)
RBC # BLD AUTO: 4.13 X10(6)UL
SODIUM SERPL-SCNC: 127 MMOL/L (ref 136–145)
WBC # BLD AUTO: 11.9 X10(3) UL (ref 4–11)

## 2022-08-13 PROCEDURE — 93005 ELECTROCARDIOGRAM TRACING: CPT

## 2022-08-13 PROCEDURE — 85025 COMPLETE CBC W/AUTO DIFF WBC: CPT | Performed by: EMERGENCY MEDICINE

## 2022-08-13 PROCEDURE — 80053 COMPREHEN METABOLIC PANEL: CPT | Performed by: EMERGENCY MEDICINE

## 2022-08-13 PROCEDURE — 93010 ELECTROCARDIOGRAM REPORT: CPT

## 2022-08-13 PROCEDURE — 99284 EMERGENCY DEPT VISIT MOD MDM: CPT

## 2022-08-13 PROCEDURE — 96360 HYDRATION IV INFUSION INIT: CPT

## 2022-08-13 NOTE — ED INITIAL ASSESSMENT (HPI)
A&Ox3 patient pmh colon CA (due to start chemo on Monday) p/w c/o K+ 6.2    Patient had labs drawn yesterday in preparation for chemo on Monday and called back today w/ abnormal result.     Patient denies any cp/sob/n/v/d, +fatigue    RR even/NL, speaking in full clear sentences, +weakness

## 2022-08-15 LAB
ATRIAL RATE: 87 BPM
P AXIS: 73 DEGREES
P-R INTERVAL: 120 MS
Q-T INTERVAL: 370 MS
QRS DURATION: 80 MS
QTC CALCULATION (BEZET): 445 MS
R AXIS: 90 DEGREES
T AXIS: 72 DEGREES
VENTRICULAR RATE: 87 BPM

## 2022-08-19 ENCOUNTER — HOSPITAL ENCOUNTER (INPATIENT)
Facility: HOSPITAL | Age: 57
LOS: 10 days | Discharge: HOSPICE | End: 2022-08-29
Attending: EMERGENCY MEDICINE | Admitting: INTERNAL MEDICINE
Payer: COMMERCIAL

## 2022-08-19 ENCOUNTER — APPOINTMENT (OUTPATIENT)
Dept: GENERAL RADIOLOGY | Facility: HOSPITAL | Age: 57
End: 2022-08-19
Attending: EMERGENCY MEDICINE
Payer: COMMERCIAL

## 2022-08-19 ENCOUNTER — APPOINTMENT (OUTPATIENT)
Dept: CT IMAGING | Facility: HOSPITAL | Age: 57
End: 2022-08-19
Attending: EMERGENCY MEDICINE
Payer: COMMERCIAL

## 2022-08-19 PROBLEM — R79.89 AZOTEMIA: Status: ACTIVE | Noted: 2022-01-01

## 2022-08-19 PROBLEM — C18.9 METASTATIC COLON CANCER IN FEMALE (HCC): Status: ACTIVE | Noted: 2022-01-01

## 2022-08-19 PROBLEM — R79.89 AZOTEMIA: Status: ACTIVE | Noted: 2022-08-19

## 2022-08-19 PROBLEM — D64.9 ANEMIA: Status: ACTIVE | Noted: 2022-08-19

## 2022-08-19 PROBLEM — C18.9 METASTATIC COLON CANCER IN FEMALE (HCC): Status: ACTIVE | Noted: 2022-08-19

## 2022-08-19 PROBLEM — D64.9 ANEMIA: Status: ACTIVE | Noted: 2022-01-01

## 2022-08-19 NOTE — ED QUICK NOTES
Orders for admission, patient is aware of plan and ready to go upstairs. Any questions, please call ED RN Leilani Nunez at extension [phone].      Patient Covid vaccination status: Fully vaccinated     COVID Test Ordered in ED: Rapid SARS-CoV-2 by PCR    COVID Suspicion at Admission: N/A    Running Infusions:      Mental Status/LOC at time of transport: A&Ox4    Other pertinent information:   CIWA score: N/A   NIH score:  N/A

## 2022-08-19 NOTE — ED INITIAL ASSESSMENT (HPI)
Pt is a 63 y/o female with hx of colon cancer who presents to the ED via EMS for generalized weakness / dizziness / AMS. Pulled off of chemo pump two days ago and has been weak and lethargic since. Oxycodone was given this morning for pain, but pt's mental status has been the same before med. A&Ox3-4 baseline. Respirations even and unlabored. VSS. NAD.  en route.

## 2022-08-20 NOTE — PLAN OF CARE
Problem: PAIN - ADULT  Goal: Verbalizes/displays adequate comfort level or patient's stated pain goal  Description: INTERVENTIONS:  - Encourage pt to monitor pain and request assistance  - Assess pain using appropriate pain scale  - Administer analgesics based on type and severity of pain and evaluate response  - Implement non-pharmacological measures as appropriate and evaluate response  - Consider cultural and social influences on pain and pain management  - Manage/alleviate anxiety  - Utilize distraction and/or relaxation techniques  - Monitor for opioid side effects  - Notify MD/LIP if interventions unsuccessful or patient reports new pain  - Anticipate increased pain with activity and pre-medicate as appropriate  Outcome: Progressing     Problem: RESPIRATORY - ADULT  Goal: Achieves optimal ventilation and oxygenation  Description: INTERVENTIONS:  - Assess for changes in respiratory status  - Assess for changes in mentation and behavior  - Position to facilitate oxygenation and minimize respiratory effort  - Oxygen supplementation based on oxygen saturation or ABGs  - Provide Smoking Cessation handout, if applicable  - Encourage broncho-pulmonary hygiene including cough, deep breathe, Incentive Spirometry  - Assess the need for suctioning and perform as needed  - Assess and instruct to report SOB or any respiratory difficulty  - Respiratory Therapy support as indicated  - Manage/alleviate anxiety  - Monitor for signs/symptoms of CO2 retention  Outcome: Progressing    Dozing on and off, c/o fatigue, generally weak, oriented x3, feel bloated and rating abd pain as moderate, requesting abd pleurx drain tonight, SBP is better, drained 1000 ml of cloudy yellowish output, abd pain is better afterwards, denies nausea at this time,   Poor appetite, Ivfluids, no SOB. Fall prec in place, will monitor. 0700:  Needed  a dose of dilaudid PO this am for breakthrough pain , up to the bathroom with 1 assist, joshua urine.  at the bedside.

## 2022-08-20 NOTE — PLAN OF CARE
NURSING ADMISSION NOTE      Patient admitted via Cart  Oriented to room. Safety precautions initiated. Bed in low position. Call light in reach. Received patient from ER, admitted for and weakness. Patient A+Ox4, lethargic but arousable. Data base completed. Patient c/o abdominal pain, PRN oxy IR given and ER. abdominal distended. Denies any SOB. C/o nausea and PRN Zofran given. Fall precaution in place. IVF per [facility].

## 2022-08-20 NOTE — OCCUPATIONAL THERAPY NOTE
OT orders received per functional mobility screening protocol. Chart reviewed and case discussed w/ PT. Patient is completing self-care and functional mobility with mod I to supervision level. Has appropriate equipment and supervision from . Patient presents with no immediate skilled OT needs at this time. Will sign off.

## 2022-08-20 NOTE — PLAN OF CARE
Patient A+Ox4. Continues to c/o abdominal pain, PRN meds given. Alternated between Zofran and compazine. On tle SR/ST. Ambulated halls and sat in chair today. Still feeling very fatigued. BP in upper 90's low 100's. Problem: PAIN - ADULT  Goal: Verbalizes/displays adequate comfort level or patient's stated pain goal  Description: INTERVENTIONS:  - Encourage pt to monitor pain and request assistance  - Assess pain using appropriate pain scale  - Administer analgesics based on type and severity of pain and evaluate response  - Implement non-pharmacological measures as appropriate and evaluate response  - Consider cultural and social influences on pain and pain management  - Manage/alleviate anxiety  - Utilize distraction and/or relaxation techniques  - Monitor for opioid side effects  - Notify MD/LIP if interventions unsuccessful or patient reports new pain  - Anticipate increased pain with activity and pre-medicate as appropriate  Outcome: Progressing     Problem: NEUROLOGICAL - ADULT  Goal: Achieves stable or improved neurological status  Description: INTERVENTIONS  - Assess for and report changes in neurological status  - Initiate measures to prevent increased intracranial pressure  - Maintain blood pressure and fluid volume within ordered parameters to optimize cerebral perfusion and minimize risk of hemorrhage  - Monitor temperature, glucose, and sodium.  Initiate appropriate interventions as ordered  Outcome: Progressing     Problem: GASTROINTESTINAL - ADULT  Goal: Minimal or absence of nausea and vomiting  Description: INTERVENTIONS:  - Maintain adequate hydration with IV or PO as ordered and tolerated  - Nasogastric tube to low intermittent suction as ordered  - Evaluate effectiveness of ordered antiemetic medications  - Provide nonpharmacologic comfort measures as appropriate  - Advance diet as tolerated, if ordered  - Obtain nutritional consult as needed  - Evaluate fluid balance  Outcome: Not Progressing  Goal: Maintains adequate nutritional intake (undernourished)  Description: INTERVENTIONS:  - Monitor percentage of each meal consumed  - Identify factors contributing to decreased intake, treat as appropriate  - Assist with meals as needed  - Monitor I&O, WT and lab values  - Obtain nutritional consult as needed  - Optimize oral hygiene and moisture  - Encourage food from home; allow for food preferences  - Enhance eating environment  Outcome: Not Progressing

## 2022-08-21 NOTE — PLAN OF CARE
Problem: PAIN - ADULT  Goal: Verbalizes/displays adequate comfort level or patient's stated pain goal  Description: INTERVENTIONS:  - Encourage pt to monitor pain and request assistance  - Assess pain using appropriate pain scale  - Administer analgesics based on type and severity of pain and evaluate response  - Implement non-pharmacological measures as appropriate and evaluate response  - Consider cultural and social influences on pain and pain management  - Manage/alleviate anxiety  - Utilize distraction and/or relaxation techniques  - Monitor for opioid side effects  - Notify MD/LIP if interventions unsuccessful or patient reports new pain  - Anticipate increased pain with activity and pre-medicate as appropriate  Outcome: Progressing     Problem: RESPIRATORY - ADULT  Goal: Achieves optimal ventilation and oxygenation  Description: INTERVENTIONS:  - Assess for changes in respiratory status  - Assess for changes in mentation and behavior  - Position to facilitate oxygenation and minimize respiratory effort  - Oxygen supplementation based on oxygen saturation or ABGs  - Provide Smoking Cessation handout, if applicable  - Encourage broncho-pulmonary hygiene including cough, deep breathe, Incentive Spirometry  - Assess the need for suctioning and perform as needed  - Assess and instruct to report SOB or any respiratory difficulty  - Respiratory Therapy support as indicated  - Manage/alleviate anxiety  - Monitor for signs/symptoms of CO2 retention  Outcome: Progressing    Assumed care at 1900. C/o feeling abdomen more distended again today, abd pleurx drained 1000 ml of cloudy yellowish fluids, pain meds given with  short relief, dozing on and off, fall precaution, endorsed to night RN.

## 2022-08-21 NOTE — PLAN OF CARE
Problem: PAIN - ADULT  Goal: Verbalizes/displays adequate comfort level or patient's stated pain goal  Description: INTERVENTIONS:  - Encourage pt to monitor pain and request assistance  - Assess pain using appropriate pain scale  - Administer analgesics based on type and severity of pain and evaluate response  - Implement non-pharmacological measures as appropriate and evaluate response  - Consider cultural and social influences on pain and pain management  - Manage/alleviate anxiety  - Utilize distraction and/or relaxation techniques  - Monitor for opioid side effects  - Notify MD/LIP if interventions unsuccessful or patient reports new pain  - Anticipate increased pain with activity and pre-medicate as appropriate  Outcome: Progressing   Pt continues to have pain in her abdomen. Taking scheduled Oxycontin and PRN Oxy IR. Reports draining her abd pleur-x has helped significantly with her pain management. Did request IV dilaudid this AM. Pt. Had felt she received IV dilaudid yesterday PM. Reviewed pt's order history with her and noted pt she received PO Dilaudid in the PM. Dr. Ange Elias paged as pt requesting IV dilaudid this am. Order received x 1. Dose given w/ reported relief. Dr. Ange Elias rounded this late am and orders received for PRN IV dilaudid. To try and manage pt's pain w/ Oral meds first. Pt. And  aware. Problem: METABOLIC/FLUID AND ELECTROLYTES - ADULT  Goal: Electrolytes maintained within normal limits  Description: INTERVENTIONS:  - Monitor labs and rhythm and assess patient for signs and symptoms of electrolyte imbalances  - Administer electrolyte replacement as ordered  - Monitor response to electrolyte replacements, including rhythm and repeat lab results as appropriate  - Fluid restriction as ordered  - Instruct patient on fluid and nutrition restrictions as appropriate  Outcome: Progressing   Pt's BP has been stable.  Denies dizziness w/ standing this am. Pt's AM labs w/ BUN 10, Cr 0.71, Na 128,K 4.1, Mg 2.4. discussed w/ concerns for draining her Pleur-x too frequently with changes in volume status. Orders received from Dr. Annetta Galo limiting the volume and frequency of drainage. Rationale d/w pt and pt's  per Dr. Annetta Galo. Repeat BMP ordered for this PM. Results w/ Sodium level of 130. Problem: GASTROINTESTINAL - ADULT  Goal: Minimal or absence of nausea and vomiting  Description: INTERVENTIONS:  - Maintain adequate hydration with IV or PO as ordered and tolerated  - Nasogastric tube to low intermittent suction as ordered  - Evaluate effectiveness of ordered antiemetic medications  - Provide nonpharmacologic comfort measures as appropriate  - Advance diet as tolerated, if ordered  - Obtain nutritional consult as needed  - Evaluate fluid balance  Outcome: Progressing   Pt. Encouraged to increase PO intake as tolerated. Dietitian consulted for supplement recommendations. Problem: NEUROLOGICAL - ADULT  Goal: Achieves stable or improved neurological status  Description: INTERVENTIONS  - Assess for and report changes in neurological status  - Initiate measures to prevent increased intracranial pressure  - Maintain blood pressure and fluid volume within ordered parameters to optimize cerebral perfusion and minimize risk of hemorrhage  - Monitor temperature, glucose, and sodium. Initiate appropriate interventions as ordered  Outcome: Progressing   Pt. Alert, oriented x 4. No episodes of lethargy, no dizziness, getting up w/ SBA 1. Pt's brother Roxanne Service at bedside.  Pt's father and mother-in-law visited this PM.

## 2022-08-22 NOTE — PLAN OF CARE
Patient  A+Ox4. Drowsy at times but easily arousable. Intermittent nausea, PRN Zofran and compazine given. Poor appetite today. C/o abdominal pain 4-5/10, PRN oxy IR given every 4 hours. Vital stable. Ambulated halls with  and sat in chair for a bit. Had small BM this shift. MD ordered 500 ml bag of 0.9NS at 83ml/hr. Also,  Neph consulted for low sodium. Seen pt ordered albumin x4 doses. Urine samples sent for tests. Pt c/o abdominal fullness at changed of shift. Wanted to be drained. Took 2L off and tolerated well. Fluid was cloudy/serous. Endorsed to oncoming nurse.      Problem: PAIN - ADULT  Goal: Verbalizes/displays adequate comfort level or patient's stated pain goal  Description: INTERVENTIONS:  - Encourage pt to monitor pain and request assistance  - Assess pain using appropriate pain scale  - Administer analgesics based on type and severity of pain and evaluate response  - Implement non-pharmacological measures as appropriate and evaluate response  - Consider cultural and social influences on pain and pain management  - Manage/alleviate anxiety  - Utilize distraction and/or relaxation techniques  - Monitor for opioid side effects  - Notify MD/LIP if interventions unsuccessful or patient reports new pain  - Anticipate increased pain with activity and pre-medicate as appropriate  Outcome: Progressing     Problem: GASTROINTESTINAL - ADULT  Goal: Minimal or absence of nausea and vomiting  Description: INTERVENTIONS:  - Maintain adequate hydration with IV or PO as ordered and tolerated  - Nasogastric tube to low intermittent suction as ordered  - Evaluate effectiveness of ordered antiemetic medications  - Provide nonpharmacologic comfort measures as appropriate  - Advance diet as tolerated, if ordered  - Obtain nutritional consult as needed  - Evaluate fluid balance  Outcome: Progressing     Problem: NEUROLOGICAL - ADULT  Goal: Achieves stable or improved neurological status  Description: INTERVENTIONS  - Assess for and report changes in neurological status  - Initiate measures to prevent increased intracranial pressure  - Maintain blood pressure and fluid volume within ordered parameters to optimize cerebral perfusion and minimize risk of hemorrhage  - Monitor temperature, glucose, and sodium.  Initiate appropriate interventions as ordered  Outcome: Progressing

## 2022-08-22 NOTE — PLAN OF CARE
Problem: PAIN - ADULT  Goal: Verbalizes/displays adequate comfort level or patient's stated pain goal  Description: INTERVENTIONS:  - Encourage pt to monitor pain and request assistance  - Assess pain using appropriate pain scale  - Administer analgesics based on type and severity of pain and evaluate response  - Implement non-pharmacological measures as appropriate and evaluate response  - Consider cultural and social influences on pain and pain management  - Manage/alleviate anxiety  - Utilize distraction and/or relaxation techniques  - Monitor for opioid side effects  - Notify MD/LIP if interventions unsuccessful or patient reports new pain  - Anticipate increased pain with activity and pre-medicate as appropriate  Outcome: Progressing     Problem: CARDIOVASCULAR - ADULT  Goal: Maintains optimal cardiac output and hemodynamic stability  Description: INTERVENTIONS:  - Monitor vital signs, rhythm, and trends  - Monitor for bleeding, hypotension and signs of decreased cardiac output  - Evaluate effectiveness of vasoactive medications to optimize hemodynamic stability  - Monitor arterial and/or venous puncture sites for bleeding and/or hematoma  - Assess quality of pulses, skin color and temperature  - Assess for signs of decreased coronary artery perfusion - ex. Angina  - Evaluate fluid balance, assess for edema, trend weights  Outcome: Progressing     Sleeping on and off,  requiring IV pain med in between her schedule oral pain med. occ nausea but no emesis, poor appetite. Generally weak. Abdominal pleurx catheter in place, dressing intact. V/S stable for the patient. No shortness of breath. POC cont.

## 2022-08-22 NOTE — CM/SW NOTE
Patient recently DC'ed from hospital and is current with [facility] services. SW sent resume of care referral to [facility] via [facility] for continued PT and RN services for Time Ashish. SW will continue to follow for plan of care changes and remain available for any additional DC needs or concerns.      Margaret Hoffman MSW, LSW  Discharge Planner   [phone]

## 2022-08-22 NOTE — PROGRESS NOTES
08/22/22 1840   Clinical Encounter Type   Visited With Patient and family together;Health care provider  ( and sister at bedside)   Continue Visiting Yes  (When paged to assist with HCPOA or for other spiritual care need)   Patient's Supportive Strategies/Resources family   Taxonomy   Intended Effects Demonstrate caring and concern   Methods Collaborate with care team member;Offer support   Interventions Ask guided questions; Acknowledge current situation;Assist someone with Advance Directives; Explain  role; Identify supportive relationship(s)   Responded to consult for advance directives. RN indicated that patient is decisional.  Patient stated that it might be better to discuss and possibly complete advance directives at another time and she appeared fatigued. Provided some education directed at  and left a blank form with him. Informed patient/ to let nursing know if/when they may be ready to pursue completion of HCPOA. For further spiritual care, please enter a consult in Epic, followed by contacting pager [phone] as needed.

## 2022-08-23 NOTE — PROGRESS NOTES
Received pt alert and orientated x4. Drowsy but easily arousable. C/o abdominal pain and nausea. PRNs given per [facility] with good relief. VSS. No sob on RA. Pt up with assistance ambulating to the bathroom. Poor PO intake. PO meds not taken, MD aware. All other meds given per [facility]. Family at the bedside. Updated. Fall precautions in place. Call light within reach.

## 2022-08-23 NOTE — PROGRESS NOTES
08/23/22 1358   Clinical Encounter Type   Visited With Patient and family together  ( and sister at bedside)   Routine Visit Follow-up   Continue Visiting No  (Family will request further ministry (via RN page [phone]) upon request)   Referral From    Referral To Nurse  (AD completed. Original to pt and copy to chart)   Family Spiritual Encounters   Family Coping Anxiety; Fearful;Non-verbal;Sadness   Family Participation in Care Consistently   Family Support During Treatment Consistently   Taxonomy   Intended Effects Helping someone feel comforted   Methods Demonstrate acceptance;Encourage end of life review;Explore presence of God;Offer emotional support; Offer spiritual/Episcopal support   Interventions Acknowledge current situation; Acknowledge response to difficult experience; Active listening; Share words of hope and inspiration;Provide compassionate touch; Invite someone to reminisce; Assist someone with Advance Directives

## 2022-08-23 NOTE — PLAN OF CARE
Continues to be nauseated vomited x 2 , Compazine at 2100 and reglan at 2330. Unable to tolerate oral meds, continues to have have pain after 0.5mg iv of dilaudid. MD contacted , new order for dilaudid 1mg and Reglan. Same given , unable to take eliquis , MD aware. 0.9NS @83cc/hr x 500cc given per order.  at bedside. Continues to be very nauseated , refusing all oral meds. Not sleeping, requested something for sleep. New order for klonopin sublingual; , same given with effect. dilaudid 1mg iv  At 3am    This am refused OxyContin , will \"try later\" , rescheduled for 8am. Reglan given, and will give iv dilaudid. Stated that klonopin sublingual was very effective. Dr Aleta Padilla notified of critical phosphorus, will address same. Dr Sabrina Benson notified of patients nausea and anxiety . Valium iv ordered.

## 2022-08-24 NOTE — PROGRESS NOTES
Received pt alert and orientated x4. VSS. No sob on RA. Afebrile. C/o abdominal pain. PRN given with good relief. C/o of nausea as well. PRN zofran given. Poor po intake. Electrolytes replaced per protocol. Up and ambulating to the bathroom. Pt c/o abdominal fullness. Wanted to be drained. Took 2L off and tolerated well. Fluid was cloudy/serous. Family at the bedside. Fall precautions in place. Call light within reach.

## 2022-08-24 NOTE — PROGRESS NOTES
A&Ox4, no oral meds given per MAR. Pt cannot tolerate. Complaint of pain. Pain meds given. Moderate relief. Family stayed overnight. POC discussed. With continue to monitor.

## 2022-08-25 NOTE — PROGRESS NOTES
Received pt alert and orientated x4. Drowsy. VSS. No sob on RA. Afebrile. C/o abdominal pain and nausea. PRNs given with good relief. Poor PO intake. Up and ambulating to the bathroom. Sitting up in the chair. Family at the bedside. Fall precautions in place. Call light within reach.

## 2022-08-25 NOTE — CM/SW NOTE
SW received consult order for [facility]. [facility]ITA called and spoke with N[facility] Durbin at [facility] who was already aware of consult. SW will continue to follow for plan of care changes and remain available for any additional DC needs or concerns.      Efraín Altamirano MSW, LSW  Discharge Planner   [phone]

## 2022-08-25 NOTE — HOSPICE RN NOTE
[facility] nursing visit for follow up on hospice consult order. Joint visit with Maryanne Zamorano RN, TNL. Met with , 2 daughters and 2 sisters. Discussed hospice benefit, hospice philosophy, palliative care with questions and concerns addressed. Hospice informational book was provided. End of life education provided. Patient appears appropriate for inpatient level of hospice care for management of her cancer related pain and symptoms. Leisa London was not ready to start hospice just yet. Family needs some time to absorb all information provided. Wants other family/friends to visit before starting hospice. [facility] will continue to follow along and admit patient to GIP level of hospice when family is ready. Please call [facility] with any question or concerns.     Allie Payne RN, [address] Liaison  [phone] after hours

## 2022-08-25 NOTE — PROGRESS NOTES
A&Ox4, VSS. Afebrile. Complaint of pain during night. Pain meds given with moderate relief. Room air. Family at bedside. Ambulates with assistance to bathroom. IVF. unable to tolerate oral meds due to nausea and emesis in beginning of shift. Compazine given. No further needs at this time.

## 2022-08-25 NOTE — HOSPICE RN NOTE
[facility] consult order received. [facility] will follow up with this patient and family today.     Bronwyn Suero RN, [address] Liaison  [phone] after hours

## 2022-08-26 NOTE — HOSPICE RN NOTE
[facility] revisit with spouse Polina Higginbothamon. Reviewed hospice vs palliative care with questions and concerns addressed. Polina Sylwia is adamant about starting hospice on Monday. [facility] liaison will continue to follow up daily with family for any questions or concerns. Patient is appropriate for inpatient level of hospice care and we will admit when family is ready. Please call [facility] with any questions or concerns.     Nayeli Pablo RN, [address] Liaison  [phone] after hours

## 2022-08-26 NOTE — PLAN OF CARE
Patient A&Ox4, reported pain 9/10, pain meds given per  [facility]. Patient reported nausea, antiemetic given per [facility]. Nausea resolved. IVF changed per MAR. Hospice had a meeting w/ today. Electrolytes given per MAR.     1200- PCA pump started per MAR. Patient reported pain 6/10 and stated \"pain feels better\". Port dressing & needle changed today. Problem: PAIN - ADULT  Goal: Verbalizes/displays adequate comfort level or patient's stated pain goal  Description: INTERVENTIONS:  - Encourage pt to monitor pain and request assistance  - Assess pain using appropriate pain scale  - Administer analgesics based on type and severity of pain and evaluate response  - Implement non-pharmacological measures as appropriate and evaluate response  - Consider cultural and social influences on pain and pain management  - Manage/alleviate anxiety  - Utilize distraction and/or relaxation techniques  - Monitor for opioid side effects  - Notify MD/LIP if interventions unsuccessful or patient reports new pain  - Anticipate increased pain with activity and pre-medicate as appropriate  Outcome: Progressing     Problem: CARDIOVASCULAR - ADULT  Goal: Maintains optimal cardiac output and hemodynamic stability  Description: INTERVENTIONS:  - Monitor vital signs, rhythm, and trends  - Monitor for bleeding, hypotension and signs of decreased cardiac output  - Evaluate effectiveness of vasoactive medications to optimize hemodynamic stability  - Monitor arterial and/or venous puncture sites for bleeding and/or hematoma  - Assess quality of pulses, skin color and temperature  - Assess for signs of decreased coronary artery perfusion - ex.  Angina  - Evaluate fluid balance, assess for edema, trend weights  Outcome: Progressing     Problem: METABOLIC/FLUID AND ELECTROLYTES - ADULT  Goal: Electrolytes maintained within normal limits  Description: INTERVENTIONS:  - Monitor labs and rhythm and assess patient for signs and symptoms of electrolyte imbalances  - Administer electrolyte replacement as ordered  - Monitor response to electrolyte replacements, including rhythm and repeat lab results as appropriate  - Fluid restriction as ordered  - Instruct patient on fluid and nutrition restrictions as appropriate  Outcome: Not Progressing     Problem: GASTROINTESTINAL - ADULT  Goal: Minimal or absence of nausea and vomiting  Description: INTERVENTIONS:  - Maintain adequate hydration with IV or PO as ordered and tolerated  - Nasogastric tube to low intermittent suction as ordered  - Evaluate effectiveness of ordered antiemetic medications  - Provide nonpharmacologic comfort measures as appropriate  - Advance diet as tolerated, if ordered  - Obtain nutritional consult as needed  - Evaluate fluid balance  Outcome: Progressing  Goal: Maintains adequate nutritional intake (undernourished)  Description: INTERVENTIONS:  - Monitor percentage of each meal consumed  - Identify factors contributing to decreased intake, treat as appropriate  - Assist with meals as needed  - Monitor I&O, WT and lab values  - Obtain nutritional consult as needed  - Optimize oral hygiene and moisture  - Encourage food from home; allow for food preferences  - Enhance eating environment  Outcome: Not Progressing     Problem: NEUROLOGICAL - ADULT  Goal: Achieves stable or improved neurological status  Description: INTERVENTIONS  - Assess for and report changes in neurological status  - Initiate measures to prevent increased intracranial pressure  - Maintain blood pressure and fluid volume within ordered parameters to optimize cerebral perfusion and minimize risk of hemorrhage  - Monitor temperature, glucose, and sodium.  Initiate appropriate interventions as ordered  Outcome: Progressing

## 2022-08-26 NOTE — HOSPICE RN NOTE
[facility] nursing visit for follow up on hospice consult. Joint visit with [facility]. Daughter and sisters at bedside. Spouse was called and placed on speaker phone. Discussed Palliative vs Hospice with questions and concerns addressed. Discussed use of dilaudid gtt for better management of her severe pain. Patient and family in agreement with starting dilaudid gtt. Spouse has further questions regarding hospice and this RN will meet with him later to discuss and answer questions. [facility] will continue to follow this patient and admit to inpatient hospice bed when family is ready. Please call [facility] with any questions or concerns.     Parker Mata RN, [address] Liaison  [phone] after hours

## 2022-08-26 NOTE — PLAN OF CARE
Pt a/o x4. ST in the 120s. C/o pain & nausea- prn dilaudid & zofran given. Pt requesting prn valium, given. No emesis. Meds per MAR. IVF infusing. Pt requesting pleurx to be drained. 2L drained. Output yellow cloudy. Pt reported pain relief following drainage. Fall risk protocol followed, call light within reach.     Problem: PAIN - ADULT  Goal: Verbalizes/displays adequate comfort level or patient's stated pain goal  Description: INTERVENTIONS:  - Encourage pt to monitor pain and request assistance  - Assess pain using appropriate pain scale  - Administer analgesics based on type and severity of pain and evaluate response  - Implement non-pharmacological measures as appropriate and evaluate response  - Consider cultural and social influences on pain and pain management  - Manage/alleviate anxiety  - Utilize distraction and/or relaxation techniques  - Monitor for opioid side effects  - Notify MD/LIP if interventions unsuccessful or patient reports new pain  - Anticipate increased pain with activity and pre-medicate as appropriate  Outcome: Progressing     Problem: RESPIRATORY - ADULT  Goal: Achieves optimal ventilation and oxygenation  Description: INTERVENTIONS:  - Assess for changes in respiratory status  - Assess for changes in mentation and behavior  - Position to facilitate oxygenation and minimize respiratory effort  - Oxygen supplementation based on oxygen saturation or ABGs  - Provide Smoking Cessation handout, if applicable  - Encourage broncho-pulmonary hygiene including cough, deep breathe, Incentive Spirometry  - Assess the need for suctioning and perform as needed  - Assess and instruct to report SOB or any respiratory difficulty  - Respiratory Therapy support as indicated  - Manage/alleviate anxiety  - Monitor for signs/symptoms of CO2 retention  Outcome: Progressing

## 2022-08-27 NOTE — HOSPICE RN NOTE
[facility] follow up visit. Pt daughter is at the bedside and her sister. No questions or concerns today. Plan is to still admit Monday as GIP. Advised to call with any further questions or concerns.

## 2022-08-27 NOTE — PLAN OF CARE
Lethargy. Pain control with pca. Poor po intake. Scheduled zofran given. Requested pleurx to be drained for comfort. 1 L drained. Breathing easy with saturation 100 % on room air. No respiratory distress observed. Fall precaution maintained.     Problem: PAIN - ADULT  Goal: Verbalizes/displays adequate comfort level or patient's stated pain goal  Description: INTERVENTIONS:  - Encourage pt to monitor pain and request assistance  - Assess pain using appropriate pain scale  - Administer analgesics based on type and severity of pain and evaluate response  - Implement non-pharmacological measures as appropriate and evaluate response  - Consider cultural and social influences on pain and pain management  - Manage/alleviate anxiety  - Utilize distraction and/or relaxation techniques  - Monitor for opioid side effects  - Notify MD/LIP if interventions unsuccessful or patient reports new pain  - Anticipate increased pain with activity and pre-medicate as appropriate  Outcome: Progressing

## 2022-08-29 ENCOUNTER — HOSPITAL ENCOUNTER (INPATIENT)
Facility: HOSPITAL | Age: 57
End: 2022-08-29
Attending: INTERNAL MEDICINE | Admitting: INTERNAL MEDICINE
Payer: OTHER MISCELLANEOUS

## 2022-08-29 PROBLEM — C18.9 COLON CANCER METASTASIZED TO MULTIPLE SITES (HCC): Status: ACTIVE | Noted: 2022-08-29

## 2022-08-29 PROBLEM — C18.9 COLON CANCER METASTASIZED TO MULTIPLE SITES (HCC): Status: ACTIVE | Noted: 2022-01-01

## 2022-08-29 NOTE — PLAN OF CARE
Remains on room air. Pain control with pca. Had a bowel movement overnight. Up to the commode with 1 assisted. Needs attended.  at the bedside.    Problem: PAIN - ADULT  Goal: Verbalizes/displays adequate comfort level or patient's stated pain goal  Description: INTERVENTIONS:  - Encourage pt to monitor pain and request assistance  - Assess pain using appropriate pain scale  - Administer analgesics based on type and severity of pain and evaluate response  - Implement non-pharmacological measures as appropriate and evaluate response  - Consider cultural and social influences on pain and pain management  - Manage/alleviate anxiety  - Utilize distraction and/or relaxation techniques  - Monitor for opioid side effects  - Notify MD/LIP if interventions unsuccessful or patient reports new pain  - Anticipate increased pain with activity and pre-medicate as appropriate  Outcome: Progressing

## 2022-08-29 NOTE — HOSPICE RN NOTE
[facility] here to follow up on admission to inpatient hospice. Had meeting set up with spouse at 6 am. Radha Elena not here at bedside. Per daughters they will have him call us when ready for hospice SOC. Please call [facility] with any questions or concerns. [facility] will continue to follow.      Canelo Gonzalez RN, [address] [facility] Liaison  [phone] after hours

## 2022-08-29 NOTE — HOSPICE RN NOTE
[facility] nursing follow up on [facility] for inpatient hospice. Called spouse Javid Briggs by phone. No answer. Left message to call hospice when ready to move forward with hospice admission. Please call [facility] with any questions or concerns.     Parker Mata RN, [address] [facility] Liaison  [phone] after hours

## 2022-08-29 NOTE — PROGRESS NOTES
Received pt alert and orientated x4. Drowsy at times. VSS. No sob on RA. Afebrile. No c/o pain. On PCA and comfortable at this time. C/o nausea scheduled meds given per MAR. Up to the commode. Had BM. Poor PO intake. Family at the bedside. Fall precautions in place. Call light within reach.

## 2022-08-29 NOTE — HOSPICE RN NOTE
[facility] nursing visit for follow up on hospice consult order. Patient appropriate for inpatient level of hospice care for management of severe pain, anxiety, and end of life symptoms. [facility] met with this patient and family last week. Was not ready to proceed with hospice until today. Consents for hospice signed by spouse. Contacted Dr. Ileana Rush and [facility] medical director Dr. Gareth Arora for initial hospice orders. Orders received and entered. Patient will be transitioned to inpatient hospice bed. [facility] will continue to follow this patient closely for end of life pain and symptom management and to support family. Please call [facility] with any questions or concerns.     Canelo Gonzalez RN, [address] Liaison  [phone] after hours

## 2022-08-30 NOTE — PLAN OF CARE
Received pt alert and orientated x4. Pain is well managed with PCA pump. No c/o nausea or vomiting. Enjoying small amounts of food and beverages. Sitting up in the chair for most of the day. Back in bed. Plurex drained for comfort per patient request; 2L removed. Repositioned for comfort. Family at the bedside. Call light within reach.      Problem: PAIN - ADULT  Goal: Verbalizes/displays adequate comfort level or patient's stated pain goal  Description: INTERVENTIONS:  - Encourage pt to monitor pain and request assistance  - Assess pain using appropriate pain scale  - Administer analgesics based on type and severity of pain and evaluate response  - Implement non-pharmacological measures as appropriate and evaluate response  - Consider cultural and social influences on pain and pain management  - Manage/alleviate anxiety  - Utilize distraction and/or relaxation techniques  - Monitor for opioid side effects  - Notify MD/LIP if interventions unsuccessful or patient reports new pain  - Anticipate increased pain with activity and pre-medicate as appropriate  Outcome: Progressing

## 2022-08-30 NOTE — HOSPICE RN NOTE
[facility] inpatient nursing visit. Daughters at bedside. No questions or concerns at this time. Patient on hydromorphone gtt infusing at 0.4mg/hr. PCA dose 0.2 mg every 15 minutes as needed for breakthrough pain. Requiring ondansetron 4 mg IVP every 8 hrs scheduled dose and every 6 hrs as needed for N/V. Patient remains appropriate for inpatient level of hospice care. Needs continued frequent nursing assessments for administration and titration of IV comfort medications. Unable to tolerate PO due to N/V issues. [facility] will continue to follow this patient closely for end of life pain and symptom management and to support family. Please call [facility] with any questions or concerns.     Canelo Gonzalez RN, [address] Liaison  [phone] after hours

## 2022-08-30 NOTE — PAYOR COMM NOTE
Discharge Notification    Patient Name: Uri Villasenor  Payor: MARI HAGAN  Subscriber #: [id number]  Authorization Number: [id number]  Admit Date/Time: 8/19/2022 10:43 AM  Discharge Date/Time: 8/29/2022 5:24 PM

## 2022-08-30 NOTE — PLAN OF CARE
Alert and comfortable. Pain control with pca pump. Denies nausea/ vomiting. Drinking well. Up to the commode with 1 person assisted. Made comfortable. Needs attended.    Problem: PAIN - ADULT  Goal: Verbalizes/displays adequate comfort level or patient's stated pain goal  Description: INTERVENTIONS:  - Encourage pt to monitor pain and request assistance  - Assess pain using appropriate pain scale  - Administer analgesics based on type and severity of pain and evaluate response  - Implement non-pharmacological measures as appropriate and evaluate response  - Consider cultural and social influences on pain and pain management  - Manage/alleviate anxiety  - Utilize distraction and/or relaxation techniques  - Monitor for opioid side effects  - Notify MD/LIP if interventions unsuccessful or patient reports new pain  - Anticipate increased pain with activity and pre-medicate as appropriate  Outcome: Progressing

## 2022-08-31 NOTE — PLAN OF CARE
Received pt alert and orientated x4. Pain is well managed with PCA pump. Pt tolerating sitting up in the chair. Enjoying small amounts of food and beverages. C/o of mild indigestion. PRN tums given with good relief. Family at the bedside. Fall precautions in place. Call light within reach.      Problem: PAIN - ADULT  Goal: Verbalizes/displays adequate comfort level or patient's stated pain goal  Description: INTERVENTIONS:  - Encourage pt to monitor pain and request assistance  - Assess pain using appropriate pain scale  - Administer analgesics based on type and severity of pain and evaluate response  - Implement non-pharmacological measures as appropriate and evaluate response  - Consider cultural and social influences on pain and pain management  - Manage/alleviate anxiety  - Utilize distraction and/or relaxation techniques  - Monitor for opioid side effects  - Notify MD/LIP if interventions unsuccessful or patient reports new pain  - Anticipate increased pain with activity and pre-medicate as appropriate  Outcome: Progressing

## 2022-08-31 NOTE — CM/SW NOTE
TITA met with pt and spouse, Hermelinda Matamoros. Discussed status of pt,  She stated that her pain was better - no additional questions .

## 2022-08-31 NOTE — DIETARY NOTE
Clinical Nutrition  Nutrition referral was triggered based on low BMI. Inpatient Comfort Care Order Set noted. Nutrition services will sign off at this time. Re-consult RD if further nutrition care is needed.   Jodee Mccollum, [address], [address] [place] [address]   Clinical Dietitian  Spectra: [phone]

## 2022-08-31 NOTE — HOSPICE RN NOTE
Pt received in bed sleeping with eyes slightly open. No family bedside. Johny Sinha RN relates 2 L fluid removed via pleurex catheter and that pt usually naps following procedure. Family met in family lounge. All questions answered. No further needs at this time.     Johanna Franco RN  [facility] TNL/SOC RN  [phone]

## 2022-08-31 NOTE — SPIRITUAL CARE NOTE
[facility] Chaplain visited pr room to offer initial spiritual care assessment. Two dtrs were bedside. Hsd was away att the time. Pt was ANOx3 and able to express her needs and speak about her journey. Pt is focused on finding peace/acceptance with her cancer and eol. Pt stated she was not in pain and appeared comfortable aeb by facial grimace or other body cues. The room was joyful and upbeat as dtrs and pt shared humor. Pt is [place] but no Christian home. No issues or needs at this time.     [facility]  [phone]

## 2022-08-31 NOTE — HOSPICE RN NOTE
[facility] inpatient nursing rounds. Daughter just left for the night. Patient drowsy but alert enough for simple questions. Denies any discomfort at this time. Continues on hydromorphone gtt for management of her severe pain. On scheduled IVP nausea medications as unable to take PO. Remains appropriate for inpatient level of hospice care. Needs continued frequent nursing assessments for administration and titration of IV comfort medications. [facility] will continue to follow this patient closely for end of life symptom management and to support family. Please call [facility] with any questions or concerns.     Lilliam Hendrickson RN, [address] [facility] Liaison  [phone] after hours

## 2022-09-01 NOTE — CM/SW NOTE
SW met with pt and spouse and children. Pt requesting a drain due to being filled up with fluids in hr stomach. Provided support to family, offered massage therapy tomorrow.

## 2022-09-01 NOTE — PLAN OF CARE
Pt a/o x4. Pt reports pain is controlled w/ PCA dilaudid pump. Meds per MAR.  at bedside. Fall risk protocol followed, call light within reach.      Problem: PAIN - ADULT  Goal: Verbalizes/displays adequate comfort level or patient's stated pain goal  Description: INTERVENTIONS:  - Encourage pt to monitor pain and request assistance  - Assess pain using appropriate pain scale  - Administer analgesics based on type and severity of pain and evaluate response  - Implement non-pharmacological measures as appropriate and evaluate response  - Consider cultural and social influences on pain and pain management  - Manage/alleviate anxiety  - Utilize distraction and/or relaxation techniques  - Monitor for opioid side effects  - Notify MD/LIP if interventions unsuccessful or patient reports new pain  - Anticipate increased pain with activity and pre-medicate as appropriate  Outcome: Progressing

## 2022-09-01 NOTE — HOSPICE RN NOTE
[facility] Rounds. [facility] RN assessment of patient at bedside. Lung sounds diminished throughout upon auscultation. Respirations 18, shallow, on room air. Patient awake, alert, PPS 30%. Pain 3/10 patient verbal report. Patient receiving dilaudid gtt 0.4mg/hr IV continuous with PCA doses 0.2mg IV Q15min PRN and zofran 4mg IVP Q8hr and PRN by facility RN for pain, shortness of breath, restlessness, and nausea not controlled with oral medications. Update given to family at bedside and all questions and concerns related to patient comfort and plan of care addressed and discussed in length. Update given to Aliyah Spencer, RN and Kirby العلي RN who is in agreement with plan of care and will continue to administer, monitor, and titrate IV medications as indicated. [facility] continuing to follow up to determine patient appropriateness for inpatient hospice care.     Bob Sharpe, [facility] TNL  After hours: [phone]  Office: [phone]

## 2022-09-01 NOTE — HOSPICE RN NOTE
[facility] inpatient nursing rounds. Spouse and daughters remain at bedside. Patient complaining of full abdomen. Asking if her abdomen can be drained. Hospital nurses aware. Patient continues on dilaudid gtt at 0.4 mg/hr with PRN PCA bolus doses available. [facility] will continue to follow this patient closely for end of life symptom management and to support family. Patient agreed to [facility] massage therapy. Will place MT order. Please call [facility] with any questions or concerns addressed.      Saundra Blankenship RN, [address] Liaison  [phone] after hours

## 2022-09-01 NOTE — HOSPICE RN NOTE
[facility] inpatient nursing visit. Spouse and daughters at bedside. Questions and concerns addressed. Patient awake, alert, able to answer simple questions. Currently on hydromorphone gtt infusing at 0.4 mg/hr for management of her pain. May have PCA bolus dose 0.2 mg every 15 minutes as needed for breakthrough pain. Also on scheduled doses IVP dexamethasone and ondansetron for management of her intractable N/V. Remains appropriate for inpatient level of hospice care. Needs continued frequent nursing assessments for administration and titration of IV comfort medications. [facility] will continue to follow this patient closely for end of life symptom management and to support family. Please call [facility] with any questions or concerns.       Batsheva Huang RN, [address] Liaison  [phone] after hours Depressed/Irritable/Angry

## 2022-09-02 NOTE — PLAN OF CARE
Problem: PAIN - ADULT  Goal: Verbalizes/displays adequate comfort level or patient's stated pain goal  Description: INTERVENTIONS:  - Encourage pt to monitor pain and request assistance  - Assess pain using appropriate pain scale  - Administer analgesics based on type and severity of pain and evaluate response  - Implement non-pharmacological measures as appropriate and evaluate response  - Consider cultural and social influences on pain and pain management  - Manage/alleviate anxiety  - Utilize distraction and/or relaxation techniques  - Monitor for opioid side effects  - Notify MD/LIP if interventions unsuccessful or patient reports new pain  - Anticipate increased pain with activity and pre-medicate as appropriate  Outcome: Progressing     Problem: PAIN - ADULT  Goal: Verbalizes/displays adequate comfort level or patient's stated pain goal  Description: INTERVENTIONS:  - Encourage pt to monitor pain and request assistance  - Assess pain using appropriate pain scale  - Administer analgesics based on type and severity of pain and evaluate response  - Implement non-pharmacological measures as appropriate and evaluate response  - Consider cultural and social influences on pain and pain management  - Manage/alleviate anxiety  - Utilize distraction and/or relaxation techniques  - Monitor for opioid side effects  - Notify MD/LIP if interventions unsuccessful or patient reports new pain  - Anticipate increased pain with activity and pre-medicate as appropriate  Outcome: Progressing

## 2022-09-02 NOTE — PROGRESS NOTES
Continue on hospice care, Pca dilaudid at regulated rates,Pain has been okay,Rt. pleurx cath was drained and removed 1500 ml w/ yellow output. Has poor appetite,Family at bedside,Assisted needs.

## 2022-09-02 NOTE — HOSPICE RN NOTE
[facility] inpatient nursing visit. [facility]age Therapist at bedside providing patient with massage. Daughter at bedside. Questions and concerns addressed. Patient continues on hydromorphone gtt at 0.4 mg/hr. Patient states she intermittently uses the PCA button. Complains of minimal pain at this time. Educated on hydromorphone use for her pain. Also requiring IVP ondansetron and IVP dexamethasone for intractable N/V. Remains appropriate for inpatient level of hospice care. Needs continued frequent nursing assessments for administration and titration of IV comfort medications. [facility] will continue to follow this patient closely for end of life symptom management and to support family. Please call [facility] with any questions or concerns.     Allie Payne RN, [address] [facility] Liaison  [phone] after hours

## 2022-09-02 NOTE — HOSPICE RN NOTE
[facility] inpatient nursing rounds. Spouse and daughter remain at bedside. Questions and concerns addressed. End of life education provided. Patient continues on hydromorphone gtt at 0.4 mg/hr. Required IVP decadron and IVP ondansetron for management of intractable N/V. 1.5 liters fluid removed off of abdomen today. Appears weaker today and sleeping more. Patient with no objective signs of distress at this time. Remains appropriate for inpatient level of hospice care. Needs continued frequent nursing assessments for administration and titration of IV comfort medications. [facility] will continue to follow this patient closely for end of life symptom management and to support family. Please call [facility] with any questions or concerns.     Jin De Los Santos RN, [address] [facility] [address] Liaison  [phone] after hours

## 2022-09-02 NOTE — PLAN OF CARE
Pt a/o x4. Meds per MAR. PCA dilaudid per MAR. Pt reports pain is controlled at the moment. No s/sx of distress at this time.  @ bedside. Fall risk protocol followed, call light within reach.      Problem: PAIN - ADULT  Goal: Verbalizes/displays adequate comfort level or patient's stated pain goal  Description: INTERVENTIONS:  - Encourage pt to monitor pain and request assistance  - Assess pain using appropriate pain scale  - Administer analgesics based on type and severity of pain and evaluate response  - Implement non-pharmacological measures as appropriate and evaluate response  - Consider cultural and social influences on pain and pain management  - Manage/alleviate anxiety  - Utilize distraction and/or relaxation techniques  - Monitor for opioid side effects  - Notify MD/LIP if interventions unsuccessful or patient reports new pain  - Anticipate increased pain with activity and pre-medicate as appropriate  Outcome: Progressing

## 2022-09-02 NOTE — PROGRESS NOTES
09/02/22 1816   Clinical Encounter Type   Visited With Patient and family together  (, Andrzej Ritter, and 2 adult daughters at bedside)   Routine Visit Introduction   Continue Visiting No  (For further ministry, pt/family will ask the RN to page [phone])   Crisis Visit Critical care   Family Spiritual Encounters   Family Coping Accepting; Anxiety; Fearful; Sadness   Family Participation in Care Consistently   Family Support During Treatment Consistently   Taxonomy   Intended Effects Demonstrate caring and concern   Methods Encourage story-telling; Offer emotional support; Offer spiritual/Taoism support   Interventions Acknowledge current situation; Acknowledge response to difficult experience; Active listening; Invite someone to reminisce;Provide compassionate touch;[place]; Share words of hope and inspiration

## 2022-09-02 NOTE — CM/SW NOTE
SW met with pt and family - discussed status of pt . Massage therapy came today. Answered questions , and discussed symptoms . Pt to continue to receive medications to address symptoms.

## 2022-09-03 NOTE — HOSPICE RN NOTE
[facility] inpatient nursing rounds. Spouse and daughters at bedside. Patient on face time with friend. No questions or concerns at this time. No change in medications today. Patient remains on hydromorphone gtt at 0.4 mg/hr. No complaints at this time. Patient remains appropriate for inpatient level of hospice care for management of intractable pain and N/V. [facility] will continue to follow this patient closely for end of life symptom management and to support family. Please call [facility] with any questions or concerns.     Tommy Land RN, [address] Liaison  [phone] after hours

## 2022-09-03 NOTE — HOSPICE RN NOTE
Inpatient hospice nursing rounds. Pt  and extended family are at the bedside. All questions and concerns addressed. Pt states her pain is under control, she did have to use the PCA button once this morning for breakthrough pain but it helped. Patient continues on hydromorphone drip at 0.4mg/hour. Pt states her N/V is under control as well. Talked about use of stool softener as she states she is not moving her bowels everyday. Prior to admission  states pt was taking senna on a regular basis. Spoke with Dr. Carrie Espinosa regarding this and senna will be ordered. I did address with pt and family possibility of going home with hospice with the use of CADD pump to help continue manage her pain. She will talk it over with family. Pt remains inpatient appropriate for frequent monitoring and titration of IV medications. POC discussed with Kishan Cruz RN. Please call [facility] for any questions or concerns.

## 2022-09-03 NOTE — PLAN OF CARE
Problem: NEUROLOGICAL - ADULT  Goal: Achieves stable or improved neurological status  Description: INTERVENTIONS  - Assess for and report changes in neurological status  - Initiate measures to prevent increased intracranial pressure  - Maintain blood pressure and fluid volume within ordered parameters to optimize cerebral perfusion and minimize risk of hemorrhage  - Monitor temperature, glucose, and sodium.  Initiate appropriate interventions as ordered  Outcome: Progressing     Problem: GASTROINTESTINAL - ADULT  Goal: Minimal or absence of nausea and vomiting  Description: INTERVENTIONS:  - Maintain adequate hydration with IV or PO as ordered and tolerated  - Nasogastric tube to low intermittent suction as ordered  - Evaluate effectiveness of ordered antiemetic medications  - Provide nonpharmacologic comfort measures as appropriate  - Advance diet as tolerated, if ordered  - Obtain nutritional consult as needed  - Evaluate fluid balance  Outcome: Not Progressing     Problem: GASTROINTESTINAL - ADULT  Goal: Maintains or returns to baseline bowel function  Description: INTERVENTIONS:  - Assess bowel function  - Maintain adequate hydration with IV or PO as ordered and tolerated  - Evaluate effectiveness of GI medications  - Encourage mobilization and activity  - Obtain nutritional consult as needed  - Establish a toileting routine/schedule  - Consider collaborating with pharmacy to review patient's medication profile  Outcome: Not Progressing

## 2022-09-03 NOTE — PLAN OF CARE
Alert and has occasional short term memory loss, easily redirected. Tolerable pain, still on PCA pump. Abdomen is rounded and distended, requested to be drained today, obtained 2.1 liters of milky ascitic acid, blood pressure was stable during the entire procedure. Fluids are milky in consistency. Pleurx site is clean, sutures are in place, denies pain to site. Family at bedside. Problem: PAIN - ADULT  Goal: Verbalizes/displays adequate comfort level or patient's stated pain goal  Description: INTERVENTIONS:  - Encourage pt to monitor pain and request assistance  - Assess pain using appropriate pain scale  - Administer analgesics based on type and severity of pain and evaluate response  - Implement non-pharmacological measures as appropriate and evaluate response  - Consider cultural and social influences on pain and pain management  - Manage/alleviate anxiety  - Utilize distraction and/or relaxation techniques  - Monitor for opioid side effects  - Notify MD/LIP if interventions unsuccessful or patient reports new pain  - Anticipate increased pain with activity and pre-medicate as appropriate  Outcome: Progressing     Problem: NEUROLOGICAL - ADULT  Goal: Achieves stable or improved neurological status  Description: INTERVENTIONS  - Assess for and report changes in neurological status  - Initiate measures to prevent increased intracranial pressure  - Maintain blood pressure and fluid volume within ordered parameters to optimize cerebral perfusion and minimize risk of hemorrhage  - Monitor temperature, glucose, and sodium.  Initiate appropriate interventions as ordered  9/3/2022 1508 by Lynn Bailey RN  Outcome: Progressing  9/3/2022 1507 by Lynn Bailey RN  Outcome: Progressing     Problem: GASTROINTESTINAL - ADULT  Goal: Minimal or absence of nausea and vomiting  Description: INTERVENTIONS:  - Maintain adequate hydration with IV or PO as ordered and tolerated  - Nasogastric tube to low intermittent suction as ordered  - Evaluate effectiveness of ordered antiemetic medications  - Provide nonpharmacologic comfort measures as appropriate  - Advance diet as tolerated, if ordered  - Obtain nutritional consult as needed  - Evaluate fluid balance  9/3/2022 1508 by Mele Marmolejo RN  Outcome: Not Progressing  9/3/2022 1507 by Mele Marmolejo RN  Outcome: Not Progressing     Problem: GASTROINTESTINAL - ADULT  Goal: Maintains or returns to baseline bowel function  Description: INTERVENTIONS:  - Assess bowel function  - Maintain adequate hydration with IV or PO as ordered and tolerated  - Evaluate effectiveness of GI medications  - Encourage mobilization and activity  - Obtain nutritional consult as needed  - Establish a toileting routine/schedule  - Consider collaborating with pharmacy to review patient's medication profile  9/3/2022 1508 by Mele Marmolejo RN  Outcome: Not Progressing  9/3/2022 1507 by Mele Marmoleoj RN  Outcome: Not Progressing

## 2022-09-03 NOTE — PLAN OF CARE
Problem: PAIN - ADULT  Goal: Verbalizes/displays adequate comfort level or patient's stated pain goal  Description: INTERVENTIONS:  - Encourage pt to monitor pain and request assistance  - Assess pain using appropriate pain scale  - Administer analgesics based on type and severity of pain and evaluate response  - Implement non-pharmacological measures as appropriate and evaluate response  - Consider cultural and social influences on pain and pain management  - Manage/alleviate anxiety  - Utilize distraction and/or relaxation techniques  - Monitor for opioid side effects  - Notify MD/LIP if interventions unsuccessful or patient reports new pain  - Anticipate increased pain with activity and pre-medicate as appropriate  Outcome: Progressing     Problem: NEUROLOGICAL - ADULT  Goal: Achieves stable or improved neurological status  Description: INTERVENTIONS  - Assess for and report changes in neurological status  - Initiate measures to prevent increased intracranial pressure  - Maintain blood pressure and fluid volume within ordered parameters to optimize cerebral perfusion and minimize risk of hemorrhage  - Monitor temperature, glucose, and sodium. Initiate appropriate interventions as ordered  Outcome: Progressing     Currently denies pain, appeared comfortable resting in bed, spouse at the bedside, ON PCA dilaudid for pain control. Pleurx  to right abdomen in place, dressing  dry and intact. Hospice care.

## 2022-09-03 NOTE — PROGRESS NOTES
Patient continues to be on hospice care, patient denies any pain, on dilaudid pca. Patient denies need for pleurex to be drained, drained yesterday of 1500cc   Patient munching on chips and chewing gum, patient appears comfortable, no coughing or respiratory difficulty noted.

## 2022-09-04 NOTE — HOSPICE RN NOTE
Inpatient hospice nursing rounds. Pt is in bed, states pain is tolerable still today. All questions and concerns addressed with pt and family. [facility] will continue to monitor pt pain with dilaudid drip and prepare for possible discharge home during the week. Pt remains inpatient appropriate at this time for titration of pain medications for pain management.

## 2022-09-04 NOTE — HOSPICE RN NOTE
Inpatient hospice nursing rounds. Pt  and extended family are at the bedside. All questions and concerns addressed. Pt is having some difficulty with expressing her needs and finding the right words. Pt  states when she remembers certain things she wants to address he will write them down and address them with hospice. Pt states her pain is under control. Patient continues on hydromorphone drip at 0.4mg/hour. Pt states her N/V is under control as well. Senna was started yesterday, no BM since 9/1 but pt states she can feel it coming on today, bowel sounds are positive in all quadrants. I did address with pt and family possibility of going home with hospice with the use of CADD pump to help continue manage her pain. She will talk it over with family. Pt remains inpatient appropriate for frequent monitoring and titration of IV medications. POC discussed with Yaneth Mendoza RN. Please call [facility] for any questions or concerns.

## 2022-09-04 NOTE — PLAN OF CARE
Still on IV PCA, pain is controlled, 3/10, awake for the most part, drowsy at times, answers to most questions appropriately, has on and off nausea, on scheduled Zofran and Reglan as needed. Still no BM for 3-4 days, passing gas Family at bedside.    Problem: PAIN - ADULT  Goal: Verbalizes/displays adequate comfort level or patient's stated pain goal  Description: INTERVENTIONS:  - Encourage pt to monitor pain and request assistance  - Assess pain using appropriate pain scale  - Administer analgesics based on type and severity of pain and evaluate response  - Implement non-pharmacological measures as appropriate and evaluate response  - Consider cultural and social influences on pain and pain management  - Manage/alleviate anxiety  - Utilize distraction and/or relaxation techniques  - Monitor for opioid side effects  - Notify MD/LIP if interventions unsuccessful or patient reports new pain  - Anticipate increased pain with activity and pre-medicate as appropriate  Outcome: Progressing     Problem: GASTROINTESTINAL - ADULT  Goal: Minimal or absence of nausea and vomiting  Description: INTERVENTIONS:  - Maintain adequate hydration with IV or PO as ordered and tolerated  - Nasogastric tube to low intermittent suction as ordered  - Evaluate effectiveness of ordered antiemetic medications  - Provide nonpharmacologic comfort measures as appropriate  - Advance diet as tolerated, if ordered  - Obtain nutritional consult as needed  - Evaluate fluid balance  Outcome: Not Progressing     Problem: GASTROINTESTINAL - ADULT  Goal: Maintains or returns to baseline bowel function  Description: INTERVENTIONS:  - Assess bowel function  - Maintain adequate hydration with IV or PO as ordered and tolerated  - Evaluate effectiveness of GI medications  - Encourage mobilization and activity  - Obtain nutritional consult as needed  - Establish a toileting routine/schedule  - Consider collaborating with pharmacy to review patient's medication profile  Outcome: Not Progressing

## 2022-09-04 NOTE — PLAN OF CARE
Problem: GASTROINTESTINAL - ADULT  Goal: Minimal or absence of nausea and vomiting  Description: INTERVENTIONS:  - Maintain adequate hydration with IV or PO as ordered and tolerated  -  Problem: PAIN - ADULT  Goal: Verbalizes/displays adequate comfort level or patient's stated pain goal  Description: INTERVENTIONS:  - Encourage pt to monitor pain and request assistance  - Assess pain using appropriate pain scale  - Administer analgesics based on type and severity of pain and evaluate response  - Implement non-pharmacological measures as appropriate and evaluate response  - Consider cultural and social influences on pain and pain management  - Manage/alleviate anxiety  - Utilize distraction and/or relaxation techniques  - Monitor for opioid side effects  - Notify MD/LIP if interventions unsuccessful or patient reports new pain  - Anticipate increased pain with activity and pre-medicate as appropriate  Outcome: Progressing   - Evaluate effectiveness of ordered antiemetic medications  - Provide nonpharmacologic comfort measures as appropriate  - Advance diet as tolerated, if ordered  - Obtain nutritional consult as needed  - Evaluate fluid balance  Outcome: Progressing      Drowsy on and off, pain is currently  controlled  with current PCA,  on scheduled zofan, multiple visitors earlier of the shift. BP on the 90's,  no s/s of respiratory distress, Hospice care. Spouse at the bedside. 0600 up to the bedside commode, urinated moderate amount of joshua urine.

## 2022-09-05 NOTE — HOSPICE RN NOTE
[facility] Rounds. [facility] RN assessment of patient at bedside. Lung sounds diminished throughout upon ausculation. Respirations 14, shallow, on room air. Patient oriented, drowsy, PPS 30%. Still complaining of pain 4-5/10 and has received frequent PCA doses. Patient still on dilaudid gtt 0.4mg/hr IV continuous with frequent doses dilaudid 0.2mg IVP Q15min PRN managed by facility RN for pain, shortness of breath, and restlessness. Update given to patient and spouse at bedside and all questions and concerns regarding PCA pump and titration addressed and discussed in length. Spouse Doris Sicard and patient both in agreement for titration to dilaudid gtt 0.6mg/hr IV continuous if pain is not managed with PCA boluses this evening. Update given to [facility], RN, who is in agreement with plan of care and will evaluate and titrate dilaudid gtt as indicated. [facility] continuing to follow up to determine patient appropriateness for inpatient hospice care.     Corrina Cote, [facility] [place]  After hours: [phone]  Office: [phone]

## 2022-09-05 NOTE — PLAN OF CARE
Pt is lethargic most of the time, answers questions, abdominal pain level 4-5/10, on pca dilaudid (see MAR), pleurx drained with 2l milky drainage, tolerated well.    Problem: PAIN - ADULT  Goal: Verbalizes/displays adequate comfort level or patient's stated pain goal  Description: INTERVENTIONS:  - Encourage pt to monitor pain and request assistance  - Assess pain using appropriate pain scale  - Administer analgesics based on type and severity of pain and evaluate response  - Implement non-pharmacological measures as appropriate and evaluate response  - Consider cultural and social influences on pain and pain management  - Manage/alleviate anxiety  - Utilize distraction and/or relaxation techniques  - Monitor for opioid side effects  - Notify MD/LIP if interventions unsuccessful or patient reports new pain  - Anticipate increased pain with activity and pre-medicate as appropriate  9/5/2022 1747 by Dameon Kruse RN  Outcome: Adequate for Discharge  9/5/2022 1747 by Dameon Kruse RN  Outcome: Progressing     Problem: GASTROINTESTINAL - ADULT  Goal: Minimal or absence of nausea and vomiting  Description: INTERVENTIONS:  - Maintain adequate hydration with IV or PO as ordered and tolerated  - Nasogastric tube to low intermittent suction as ordered  - Evaluate effectiveness of ordered antiemetic medications  - Provide nonpharmacologic comfort measures as appropriate  - Advance diet as tolerated, if ordered  - Obtain nutritional consult as needed  - Evaluate fluid balance  9/5/2022 1747 by Dameon Kruse RN  Outcome: Adequate for Discharge  9/5/2022 1747 by Dameon Kruse RN  Outcome: Progressing  Goal: Maintains or returns to baseline bowel function  Description: INTERVENTIONS:  - Assess bowel function  - Maintain adequate hydration with IV or PO as ordered and tolerated  - Evaluate effectiveness of GI medications  - Encourage mobilization and activity  - Obtain nutritional consult as needed  - Establish a toileting routine/schedule  - Consider collaborating with pharmacy to review patient's medication profile  9/5/2022 1747 by Nicol Coleman RN  Outcome: Adequate for Discharge  9/5/2022 1747 by Nicol Coleman RN  Outcome: Progressing     Problem: GASTROINTESTINAL - ADULT  Goal: Maintains or returns to baseline bowel function  Description: INTERVENTIONS:  - Assess bowel function  - Maintain adequate hydration with IV or PO as ordered and tolerated  - Evaluate effectiveness of GI medications  - Encourage mobilization and activity  - Obtain nutritional consult as needed  - Establish a toileting routine/schedule  - Consider collaborating with pharmacy to review patient's medication profile  9/5/2022 1747 by Nicol Coleman RN  Outcome: Adequate for Discharge  9/5/2022 1747 by Nicol Coleman RN  Outcome: Progressing

## 2022-09-05 NOTE — PLAN OF CARE
Problem: PAIN - ADULT  Goal: Verbalizes/displays adequate comfort level or patient's stated pain goal  Description: INTERVENTIONS:  - Encourage pt to monitor pain and request assistance  - Assess pain using appropriate pain scale  - Administer analgesics based on type and severity of pain and evaluate response  - Implement non-pharmacological measures as appropriate and evaluate response  - Consider cultural and social influences on pain and pain management  - Manage/alleviate anxiety  - Utilize distraction and/or relaxation techniques  - Monitor for opioid side effects  - Notify MD/LIP if interventions unsuccessful or patient reports new pain  - Anticipate increased pain with activity and pre-medicate as appropriate  Outcome: Progressing     Problem: GASTROINTESTINAL - ADULT  Goal: Maintains or returns to baseline bowel function  Description: INTERVENTIONS:  - Assess bowel function  - Maintain adequate hydration with IV or PO as ordered and tolerated  - Evaluate effectiveness of GI medications  - Encourage mobilization and activity  - Obtain nutritional consult as needed  - Establish a toileting routine/schedule  - Consider collaborating with pharmacy to review patient's medication profile  Outcome: Not Progressing     Still no bowel movement,  took senna tonight, offered MOM,  want to wait till  tomorrow. Pain and nausea are controlled with  current  regimen, On PCA dialudid and scheduled  zofran, hospice care. Gets drowsy but arousable, spouse at the bedside. POC cont.

## 2022-09-06 NOTE — HOSPICE RN NOTE
[facility] inpatient nursing rounds. Spouse and daughters remain at bedside. Patient slow to respond. Dilaudid gtt has been titrated up to 0.6 mg/hr for management of her intractable pain. Requires IVP decadron and ondansetron for her intractable N/V. No objective signs of distress noted at this time. Hypotensive today. Last VS BP 86/58, HR 96, RR 16, Temp 97. 4. patient remains appropriate for inpatient level of hospice care. Needs continued frequent nursing assessments for administration and titration of IV comfort medications. [facility] will continue to follow this patient closely for end of life symptom management and to support family. Please call [facility] with any questions or concerns.     Canelo Gonzalez RN, [address] Liaison  [phone] after hours

## 2022-09-06 NOTE — PLAN OF CARE
Pt received drowsy, forgetful at times. Increasingly lethargic throughout the day. Afebrile. VSS. Pt states she is \"feeling okay\" and pain is controlled. Dilaudid infusing per MAR. [facility] Family at the bedside. Offered to drain pleurx, but declining for today. Repositioning for comfort. Fall precautions in place. Call light in reach. Late entry/addendum: Dilaudid gtt running @ 0.6mg/hr. MAR handoff in AM noted 0.6ml/hr. Incorrect unit documented. Amount given was 7.25mg, not ml.

## 2022-09-06 NOTE — PLAN OF CARE
Pt inpatient hospice, A&Ox4. Lethargic and forgetful at times. Room air. Pleurex cath noted on R side, drained during the day. VS qshift. Voids, incontinent x2. Briefed and bedrest. Dilaudid gtt continuously at 0.6mg/hr with 0.2mg pushes every 15 minutes as needed by pt. Pt appears comfortable, q2h turns. IV decadron and zofran given. R port noted, draws back blood. Pt's family at bedside overnight. Pt and pt's family updated on poc. No further needs at this time. Safety/fall precautions in place. WCTM. Problem: PAIN - ADULT  Goal: Verbalizes/displays adequate comfort level or patient's stated pain goal  Description: INTERVENTIONS:  - Encourage pt to monitor pain and request assistance  - Assess pain using appropriate pain scale  - Administer analgesics based on type and severity of pain and evaluate response  - Implement non-pharmacological measures as appropriate and evaluate response  - Consider cultural and social influences on pain and pain management  - Manage/alleviate anxiety  - Utilize distraction and/or relaxation techniques  - Monitor for opioid side effects  - Notify MD/LIP if interventions unsuccessful or patient reports new pain  - Anticipate increased pain with activity and pre-medicate as appropriate  Outcome: Progressing     Problem: NEUROLOGICAL - ADULT  Goal: Achieves stable or improved neurological status  Description: INTERVENTIONS  - Assess for and report changes in neurological status  - Initiate measures to prevent increased intracranial pressure  - Maintain blood pressure and fluid volume within ordered parameters to optimize cerebral perfusion and minimize risk of hemorrhage  - Monitor temperature, glucose, and sodium.  Initiate appropriate interventions as ordered  Outcome: Progressing     Problem: GASTROINTESTINAL - ADULT  Goal: Minimal or absence of nausea and vomiting  Description: INTERVENTIONS:  - Maintain adequate hydration with IV or PO as ordered and tolerated  - Nasogastric tube to low intermittent suction as ordered  - Evaluate effectiveness of ordered antiemetic medications  - Provide nonpharmacologic comfort measures as appropriate  - Advance diet as tolerated, if ordered  - Obtain nutritional consult as needed  - Evaluate fluid balance  Outcome: Progressing  Goal: Maintains or returns to baseline bowel function  Description: INTERVENTIONS:  - Assess bowel function  - Maintain adequate hydration with IV or PO as ordered and tolerated  - Evaluate effectiveness of GI medications  - Encourage mobilization and activity  - Obtain nutritional consult as needed  - Establish a toileting routine/schedule  - Consider collaborating with pharmacy to review patient's medication profile  Outcome: Progressing

## 2022-09-06 NOTE — HOSPICE RN NOTE
[facility] inpatient nursing visit. Spouse and daughters at bedside. Patient with increased weakness, confusion, and pain. Hydromorphone gtt has been titrated up to 0.a6 mg/hr for management of uncontrolled pain, breathing, anxiety and end of life symptoms. Patient remains appropriate for inpatient level of hospice care. Needs continued frequent nursing assessments for administration and titration of IV comfort medications. [facility] will continue to follow this patient closely for end of life symptom management and to support family. Please call [facility] with any questions or concerns. Discussed patient status with hospital nurse Mary Rivera.      Tony Wilson RN, [facility] [facility] Liaison  [phone] after hours

## 2022-09-07 NOTE — PLAN OF CARE
Patient drowsy and forgetful throughout shift. VSS with blood pressure trending hypotensive. Patient pain controlled, stating pain 3-6/10. Daughters and  at bedside at the beginning of shift,  to stay at bedside throughout night. Patient declined PO medications but agreed to IV Zofran and Decadron to control nausea. Safety precautions in place. Will continue to monitor.      Problem: PAIN - ADULT  Goal: Verbalizes/displays adequate comfort level or patient's stated pain goal  Description: INTERVENTIONS:  - Encourage pt to monitor pain and request assistance  - Assess pain using appropriate pain scale  - Administer analgesics based on type and severity of pain and evaluate response  - Implement non-pharmacological measures as appropriate and evaluate response  - Consider cultural and social influences on pain and pain management  - Manage/alleviate anxiety  - Utilize distraction and/or relaxation techniques  - Monitor for opioid side effects  - Notify MD/LIP if interventions unsuccessful or patient reports new pain  - Anticipate increased pain with activity and pre-medicate as appropriate  Outcome: Progressing     Problem: GASTROINTESTINAL - ADULT  Goal: Minimal or absence of nausea and vomiting  Description: INTERVENTIONS:  - Maintain adequate hydration with IV or PO as ordered and tolerated  - Nasogastric tube to low intermittent suction as ordered  - Evaluate effectiveness of ordered antiemetic medications  - Provide nonpharmacologic comfort measures as appropriate  - Advance diet as tolerated, if ordered  - Obtain nutritional consult as needed  - Evaluate fluid balance  Outcome: Progressing

## 2022-09-07 NOTE — HOSPICE RN NOTE
Inpatient hospice nursing rounds. Pt daughters are at the bedside. All questions and concerns addressed. Pt is in bed, alert and oriented x2-3, she does appear tired. Pt states her pain is under control and she rates it at a 3-4, PCA dose administered several times since 6am. Patient continues on hydromorphone drip at 0.6mg/hour, plan to increase to 0.8 mg/hr to help manage pain. Pt states her N/V is under control as well. Pt did move her bowels this morning. May need to address possible discharge home with CADD pump again. At this time, pt remains inpatient appropriate for frequent monitoring and titration of IV medications. POC discussed with Wiliam Maddox RN. Please call [facility] for any questions or concerns.

## 2022-09-07 NOTE — PLAN OF CARE
Pt received A&Ox3, drowsy/forgetful at times. Afebrile. VSS. Rating pain 3-4/10, [facility] Dilaudid gtt increased to 0.8mg/hr per orders. Offered to drain pleurx, declining currently. Pt had a BM today. Family at the bedside. Fall precautions in place. Call light in reach.

## 2022-09-08 NOTE — CM/SW NOTE
Met with pt and family earlier in the day. Pt rating her pain a 7 . Pt would go in and out of sleep.  Confused, provided support and answered questions ,

## 2022-09-08 NOTE — PLAN OF CARE
Received pt alert and orientated x3-4. Pt is drowsy and forgetful at times. VSS. No sob on RA. Afebrile. Rating pain a 4-5/10. RH saw pt today. Pt up to the commode to use bathroom. Had a BM. Eating minimal. Plurex drained for comfort per patient request; 1L removed. Repositioned for comfort. Family at the bedside. Fall precautions in place. Call light within reach.        Problem: PAIN - ADULT  Goal: Verbalizes/displays adequate comfort level or patient's stated pain goal  Description: INTERVENTIONS:  - Encourage pt to monitor pain and request assistance  - Assess pain using appropriate pain scale  - Administer analgesics based on type and severity of pain and evaluate response  - Implement non-pharmacological measures as appropriate and evaluate response  - Consider cultural and social influences on pain and pain management  - Manage/alleviate anxiety  - Utilize distraction and/or relaxation techniques  - Monitor for opioid side effects  - Notify MD/LIP if interventions unsuccessful or patient reports new pain  - Anticipate increased pain with activity and pre-medicate as appropriate  Outcome: Progressing

## 2022-09-08 NOTE — PROGRESS NOTES
Patient A&Ox2-3. Con confusion noted overnight. dilauded pca drip. Family at bedside. All meds given per [facility]. Call light within place. Will continue to monitor.

## 2022-09-08 NOTE — HOSPICE RN NOTE
[facility] inpatient nursing visit. Spouse and daughters at bedside. Questions and concerns addressed. End of life education provided. Patient responsive to simple questions. Appear weaker, slow to respond. Per family they have noticed breathing changes at times, short periods of apnea. Patient developing secretions. Dilaudid gtt has been titrated up to 0.8 mg/hr for management of her severe pain. Intermittently pushes PCA button for pain relief. Hospital nurse at bedside administering IVP ondansetron for her intractable nausea. Discussed patient with hospital nurse Mary Jane Grissom. Patient remains appropriate for inpatient level of hospice care. Needs continued frequent nursing assessments for administration and titration of IV comfort medications. Will consider increasing PCA dose today, will monitor. [facility] will continue to follow this patient closely for end of life symptom management and to support family. Please call [facility] with any questions or concerns.     Bijal German RN, [address] Liaison  [phone] after hours

## 2022-09-08 NOTE — HOSPICE RN NOTE
Inpatient hospice nursing rounds. Pt is in bed, asleep, pt  is at the bedside as well as her daughters. Pt does appear comfortable, dilaudid drip was increased today to 0.8mg/hour to help manage pain. Educated pt  on EOL s/s, no questions or concerns at this time. Pt remains inpatient appropriate for symptom management with IV medications needing titrated.

## 2022-09-09 NOTE — PLAN OF CARE
Pt used bedside commode in the morning and ouput approximately 200 mL. For the remainder of the day, pt appeared withdrawn and lethargic with minimal conversation and appeared confused at times. Pt had a couple of bites of banana and drinks of Dr. Ruthann Infante and water. Pt breathing irregular at times with 15-30 seconds pauses. Pt continued to have spouse and family at the bedside. Problem: PAIN - ADULT  Goal: Verbalizes/displays adequate comfort level or patient's stated pain goal  Description: INTERVENTIONS:  - Encourage pt to monitor pain and request assistance  - Assess pain using appropriate pain scale  - Administer analgesics based on type and severity of pain and evaluate response  - Implement non-pharmacological measures as appropriate and evaluate response  - Consider cultural and social influences on pain and pain management  - Manage/alleviate anxiety  - Utilize distraction and/or relaxation techniques  - Monitor for opioid side effects  - Notify MD/LIP if interventions unsuccessful or patient reports new pain  - Anticipate increased pain with activity and pre-medicate as appropriate  Outcome: Not Progressing     Problem: NEUROLOGICAL - ADULT  Goal: Achieves stable or improved neurological status  Description: INTERVENTIONS  - Assess for and report changes in neurological status  - Initiate measures to prevent increased intracranial pressure  - Maintain blood pressure and fluid volume within ordered parameters to optimize cerebral perfusion and minimize risk of hemorrhage  - Monitor temperature, glucose, and sodium.  Initiate appropriate interventions as ordered  Outcome: Not Progressing     Problem: GASTROINTESTINAL - ADULT  Goal: Minimal or absence of nausea and vomiting  Description: INTERVENTIONS:  - Maintain adequate hydration with IV or PO as ordered and tolerated  - Nasogastric tube to low intermittent suction as ordered  - Evaluate effectiveness of ordered antiemetic medications  - Provide nonpharmacologic comfort measures as appropriate  - Advance diet as tolerated, if ordered  - Obtain nutritional consult as needed  - Evaluate fluid balance  Outcome: Not Progressing  Goal: Maintains or returns to baseline bowel function  Description: INTERVENTIONS:  - Assess bowel function  - Maintain adequate hydration with IV or PO as ordered and tolerated  - Evaluate effectiveness of GI medications  - Encourage mobilization and activity  - Obtain nutritional consult as needed  - Establish a toileting routine/schedule  - Consider collaborating with pharmacy to review patient's medication profile  Outcome: Not Progressing

## 2022-09-09 NOTE — HOSPICE RN NOTE
[facility] Rounds. [facility] RN assessment of patient at bedside. Lung sounds diminished throughout upon auscultation. Respirations irregular with 15-30s pauses, on room air. Patient alert to self, intermittently disoriented to place and time, PPS 20%. Patient level of consciousness continually declining. Patient complain of pain 4/10 self report. Patient receiving PCA doses dilaudid 0.2mg IVP PRN and dilaudid gtt 0.8mg/hr IV continuous by facility RN for pain, shortness of breath, and restlessness not controlled with oral medications. Update given to spouse Aisha Mello and family at bedside and all questions and concerns regarding patient comfort, appetite, symptoms, and comfort medications addressed and discussed in length. Update given to Rhonda RN and Sheryle Lain, RN who are in agreement with plan of care and will continue to administer, monitor, and titrate IV medications as indicated. [facility] continuing to follow up to determine patient appropriateness for inpatient hospice care.      Emmett Russo, [facility] [place]  After hours: [phone]  Office: [phone]

## 2022-09-09 NOTE — PROGRESS NOTES
Patient A&Ox3, forgetful at times. VSS. On PCA gtt. Family at bedside. Repositioned. All meds given per [facility]. Tolerated well. Will continue to monitor.

## 2022-09-10 NOTE — PLAN OF CARE
Pt received drowsy but arousable, at the bedside, dilaudid PCA infusing, Respirations at 4 breaths/ min with 15 sec pauses. Pt appears comfortable, no signs of distress noted, will monitor.

## 2022-09-10 NOTE — HOSPICE RN NOTE
Patient lying in bed sleeping. Dilaudid continuous infusion running at 0.8mg/hr.  states that patient had 2 bites of pudding this morning and has been decreasing throughout the day in responsiveness. Encouraged to continue to communicate and talk to patient.  and daughters at bedside.  asked for information about dying process. Provided copy of GONE FROM MY SIGHT. Will continue to monitor patient for comfort and dignity and provide support for family.

## 2022-09-10 NOTE — HOSPICE RN NOTE
PT LYING IN BED MINIMALLY RESPONSIVE AND MOANING WITH ANY TACTILE STIMULI. RR AT 28 WITH LABORED BREATHING ON ROOM AIR. PATIENT ALSO NOTED TO BE SHAKING IN BED AND CONTINUES TO BE RESTLESS. PATIENT HAD BM OF MODERATE AMOUNT OF BROWN LIQUID STOOL. ASSISTED THE FACILITY RN TO CHANGE AND CLEAN THE PATIENT. CALLED AND SPOKE TO SON MATT WHO IS AGREEABLE TO INCREASE THE DILAUDID CONTINUOUS INFUSION TO 0.8MG/HR AND GIVEN DOSE OF HALDOL FOR AGITATION. EMOTIONAL SUPPORT PROVIDED TO [address] ON THE PHONE WHO SOUNDED TEARFUL. ENCOURAGED SELF CARE AND ACTIVELY LISTENED. PATIENT IS HYPOTENSIVE. LUNG SOUNDS CLEAR. BOWEL SOUNDS ACTIVE. LOWER EXTREMITIES PALE WITH GOOD PULSES. PATIENT REMAINS APPROPRIATE FOR INPATIENT LEVEL OF HOSPICE CARE FOR MANAGEMENT OF PAIN, AGITATION AND TITRATION OF IV MEDICATIONS AS INDICATED. WILL CONTINUE TO MONITOR FOR COMFORT AND DIGNITY.

## 2022-09-11 NOTE — PLAN OF CARE
Pt drowsy but arousable, can be confused at times, dilaudid PCA infusing, abad patent and draining,  at bedside. No sign of distress noted, pt appears comfortable, will continue to monitor.

## 2022-09-11 NOTE — HOSPICE RN NOTE
PATIENT IS LYING IN BED AND AROUSABLE TO VOICE. A&OX3 BUT CONFUSED AT TIMES. LEODAN, SPOUSE STATES THAT PATIENT AT TIMES SAYS SHE NEEDS TO GET ON A BOAT. GARDUNO PLACED LAST NIGHT FOR COMFORT. PATIENT DENIES PAIN AT THIS TIME. DILAUDID CONTINUOUS INFUSION RUNNING AT 0.8MG/HR. BOLUS DOSE OF 0.2MG DILAUDID PCA PUSHED 4 TIMES OVER LAST 24 HOURS. PATIENT IS EATING BITES OF PUDDING AT A TIME. NO COMPLAINTS OF N&V.   AND DAUGHTERS AT BEDSIDE. SPOKE WITH THEM ON DISCHARGE PLANNING. PROVIDED INFORMATION ON HOSPICE LEVELS OF CARE AS WELL AS CAREGIVING AGENCIES AND FACILITIES IN THE AREA. FAMILY STATE THAT PATIENT DOES NOT WANT TO GO HOME SO THEY ARE LEANING TOWARD A FACILITY.  LEODAN STATES THAT THIS IS A LOT OF INFORMATION TO DIGEST AND THEY NEED TIME TO LOOK AT FACILITIES TO BRING THE PATIENT TO.  TEARFUL AT TIMES BUT OPEN TO RECEIVE THE INFORMATION. BRAIN STATES THAT THEY WOULD PREFER PATIENT TO REMAIN AT [facility] [address] BUT EXPLAINED THAT PATIENT IS NO LONGER ELIGIBLE FOR GIP LEVEL OF CARE. LEODAN VERBALIZED UNDERSTANDING. WILL CONTINUE TO MONITOR THIS PATIENT FOR COMFORT AND DIGNITY.

## 2022-09-11 NOTE — HOSPICE RN NOTE
Evening Rounds  Patient lying in bed. More somnolent tonight. Opens eyes to verbal stimuli and will answer yes or no questions but will fall asleep in a few seconds.  and daughters remain at bedside and state that patient has been trying to cough up secretions more tonight and appears to be in distress when doing so. Patient will furrow brow and looks like she is in pain. Scop patch in place. Going to try atropine drops tonight. If ineffective, suggested that family try a PRN dose of dilaudid 0.2mg to decrease cough for antitussive effects since patient is unable to swallow liquid cough medication. Comfort medications in place. Will continue to monitor for comfort and dignity.

## 2022-09-11 NOTE — CM/SW NOTE
TITA spoke with Charan Valiente from [facility] regarding discharge plan. Charan Valiente states she plans to reach out to pt's spouse to discuss pt's inpatient hospice stay. TITA to follow.

## 2022-09-11 NOTE — PLAN OF CARE
Pt is lethargic/sedated, answers questions y/n sporadically, remains on PCA dilaudid, with 1-2 secs of apnea episode,  at bedside. Problem: PAIN - ADULT  Goal: Verbalizes/displays adequate comfort level or patient's stated pain goal  Description: INTERVENTIONS:  - Encourage pt to monitor pain and request assistance  - Assess pain using appropriate pain scale  - Administer analgesics based on type and severity of pain and evaluate response  - Implement non-pharmacological measures as appropriate and evaluate response  - Consider cultural and social influences on pain and pain management  - Manage/alleviate anxiety  - Utilize distraction and/or relaxation techniques  - Monitor for opioid side effects  - Notify MD/LIP if interventions unsuccessful or patient reports new pain  - Anticipate increased pain with activity and pre-medicate as appropriate  Outcome: Not Progressing     Problem: NEUROLOGICAL - ADULT  Goal: Achieves stable or improved neurological status  Description: INTERVENTIONS  - Assess for and report changes in neurological status  - Initiate measures to prevent increased intracranial pressure  - Maintain blood pressure and fluid volume within ordered parameters to optimize cerebral perfusion and minimize risk of hemorrhage  - Monitor temperature, glucose, and sodium.  Initiate appropriate interventions as ordered  Outcome: Not Progressing     Problem: GASTROINTESTINAL - ADULT  Goal: Minimal or absence of nausea and vomiting  Description: INTERVENTIONS:  - Maintain adequate hydration with IV or PO as ordered and tolerated  - Nasogastric tube to low intermittent suction as ordered  - Evaluate effectiveness of ordered antiemetic medications  - Provide nonpharmacologic comfort measures as appropriate  - Advance diet as tolerated, if ordered  - Obtain nutritional consult as needed  - Evaluate fluid balance  Outcome: Progressing  Goal: Maintains or returns to baseline bowel function  Description: INTERVENTIONS:  - Assess bowel function  - Maintain adequate hydration with IV or PO as ordered and tolerated  - Evaluate effectiveness of GI medications  - Encourage mobilization and activity  - Obtain nutritional consult as needed  - Establish a toileting routine/schedule  - Consider collaborating with pharmacy to review patient's medication profile  Outcome: Progressing

## 2022-09-12 NOTE — HOSPICE RN NOTE
[facility] inpatient nursing visit. Joint visit with [facility] MS[facility] [facility]. Spouse not present. Both daughters at bedside. Questions and concerns addressed. Last VS BP 70/45, , RR 20, Temp 98.1 oral. Patient minimally responsive, hypotensive, tachycardic. Appears actively dying. Patient on dilaudid gtt infusing at 0.8 mg/hr for management of intractable pain and breathing. Breathing appears labored, shallow. Patient had bolus dose of IV PCA dilaudid at 0.2 mg. Patient remains appropriate of inpatient level of hospice care. Needs continued frequent nursing assessments for administration and titration of IV comfort medications. [facility] will continue to follow this patient closely for end of life symptom management and to support family. Please call [facility] with any questions or concerns.     Rickie Dyson RN, [address] Liaison  [phone] after hours

## 2022-09-12 NOTE — CM/SW NOTE
SW met with pt and 2 daughters . Discussed change in symptoms and decline. Provided support . Discussed that today she continues to meet gip criteria - needing additional prn doses for labored breathing. No other needs at this time.

## 2022-09-12 NOTE — PLAN OF CARE
Received pt minimally responsive. BP 70/45. Tachy. Pt on dilaudid PCA for pain management and breathing. Breathing appears labored and shallow. Pt having 2-3 second apneic episodes. Curry is intact draining minimal amounts of urine. All meds given per [facility]. Family at the bedside. Fall precautions in place. Call light within reach.      Problem: PAIN - ADULT  Goal: Verbalizes/displays adequate comfort level or patient's stated pain goal  Description: INTERVENTIONS:  - Encourage pt to monitor pain and request assistance  - Assess pain using appropriate pain scale  - Administer analgesics based on type and severity of pain and evaluate response  - Implement non-pharmacological measures as appropriate and evaluate response  - Consider cultural and social influences on pain and pain management  - Manage/alleviate anxiety  - Utilize distraction and/or relaxation techniques  - Monitor for opioid side effects  - Notify MD/LIP if interventions unsuccessful or patient reports new pain  - Anticipate increased pain with activity and pre-medicate as appropriate  Outcome: Progressing

## 2022-09-13 NOTE — DISCHARGE SUMMARY
[facility] Internal Medicine Discharge Summary   Patient ID:  Lambert Yuen  [id number]  62year old  1965    Admit date: 2022    Discharge date and time: 2022     Attending Physician: Regino Rosenberg MD    Primary Care Physician: Arnold Gannon MD     Admit Dx: Colon cancer  Colon cancer metastasized to multiple sites [facility])    Hospital Discharge Diagnoses:     Hospital Course: Pt  on inpt hospice    Day of discharge Exam    22   BP: (!) 75/47   Pulse: (!) 130   Resp:    Temp:        Exam on day of discharge:    nopt compatible c life     Sent msg to pcp and called/offered condolences to     MD Dakota Polanco  [phone]  2022  11:07 AM  '

## 2022-09-13 NOTE — PROGRESS NOTES
09/13/22 0100   Clinical Encounter Type   Visited With Patient and family together   Continue Visiting No   Crisis Visit Patient actively dying;Death   Patient Spiritual Encounters   Spiritual Assessment Completed Yes   Family Spiritual Encounters   Family Coping Accepting; Sadness   Family Participation in Care Consistently   Family Support During Treatment Consistently   Taxonomy   Intended Effects Helping someone feel comforted   Methods Demonstrate acceptance; Explore nature of God;Explore wang and values   Interventions Acknowledge current situation; Ask guided questions about cultural and Sabianist values;Provide grief resources;Silent prayer; Share words of hope and inspiration;Perform a Sabianist rite or ritual    respected the family dynamics (quiet and reverential)  After prayers,  facilitated a short discussion on [place]. He then encouraged sharing of stories (e.g.  shared briefly their life together and almost 44 years of marriage).  and two daughters thanked  for visit and support.

## 2022-09-13 NOTE — HOSPICE RN NOTE
[facility] Rounds. [facility] RN assessment of patient at bedside. Lung sounds diminished throughout. Patient respirations 5, very irregular with pauses 15-30 seconds, on room air. Patient unresponsive to verbal and tactile stimuli, PPS 10%. Deteriorating vitals signs. Rapid change in level of consciousness. Patient receiving dilaudid gtt 0.8mg/hr with frequent doses dilaudid 0.2mg IVP PRN via PCA managed by facility RN for pain, shortness of breath, and restlessness. Update given to patient's spouse at bedside and all questions and concerns regarding end-of-life signs and symptoms addressed and discussed in length. Update given to [address] who is in agreement with plan of care and will continue to administer, monitor, and titrate IV medications as indicated. [facility] continuing to follow up to determine patient appropriateness for inpatient hospice care.     Angel Oro, [facility]  After hours: [phone]  Office: [phone]

## 2022-09-13 NOTE — PLAN OF CARE
Pt passed this morning at [address] with spouse at bedside and confirmed with Charge RN.  notified and at bedside per family request. Ronnie Arroyo called and pt a candidate for eye donation, family aware. MD and RN supervisor aware of pt's passing. [facility] notified. [place] [facility] [place].

## 2024-04-03 NOTE — PLAN OF CARE
Pt is aox4 on room air no tele and pt has denied any sob or chest pain at this time. Pt has been afebrile during this shift, and pt vitals has been stable.  Pt is resting in bed Pt has been free of N/V during this shift an pt has been free of bleeding durin hemorrhage  - Monitor labs and vital signs for trends  - Administer supportive blood products/factors, fluids and medications as ordered and appropriate  - Administer supportive blood products/factors as ordered and appropriate  Outcome: Progressing  Goal: Daughter

## (undated) DEVICE — INTENDED FOR TISSUE SEPARATION, AND OTHER PROCEDURES THAT REQUIRE A SHARP SURGICAL BLADE TO PUNCTURE OR CUT.: Brand: BARD-PARKER ® STAINLESS STEEL BLADES

## (undated) DEVICE — DISPOSABLE ORTHOPAEDIC PROTECTOR: Brand: ALEXIS ® ORTHOPAEDIC PROTECTOR

## (undated) DEVICE — SIGMOID SURGICAL SUCTION INSTRUMENT: Brand: ARGYLE

## (undated) DEVICE — DRAPE,UNDRBUT,WHT GRAD PCH,CAPPORT,20/CS: Brand: MEDLINE

## (undated) DEVICE — GAMMEX® PI HYBRID SIZE 7.5, STERILE POWDER-FREE SURGICAL GLOVE, POLYISOPRENE AND NEOPRENE BLEND: Brand: GAMMEX

## (undated) DEVICE — A P RESECTION: Brand: MEDLINE INDUSTRIES, INC.

## (undated) DEVICE — LIGASURE LAP MARYLAND 37CM

## (undated) DEVICE — Device

## (undated) DEVICE — DRAPE SHEET LAPCHOLE 124X100X7

## (undated) DEVICE — SOLUTION ENDOSCOPIC ANTI-FOG NON-TOXIC NON-ABRASIVE 6 CUBIC CENTIMETER WITH RADIOPAQUE ADHESIVE-BACKED SPONGE STERILE NOT MADE WITH NATURAL RUBBER LATEX MEDICHOICE: Brand: MEDICHOICE

## (undated) DEVICE — Device: Brand: DISPOSABLE BULB & BLADDER

## (undated) DEVICE — SOL  .9 1000ML BTL

## (undated) DEVICE — [HIGH FLOW INSUFFLATOR,  DO NOT USE IF PACKAGE IS DAMAGED,  KEEP DRY,  KEEP AWAY FROM SUNLIGHT,  PROTECT FROM HEAT AND RADIOACTIVE SOURCES.]: Brand: PNEUMOSURE

## (undated) DEVICE — TROCAR: Brand: KII® SLEEVE

## (undated) DEVICE — DISPOSABLE GRASPER CARTRIDGE: Brand: DIRECT DRIVE REPOSABLE GRASPERS

## (undated) DEVICE — STAPLER CIRCULAR ENDO 29MM

## (undated) DEVICE — DISPOSABLE GRASPER: Brand: EPIX LAPAROSCOPIC GRASPER

## (undated) DEVICE — THE ECHELON FLEX POWERED PLUS ARTICULATING ENDOSCOPIC LINEAR CUTTERS ARE STERILE, SINGLE PATIENT USE INSTRUMENTS THAT SIMULTANEOUSLYCUT AND STAPLE TISSUE. THERE ARE SIX STAGGERED ROWS OF STAPLES, THREE ON EITHER SIDE OF THE CUT LINE. THE ECHELON FLEX 45 POWERED PLUSINSTRUMENTS HAVE A STAPLE LINE THAT IS APPROXIMATELY 45 MM LONG AND A CUT LINE THAT IS APPROXIMATELY 42 MM LONG. THE SHAFT CAN ROTATE FREELYIN BOTH DIRECTIONS AND AN ARTICULATION MECHANISM ENABLES THE DISTAL PORTION OF THE SHAFT TO PIVOT TO FACILITATE LATERAL ACCESS TO THE OPERATIVESITE.THE INSTRUMENTS ARE PACKAGED WITH A PRIMARY LITHIUM BATTERY PACK THAT MUST BE INSTALLED PRIOR TO USE. THERE ARE SPECIFIC REQUIREMENTS FORDISPOSING OF THE BATTERY PACK. REFER TO THE BATTERY PACK DISPOSAL SECTION.THE INSTRUMENTS ARE PACKAGED WITHOUT A RELOAD AND MUST BE LOADED PRIOR TO USE. A STAPLE RETAINING CAP ON THE RELOAD PROTECTS THE STAPLE LEGPOINTS DURING SHIPPING AND TRANSPORTATION. THE INSTRUMENTS’ LOCK-OUT FEATURE IS DESIGNED TO PREVENT A USED OR IMPROPERLY INSTALLED RELOADFROM BEING REFIRED OR AN INSTRUMENT FROM BEING FIRED WITHOUT A RELOAD.: Brand: ECHELON FLEX

## (undated) DEVICE — VIOLET BRAIDED (POLYGLACTIN 910), SYNTHETIC ABSORBABLE SUTURE: Brand: COATED VICRYL

## (undated) DEVICE — TROCAR: Brand: KII FIOS FIRST ENTRY

## (undated) DEVICE — VISUALIZATION SYSTEM: Brand: CLEARIFY

## (undated) DEVICE — 40580 - THE PINK PAD - ADVANCED TRENDELENBURG POSITIONING KIT: Brand: 40580 - THE PINK PAD - ADVANCED TRENDELENBURG POSITIONING KIT

## (undated) DEVICE — FLEXIBLE YANKAUER,MEDIUM TIP, NO VACUUM CONTROL: Brand: ARGYLE

## (undated) DEVICE — LEGGINGS SRG 48X31IN CUF STRL

## (undated) DEVICE — 1200CC GUARDIAN II: Brand: GUARDIAN

## (undated) DEVICE — 3M™ RED DOT™ MONITORING ELECTRODE WITH FOAM TAPE AND STICKY GEL, 50/BAG, 20/CASE, 72/PLT 2570: Brand: RED DOT™

## (undated) DEVICE — FORCEP BIOPSY RJ4 LG CAP W/ND

## (undated) DEVICE — LUBRICANT JLY SURGILUBE 2OZ

## (undated) DEVICE — ENDOSCOPY PACK UPPER: Brand: MEDLINE INDUSTRIES, INC.

## (undated) DEVICE — FILTERLINE NASAL ADULT O2/CO2

## (undated) DEVICE — TROCARS: Brand: KII® BALLOON BLUNT TIP SYSTEM

## (undated) DEVICE — DISPOSABLE SUCTION/IRRIGATOR TUBE SET: Brand: AHTO

## (undated) DEVICE — THE ECHELON, ECHELON ENDOPATH™ AND ECHELON FLEX™ FAMILIES OF ENDOSCOPIC LINEAR CUTTERS AND RELOADS ARE STERILE, SINGLE PATIENT USE INSTRUMENTS THAT SIMULTANEOUSLY CUT AND STAPLE TISSUE. THERE ARE SIX STAGGERED ROWS OF STAPLES, THREE ON EITHER SIDE OF THE CUT LINE. THE 45 MM INSTRUMENTS HAVE A STAPLE LINE THATIS APPROXIMATELY 45 MM LONG AND A CUT LINE THAT IS APPROXIMATELY 42 MM LONG. THE SHAFT CAN ROTATE FREELY IN BOTH DIRECTIONS AND AN ARTICULATION MECHANISM ON ARTICULATING INSTRUMENTS ENABLES BENDING THE DISTAL PORTIONOF THE SHAFT TO FACILITATE LATERAL ACCESS OF THE OPERATIVE SITE.THE INSTRUMENTS ARE SHIPPED WITHOUT A RELOAD AND MUST BE LOADED PRIOR TO USE. A STAPLE RETAINING CAP ON THE RELOAD PROTECTS THE STAPLE LEG POINTS DURING SHIPPING AND TRANSPORTATION. THE INSTRUMENTS’ LOCK-OUT FEATURE IS DESIGNED TO PREVENT A USED RELOAD FROM BEING REFIRED.: Brand: ECHELON ENDOPATH

## (undated) DEVICE — PLUMEPORT ACTIV LAPAROSCOPIC SMOKE FILTRATION DEVICE: Brand: PLUMEPORT ACTIVE

## (undated) DEVICE — SUTURE PDS II 1 CT-1

## (undated) DEVICE — Device: Brand: DEFENDO AIR/WATER/SUCTION AND BIOPSY VALVE

## (undated) DEVICE — SOL  .9 3000ML

## (undated) NOTE — LETTER
[facility] [address], [address]  Consent for Procedure/Sedation    Date: 10/25/20    Time:       1. I authorize the performance upon Margi Turk the following:  Inferior Vena Cava Filter Insertion     2.  I authorize Dr. Steve Tolbert Printed: 10/25/2020   3:56 PM  Patient Name: María Castañeda        : 1965       Medical Record #: [id number]

## (undated) NOTE — ED AVS SNAPSHOT
Robles Palmira   MRN: [id number]    Department:  [facility] Emergency Department   Date of Visit:  3/3/2020           Disclosure     Insurance plans vary and the physician(s) referred by the ER may not be covered by your plan.  Please contact your in tell this physician (or your personal doctor if your instructions are to return to your personal doctor) about any new or lasting problems. The primary care or specialist physician will see patients referred from the [facility] Emergency Department.  Franklin Mccoy

## (undated) NOTE — LETTER
[address] [facility] FOR BLOOD OR BLOOD COMPONENTS      In the course of your treatment, it may become necessary to administer a transfusion of blood or blood components.  This form provides basic information concerning this proc If loss of blood poses serious threats in the course of your treatment, THERE IS NO EFFECTIVE ALTERNATIVE TO BLOOD TRANSFUSION.  However, if you have any further questions on this matter, your physician will fully explain the alternatives to you if it has n

## (undated) NOTE — LETTER
[facility] [address], [address] [place] [address]  Consent for Procedure/Sedation    Date: 07/29/2022    Time: 08:20      1. I hereby authorize Dr Sasha Jiménez, my physician and his/her assistants (if applicable), which may include medical students, residents, and/or fellows, to perform the following surgical operation/ procedure and administer such anesthesia as may be determined necessary by my physician:  Abdominal Pleurex Catheter Insertion on [address] [facility]  2. I recognize that during the surgical operation/procedure, unforeseen conditions may necessitate additional or different procedures than those listed above. I, therefore, further authorize and request that the above-named surgeon, assistants, or designees perform such procedures as are, in their judgment, necessary and desirable. 3.   My surgeon/physician has discussed prior to my surgery the potential benefits, risks and side effects of this procedure; the likelihood of achieving goals; and potential problems that might occur during recuperation. They also discussed reasonable alternatives to the procedure, including risks, benefits, and side effects related to the alternatives and risks related to not receiving this procedure. I have had all my questions answered and I acknowledge that no guarantee has been made as to the result that may be obtained. 4.   Should the need arise during my operation or immediate post-operative period, I also consent to the administration of blood and/or blood products. Further, I understand that despite careful testing and screening of blood or blood products by collecting agencies, I may still be subject to ill effects as a result of receiving a blood transfusion and/or blood products. The following are some, but not all, of the potential risks that can occur: fever and allergic reactions, hemolytic reactions, transmission of diseases such as Hepatitis, AIDS and Cytomegalovirus (CMV) and fluid overload.   In the event that I wish to have an autologous transfusion of my own blood, or a directed donor transfusion. I will discuss this with my physician. 5.   I authorize the use of any specimen, organs, tissues, body parts or foreign objects that may be removed from my body during the operation/procedure for diagnosis, research or teaching purposes and their subsequent disposal by hospital authorities. I also authorize the release of specimen test results and/or written reports to my treating physician on the hospital medical staff or other referring or consulting physicians involved in my care, at the discretion of the Pathologist or my treating physician. 6.   I consent to the photographing or videotaping of the operations or procedures to be performed, including appropriate portions of my body for medical, scientific, or educational purposes, provided my identity is not revealed by the pictures or by descriptive texts accompanying them. If the procedure has been photographed/videotaped, the surgeon will obtain the original picture, image, videotape or CD. The hospital will not be responsible for storage, release or maintenance of the picture, image, tape or CD.    7.   I consent to the presence of a  or observers in the operating room as deemed necessary by my physician or their designees. 8.   I recognize that in the event my procedure results in extended X-Ray/fluoroscopy time, I may develop a skin reaction. 9. If I have a Do Not Attempt Resuscitation (DNAR) order in place, that status will be suspended while in the operating room, procedural suite, and during the recovery period unless otherwise explicitly stated by me (or a person authorized to consent on my behalf). The surgeon or my attending physician will determine when the applicable recovery period ends for purposes of reinstating the DNAR order.   10. Patients having a sterilization procedure: I understand that if the procedure is successful the results will be permanent and it will therefore be impossible for me to inseminate, conceive, or bear children. I also understand that the procedure is intended to result in sterility, although the result has not been guaranteed. 11. I acknowledge that my physician has explained sedation/analgesia administration to me including the risk and benefits I consent to the administration of sedation/analgesia as may be necessary or desirable in the judgment of my physician.     I CERTIFY THAT I HAVE READ AND FULLY UNDERSTAND THE ABOVE CONSENT TO OPERATION and/or OTHER PROCEDURE.      _________________________________________  __________________________________  Signature of Patient     Signature of Responsible Person         ___________________________________         Printed Name of Responsible Person           _________________________________                 Relationship to Patient  _________________________________________  ______________________________  Signature of Witness          Date  Time      Patient Name: Leila Sheppard     : 1965                 Printed: 2022     Medical Record #: [id number]                     Page 1 of